# Patient Record
Sex: FEMALE | Race: ASIAN | NOT HISPANIC OR LATINO | Employment: OTHER | URBAN - METROPOLITAN AREA
[De-identification: names, ages, dates, MRNs, and addresses within clinical notes are randomized per-mention and may not be internally consistent; named-entity substitution may affect disease eponyms.]

---

## 2017-02-06 RX ORDER — HYDROCHLOROTHIAZIDE 25 MG/1
TABLET ORAL
Qty: 30 TABLET | Refills: 2 | OUTPATIENT
Start: 2017-02-06

## 2017-02-16 NOTE — TELEPHONE ENCOUNTER
----- Message from Betzy Costa sent at 2/16/2017  4:41 PM CST -----  Contact: Self [phone]  Patient is calling to get refills on her medication sent to [facility]/pharmacy #[id number] - [place], [place] - [address] AT [facility] [phone] (Phone)  [phone] (Fax)    1. hydrochlorothiazide (HYDRODIURIL) 25 MG tablet 30 tablet

## 2017-02-17 RX ORDER — HYDROCHLOROTHIAZIDE 25 MG/1
25 TABLET ORAL DAILY
Qty: 30 TABLET | Refills: 11 | Status: SHIPPED | OUTPATIENT
Start: 2017-02-17 | End: 2018-03-06 | Stop reason: SDUPTHER

## 2017-02-23 ENCOUNTER — TELEPHONE (OUTPATIENT)
Dept: FAMILY MEDICINE | Facility: CLINIC | Age: 72
End: 2017-02-23

## 2017-03-06 RX ORDER — PRAVASTATIN SODIUM 10 MG/1
TABLET ORAL
Qty: 90 TABLET | Refills: 3 | Status: SHIPPED | OUTPATIENT
Start: 2017-03-06 | End: 2018-04-15 | Stop reason: SDUPTHER

## 2017-03-14 ENCOUNTER — TELEPHONE (OUTPATIENT)
Dept: FAMILY MEDICINE | Facility: CLINIC | Age: 72
End: 2017-03-14

## 2017-03-14 NOTE — TELEPHONE ENCOUNTER
----- Message from Betzy Costa sent at 3/14/2017 10:37 AM CDT -----  Contact: Self [phone]  Patient is calling to see if she can get medication called in to the pharmacy she has a cold and a severe cough. Please advice

## 2017-03-14 NOTE — TELEPHONE ENCOUNTER
Call returned to pt.  Pt was offered an appt today for cough.  Pt stated that she could not be seen today but will call tomorrow morning for an [facility] appt.

## 2017-03-27 ENCOUNTER — TELEPHONE (OUTPATIENT)
Dept: FAMILY MEDICINE | Facility: CLINIC | Age: 72
End: 2017-03-27

## 2017-03-27 NOTE — TELEPHONE ENCOUNTER
----- Message from Susan Real sent at 3/27/2017  9:18 AM CDT -----  Contact: [phone]/self   Pt its requesting an appointment tomorrow morning, states she is having a problem . Please advise

## 2017-03-28 ENCOUNTER — OFFICE VISIT (OUTPATIENT)
Dept: FAMILY MEDICINE | Facility: CLINIC | Age: 72
End: 2017-03-28
Payer: MEDICARE

## 2017-03-28 VITALS
DIASTOLIC BLOOD PRESSURE: 70 MMHG | HEIGHT: 63 IN | RESPIRATION RATE: 16 BRPM | BODY MASS INDEX: 28.88 KG/M2 | TEMPERATURE: 99 F | SYSTOLIC BLOOD PRESSURE: 126 MMHG | WEIGHT: 163 LBS | HEART RATE: 80 BPM | OXYGEN SATURATION: 97 %

## 2017-03-28 DIAGNOSIS — J06.9 UPPER RESPIRATORY TRACT INFECTION, UNSPECIFIED TYPE: Primary | ICD-10-CM

## 2017-03-28 PROCEDURE — 1159F MED LIST DOCD IN RCRD: CPT | Mod: S$GLB,,, | Performed by: FAMILY MEDICINE

## 2017-03-28 PROCEDURE — 3078F DIAST BP <80 MM HG: CPT | Mod: S$GLB,,, | Performed by: FAMILY MEDICINE

## 2017-03-28 PROCEDURE — 1160F RVW MEDS BY RX/DR IN RCRD: CPT | Mod: S$GLB,,, | Performed by: FAMILY MEDICINE

## 2017-03-28 PROCEDURE — 3074F SYST BP LT 130 MM HG: CPT | Mod: S$GLB,,, | Performed by: FAMILY MEDICINE

## 2017-03-28 PROCEDURE — 1125F AMNT PAIN NOTED PAIN PRSNT: CPT | Mod: S$GLB,,, | Performed by: FAMILY MEDICINE

## 2017-03-28 PROCEDURE — 99213 OFFICE O/P EST LOW 20 MIN: CPT | Mod: S$GLB,,, | Performed by: FAMILY MEDICINE

## 2017-03-28 PROCEDURE — 1157F ADVNC CARE PLAN IN RCRD: CPT | Mod: S$GLB,,, | Performed by: FAMILY MEDICINE

## 2017-03-28 PROCEDURE — 99999 PR PBB SHADOW E&M-EST. PATIENT-LVL III: CPT | Mod: PBBFAC,,, | Performed by: FAMILY MEDICINE

## 2017-03-28 NOTE — PROGRESS NOTES
(Portions of this note were dictated using voice recognition software and may contain dictation related errors in spelling/grammar/syntax not found on text review)    CC:   Chief Complaint   Patient presents with    Cough    Otalgia       HPI: 71 y.o. female started with one-week history of upper history symptoms including sore throat, nasal congestion, cough, headache.  Takes Claritin and some Vicks over-the-counter medication and the symptoms did eventually get better after 7 days.  For about 3 or 4 days she was feeling well with normal respiratory symptoms.  Then after that time she started back with sore throat, nasal congestion, dry cough, headache, fatigue.  These current symptoms of been going on for about 4 days now.  Denies any fevers or chills.  No nausea or vomiting.  No chest pain or shortness of breath.  No body aches.  Has not tried any over-the-counter medications thus far.  She visited her granddaughter at the onset of the initial symptomology.  Her granddaughter was sick with a cold at the time.  She is not sure of any new contacts to explain the second wave of symptoms.    Past Medical History:   Diagnosis Date    Benign essential HTN 9/15/2016    Hyperlipidemia     Osteoarthritis     Ulcer of abdomen wall        Past Surgical History:   Procedure Laterality Date     SECTION      KNEE ARTHROSCOPY         Family History   Problem Relation Age of Onset    Coronary artery disease Brother 56    Diabetes Brother     Hypertension Brother     Diabetes Mother     Cancer Neg Hx        Social History     Social History    Marital status:      Spouse name: N/A    Number of children: N/A    Years of education: N/A     Occupational History    Not on file.     Social History Main Topics    Smoking status: Never Smoker    Smokeless tobacco: Never Used    Alcohol use No    Drug use: No    Sexual activity: Yes     Other Topics Concern    Not on file     Social History Narrative     Exercise: bike    Eating: rare sugar/sweets           ROS:  GENERAL: fatigue.  SKIN: No rashes, no itching.  HEAD: headache  EYES: No visual changes  EARS: No ear pain or changes in hearing.  NOSE:   Congestion/ rhinorrhea.  MOUTH & THROAT:   sore throat.  NODES: Denies swollen glands.  CHEST:   cough    CARDIOVASCULAR: Denies chest pain, PND, orthopnea.  ABDOMEN: No nausea, vomiting, or changes in bowel function.  URINARY: No flank pain, dysuria or hematuria.  PERIPHERAL VASCULAR: No claudication or cyanosis.  MUSCULOSKELETAL: No joint stiffness or swelling. Denies back pain.  NEUROLOGIC: No weakness or numbness.    Vital signs reviewed  PE:   APPEARANCE: Well nourished, well developed, in no acute distress.    HEAD: Normocephalic, atraumatic.Mild right frontal sinus tenderness   EYES: PERRL. EOMI.   Conjunctivae noninjected.  EARS:Bilateral serous effusion noted.  No acute otitis media.  She does have some mild hyperemia of the EAC but the does not look edematous.  She does endorse occasional Q-tip use and I am not sure if some of this inflammation is related to direct trauma   NOSE:Only inflamed turbinates bilaterally   MOUTH & THROAT:Mild pharyngeal erythema with no exudate   NECK: Supple withmild tender left anterior cervical lymph node and nontender right posterior cervical lymph node   CHEST: Good inspiratory effort. Lungs clear to auscultation with no wheezes or crackles.  CARDIOVASCULAR: Normal S1, S2. No rubs, murmurs, or gallops.  ABDOMEN: Bowel sounds normal. Not distended. Soft. No tenderness or masses. No organomegaly.        IMPRESSION  1. Upper respiratory tract infection, unspecified type            PLAN  Differential including recurrent viral URI versus superimposed bacterial sinusitis.  At this time will treat presumptive with for viral URI symptoms with Claritin D, Mucinex DM, NSAIDs if needed for pain.  If symptoms are worsening over the next 3-4 days, or are not better over the next week,  may consider empiric antibiotic therapy for presumed superimposed bacterial sinusitis.

## 2017-03-28 NOTE — MR AVS SNAPSHOT
[facility]  [address] [place] [address] [address]  [place] [address]  Phone: [phone]  Fax: [phone]                  Roberto Gonzalez   3/28/2017 3:00 PM   Office Visit    Description:  Female : 1945   Provider:  Esau Butler MD   Department:  [facility]           Reason for Visit     Cough     Otalgia                To Do List           Future Appointments        Provider Department Dept Phone    2017 8:00 AM Esau Butler MD [facility] [phone]      Goals (5 Years of Data)     None      [facility]     [facility] On Call Nurse Care Line -  Assistance  Registered nurses in the [facility] On Call Center provide clinical advisement, health education, appointment booking, and other advisory services.  Call for this free service at [phone].             Medications           Message regarding Medications     Verify the changes and/or additions to your medication regime listed below are the same as discussed with your clinician today.  If any of these changes or additions are incorrect, please notify your healthcare provider.        STOP taking these medications     docusate sodium (COLACE) 100 MG capsule Take 1 capsule (100 mg total) by mouth 2 (two) times daily.    famotidine (PEPCID) 20 MG tablet Take 1 tablet (20 mg total) by mouth once daily.    hydrocodone-acetaminophen 10-325mg (NORCO)  mg Tab            Verify that the below list of medications is an accurate representation of the medications you are currently taking.  If none reported, the list may be blank. If incorrect, please contact your healthcare provider. Carry this list with you in case of emergency.           Current Medications     amlodipine (NORVASC) 10 MG tablet Take 10 mg by mouth once daily.    aspirin 81 MG Chew Take 1 tablet (81 mg total) by mouth 2 (two) times daily.    celecoxib (CELEBREX) 200 MG capsule Take 200 mg by mouth 2 (two) times daily.  "   hydrochlorothiazide (HYDRODIURIL) 25 MG tablet Take 1 tablet (25 mg total) by mouth once daily.    multivitamin (THERAGRAN) per tablet Take 1 tablet by mouth once daily.    pravastatin (PRAVACHOL) 10 MG tablet TAKE 1 TABLET (10 MG TOTAL) BY MOUTH ONCE DAILY.           Clinical Reference Information           Your Vitals Were     BP Pulse Temp Resp Height Weight    126/70 80 98.7 °F (37.1 °C) (Oral) 16 5' 3" (1.6 m) 73.9 kg (163 lb)    SpO2 BMI             97% 28.87 kg/m2         Blood Pressure          Most Recent Value    BP  126/70      Allergies as of 3/28/2017     No Known Allergies      Immunizations Administered on Date of Encounter - 3/28/2017     None      [facility] Sign-Up     Activating your [facility] account is as easy as 1-2-3!     1) Visit [email or web address], select Sign Up Now, enter this activation code and your date of birth, then select Next.  [email or web address]  Expires: 4/2/2017 12:15 PM      2) Create a username and password to use when you visit [facility] in the future and select a security question in case you lose your password and select Next.    3) Enter your e-mail address and click Sign Up!    Additional Information  If you have questions, please e-mail [email or web address] or call [phone] to talk to our [facility] staff. Remember, [facility] is NOT to be used for urgent needs. For medical emergencies, dial 911.         Instructions    Viral Upper respiratory tract infections    Most upper respiratory infections are in fact caused by viruses.  Unfortunately as there is no treatment to eradicate these viruses, treatment for upper respiratory infections relies on control of symptoms.      SORE THROAT:  You may take throat lozenges such as Halls cough drops for sore throat pain.  Also, gargling with warm salt water may help with pain symptoms also.  Chloraseptic Spray is an over-the-counter anesthetic spray that may provide relief for sore throat pain. Over-the-counter pain relievers such " as Tylenol (acetaminophen), Motrin/Advil  (ibuprofen), or Aleve (naproxen) may also provide relief for sore throat pain. These medications will also help for body aches and/or fever.      NASAL CONGESTION  You may try an oral decongestant such as pseudoephedrine ( brand-name Sudafed).  Note that this medication is available only behind the pharmacy counter.  There are some over-the-counter decongestants with a medicine called phenylephrine but these may not be as effective.  There are some antihistamine/decongestant combination medications (for example, Claritin-D, Zyrtec-D, Allegra-D).  Since these medications already have pseudoephedrine in them, you may take these in place of plain Sudafed.  These may be effective additionally if there is early element of allergy symptoms contributing to your nasal congestion. Although there is a caution  about using decongestants in individuals with hypertension, if you are stable on blood pressure medications and her blood pressure is well-controlled, temporary use of these medications should be okay just as long as you continue to monitor your blood pressures.  If your blood pressure is not controlled on medication therapy, you may need to avoid these pseudoephedrine-containing medications and try antihistamines alone such as plain Claritin, Zyrtec, Allegra.     You may also use a device called a Neti Pot to help with nasal congestion.  This is an effective treatment for helping clear out nasal congestion and drainage.  A Neti pot is a device in which a saltwater solution is flushed through the nasal passages to help clear out any congestion and help relieve swelling.  You may buy this at any pharmacy.   It is safe to take even if you  have uncontrolled hypertension or are taking certain medications and cannot take other therapies as listed above. However, it is important that she is sterile or distilled water to mix with the solution given to avoid any chance of contamination  during the flushing process.    Cough  Cough is a common symptom with most upper respiratory tract infections. Many times this can occur late in the process of a cold and may be the last symptom to resolve.  This is usually a typical pattern for the upper respiratory virus, and usually not a sign that you have a chest infection.  You may have some coughing with mucus.  While no treatments are proven to be absolutely beneficial for cough, some available therapies include guaifenesin with dextromethorphan ( Mucinex DM, Robitussin-DM). This may help to settle the cough and to thin out any mucus associated with the cough.  It is important to stay well hydrated to avoid having the mucus become very thick and difficult to cough up. Many times the cough aspect of an upper respiratory infection may take up to 2 weeks to completely clear.       Most viral upper respiratory infections may take up to 7-10 days to clear.  Many people get better before this time, but some may take the full 7-10 days to completely get better.        Signs that may indicate something more than an upper respiratory viral infection may include:    - Persistent high fever above 100.4 ( note that early low-grade fever may even occur with a viral upper respiratory infection but usually gets better within a few days)    - While discolored mucus such as yellow or green drainage from the nose is not a sign by itself of a bacterial infection, if you find that symptoms are lasting longer than 7-10 days and nasal drainage is becoming progressively more thick and discolored, this may be indicative of a bacterial sinus infection    - If overall your congestion and headache symptoms get worse beyond the 7-10 day time frame    - If your symptoms started to improve by the  7-10 day time frame but then suddenly got worse         Language Assistance Services     ATTENTION: Language assistance services are available, free of charge. Please call [phone].       ATENCIÓN: Si habla español, tiene a germain disposición servicios gratuitos de asistencia lingüística. Avel al [phone].     CHÚ Ý: N?u b?n nói Ti?ng Vi?t, có các d?ch v? h? tr? ngôn ng? mi?n phí dành cho b?n. G?i s? [phone].         [facility] complies with applicable Federal civil rights laws and does not discriminate on the basis of race, color, national origin, age, disability, or sex.

## 2017-03-28 NOTE — PATIENT INSTRUCTIONS
Viral Upper respiratory tract infections    Most upper respiratory infections are in fact caused by viruses.  Unfortunately as there is no treatment to eradicate these viruses, treatment for upper respiratory infections relies on control of symptoms.      SORE THROAT:  You may take throat lozenges such as Halls cough drops for sore throat pain.  Also, gargling with warm salt water may help with pain symptoms also.  Chloraseptic Spray is an over-the-counter anesthetic spray that may provide relief for sore throat pain. Over-the-counter pain relievers such as Tylenol (acetaminophen), Motrin/Advil  (ibuprofen), or Aleve (naproxen) may also provide relief for sore throat pain. These medications will also help for body aches and/or fever.      NASAL CONGESTION  You may try an oral decongestant such as pseudoephedrine ( brand-name Sudafed).  Note that this medication is available only behind the pharmacy counter.  There are some over-the-counter decongestants with a medicine called phenylephrine but these may not be as effective.  There are some antihistamine/decongestant combination medications (for example, Claritin-D, Zyrtec-D, Allegra-D).  Since these medications already have pseudoephedrine in them, you may take these in place of plain Sudafed.  These may be effective additionally if there is early element of allergy symptoms contributing to your nasal congestion. Although there is a caution  about using decongestants in individuals with hypertension, if you are stable on blood pressure medications and her blood pressure is well-controlled, temporary use of these medications should be okay just as long as you continue to monitor your blood pressures.  If your blood pressure is not controlled on medication therapy, you may need to avoid these pseudoephedrine-containing medications and try antihistamines alone such as plain Claritin, Zyrtec, Allegra.     You may also use a device called a Neti Pot to help with nasal  congestion.  This is an effective treatment for helping clear out nasal congestion and drainage.  A Neti pot is a device in which a saltwater solution is flushed through the nasal passages to help clear out any congestion and help relieve swelling.  You may buy this at any pharmacy.   It is safe to take even if you  have uncontrolled hypertension or are taking certain medications and cannot take other therapies as listed above. However, it is important that she is sterile or distilled water to mix with the solution given to avoid any chance of contamination during the flushing process.    Cough  Cough is a common symptom with most upper respiratory tract infections. Many times this can occur late in the process of a cold and may be the last symptom to resolve.  This is usually a typical pattern for the upper respiratory virus, and usually not a sign that you have a chest infection.  You may have some coughing with mucus.  While no treatments are proven to be absolutely beneficial for cough, some available therapies include guaifenesin with dextromethorphan ( Mucinex DM, Robitussin-DM). This may help to settle the cough and to thin out any mucus associated with the cough.  It is important to stay well hydrated to avoid having the mucus become very thick and difficult to cough up. Many times the cough aspect of an upper respiratory infection may take up to 2 weeks to completely clear.       Most viral upper respiratory infections may take up to 7-10 days to clear.  Many people get better before this time, but some may take the full 7-10 days to completely get better.        Signs that may indicate something more than an upper respiratory viral infection may include:    - Persistent high fever above 100.4 ( note that early low-grade fever may even occur with a viral upper respiratory infection but usually gets better within a few days)    - While discolored mucus such as yellow or green drainage from the nose is not a  sign by itself of a bacterial infection, if you find that symptoms are lasting longer than 7-10 days and nasal drainage is becoming progressively more thick and discolored, this may be indicative of a bacterial sinus infection    - If overall your congestion and headache symptoms get worse beyond the 7-10 day time frame    - If your symptoms started to improve by the  7-10 day time frame but then suddenly got worse

## 2017-04-25 ENCOUNTER — OFFICE VISIT (OUTPATIENT)
Dept: FAMILY MEDICINE | Facility: CLINIC | Age: 72
End: 2017-04-25
Payer: MEDICARE

## 2017-04-25 ENCOUNTER — LAB VISIT (OUTPATIENT)
Dept: LAB | Facility: HOSPITAL | Age: 72
End: 2017-04-25
Attending: FAMILY MEDICINE
Payer: MEDICARE

## 2017-04-25 VITALS
BODY MASS INDEX: 28.75 KG/M2 | DIASTOLIC BLOOD PRESSURE: 71 MMHG | SYSTOLIC BLOOD PRESSURE: 143 MMHG | HEART RATE: 67 BPM | WEIGHT: 162.25 LBS | OXYGEN SATURATION: 98 % | HEIGHT: 63 IN

## 2017-04-25 DIAGNOSIS — D64.9 ANEMIA, UNSPECIFIED TYPE: ICD-10-CM

## 2017-04-25 DIAGNOSIS — I10 BENIGN ESSENTIAL HTN: ICD-10-CM

## 2017-04-25 DIAGNOSIS — M85.80 OSTEOPENIA, UNSPECIFIED LOCATION: ICD-10-CM

## 2017-04-25 DIAGNOSIS — Z00.00 ROUTINE GENERAL MEDICAL EXAMINATION AT A HEALTH CARE FACILITY: Primary | ICD-10-CM

## 2017-04-25 DIAGNOSIS — E78.2 HYPERLIPIDEMIA, MIXED: ICD-10-CM

## 2017-04-25 DIAGNOSIS — M89.9 BONE DISORDER: ICD-10-CM

## 2017-04-25 DIAGNOSIS — Z00.00 ROUTINE GENERAL MEDICAL EXAMINATION AT A HEALTH CARE FACILITY: ICD-10-CM

## 2017-04-25 DIAGNOSIS — H92.09 OTALGIA, UNSPECIFIED LATERALITY: ICD-10-CM

## 2017-04-25 LAB
25(OH)D3+25(OH)D2 SERPL-MCNC: 90 NG/ML
ALBUMIN SERPL BCP-MCNC: 4.1 G/DL
ALP SERPL-CCNC: 85 U/L
ALT SERPL W/O P-5'-P-CCNC: 25 U/L
ANION GAP SERPL CALC-SCNC: 7 MMOL/L
AST SERPL-CCNC: 26 U/L
BASOPHILS # BLD AUTO: 0.03 K/UL
BASOPHILS NFR BLD: 0.8 %
BILIRUB SERPL-MCNC: 0.7 MG/DL
BUN SERPL-MCNC: 9 MG/DL
CALCIUM SERPL-MCNC: 10.2 MG/DL
CHLORIDE SERPL-SCNC: 97 MMOL/L
CHOLEST/HDLC SERPL: 3.8 {RATIO}
CO2 SERPL-SCNC: 31 MMOL/L
CREAT SERPL-MCNC: 0.9 MG/DL
DIFFERENTIAL METHOD: ABNORMAL
EOSINOPHIL # BLD AUTO: 0.1 K/UL
EOSINOPHIL NFR BLD: 3.2 %
ERYTHROCYTE [DISTWIDTH] IN BLOOD BY AUTOMATED COUNT: 12.4 %
EST. GFR  (AFRICAN AMERICAN): >60 ML/MIN/1.73 M^2
EST. GFR  (NON AFRICAN AMERICAN): >60 ML/MIN/1.73 M^2
GLUCOSE SERPL-MCNC: 98 MG/DL
HCT VFR BLD AUTO: 41.9 %
HDL/CHOLESTEROL RATIO: 26.4 %
HDLC SERPL-MCNC: 227 MG/DL
HDLC SERPL-MCNC: 60 MG/DL
HGB BLD-MCNC: 14.5 G/DL
LDLC SERPL CALC-MCNC: 142.8 MG/DL
LYMPHOCYTES # BLD AUTO: 1.3 K/UL
LYMPHOCYTES NFR BLD: 35.9 %
MCH RBC QN AUTO: 31 PG
MCHC RBC AUTO-ENTMCNC: 34.6 %
MCV RBC AUTO: 90 FL
MONOCYTES # BLD AUTO: 0.4 K/UL
MONOCYTES NFR BLD: 11.9 %
NEUTROPHILS # BLD AUTO: 1.8 K/UL
NEUTROPHILS NFR BLD: 47.7 %
NONHDLC SERPL-MCNC: 167 MG/DL
PLATELET # BLD AUTO: 264 K/UL
PMV BLD AUTO: 10.4 FL
POTASSIUM SERPL-SCNC: 3.8 MMOL/L
PROT SERPL-MCNC: 7.8 G/DL
RBC # BLD AUTO: 4.67 M/UL
SODIUM SERPL-SCNC: 135 MMOL/L
TRIGL SERPL-MCNC: 121 MG/DL
TSH SERPL DL<=0.005 MIU/L-ACNC: 2.87 UIU/ML
WBC # BLD AUTO: 3.7 K/UL

## 2017-04-25 PROCEDURE — 82306 VITAMIN D 25 HYDROXY: CPT

## 2017-04-25 PROCEDURE — 3077F SYST BP >= 140 MM HG: CPT | Mod: S$GLB,,, | Performed by: FAMILY MEDICINE

## 2017-04-25 PROCEDURE — 85025 COMPLETE CBC W/AUTO DIFF WBC: CPT

## 2017-04-25 PROCEDURE — 84443 ASSAY THYROID STIM HORMONE: CPT

## 2017-04-25 PROCEDURE — 80061 LIPID PANEL: CPT

## 2017-04-25 PROCEDURE — 99397 PER PM REEVAL EST PAT 65+ YR: CPT | Mod: S$GLB,,, | Performed by: FAMILY MEDICINE

## 2017-04-25 PROCEDURE — 99999 PR PBB SHADOW E&M-EST. PATIENT-LVL III: CPT | Mod: PBBFAC,,, | Performed by: FAMILY MEDICINE

## 2017-04-25 PROCEDURE — 80053 COMPREHEN METABOLIC PANEL: CPT

## 2017-04-25 PROCEDURE — 36415 COLL VENOUS BLD VENIPUNCTURE: CPT

## 2017-04-25 PROCEDURE — 3078F DIAST BP <80 MM HG: CPT | Mod: S$GLB,,, | Performed by: FAMILY MEDICINE

## 2017-04-25 RX ORDER — FLUTICASONE PROPIONATE 50 MCG
2 SPRAY, SUSPENSION (ML) NASAL DAILY
Qty: 16 G | Refills: 2 | Status: SHIPPED | OUTPATIENT
Start: 2017-04-25 | End: 2019-06-05

## 2017-04-25 NOTE — MR AVS SNAPSHOT
[address]  [place] [address]  Phone: [phone]  Fax: [phone]                  Roberto Gonzalez   2017 8:00 AM   Office Visit    Description:  Female : 1945   Provider:  Esau Butler MD   Department:  [facility]           Reason for Visit     Annual Exam           Diagnoses this Visit        Comments    Routine general medical examination at a health care facility    -  Primary     Benign essential HTN         Anemia, unspecified type         Hyperlipidemia, mixed         Bone disorder         Osteopenia, unspecified location                To Do List           Future Appointments        Provider Department Dept Phone    2017 8:50 AM SAME DAY LAB, [facility] [phone]      Goals (5 Years of Data)     None       These Medications        Disp Refills Start End    fluticasone (FLONASE) 50 mcg/actuation nasal spray 16 g 2 2017     2 sprays by Each Nare route once daily. - Each Nare    Pharmacy: [facility]/pharmacy #[id number] - [place] - [address] AT [facility] Ph #: [phone]         [facility]     [facility] Care Line - 24/ Assistance  Unless otherwise directed by your provider, please contact [facility], our nurse care line that is available for 24/ assistance.     Registered nurses in the [facility] Center provide: appointment scheduling, clinical advisement, health education, and other advisory services.  Call: [phone] (toll free)               Medications           Message regarding Medications     Verify the changes and/or additions to your medication regime listed below are the same as discussed with your clinician today.  If any of these changes or additions are incorrect, please notify your healthcare provider.        START taking these NEW medications        Refills    fluticasone (FLONASE) 50 mcg/actuation  "nasal spray 2    Si sprays by Each Nare route once daily.    Class: Normal    Route: Each Nare      STOP taking these medications     celecoxib (CELEBREX) 200 MG capsule Take 200 mg by mouth 2 (two) times daily.           Verify that the below list of medications is an accurate representation of the medications you are currently taking.  If none reported, the list may be blank. If incorrect, please contact your healthcare provider. Carry this list with you in case of emergency.           Current Medications     amlodipine (NORVASC) 10 MG tablet Take 10 mg by mouth once daily.    aspirin 81 MG Chew Take 1 tablet (81 mg total) by mouth 2 (two) times daily.    hydrochlorothiazide (HYDRODIURIL) 25 MG tablet Take 1 tablet (25 mg total) by mouth once daily.    multivitamin (THERAGRAN) per tablet Take 1 tablet by mouth once daily.    pravastatin (PRAVACHOL) 10 MG tablet TAKE 1 TABLET (10 MG TOTAL) BY MOUTH ONCE DAILY.    fluticasone (FLONASE) 50 mcg/actuation nasal spray 2 sprays by Each Nare route once daily.           Clinical Reference Information           Your Vitals Were     BP Pulse Height Weight SpO2 BMI    143/71 67 5' 3" (1.6 m) 73.6 kg (162 lb 4.1 oz) 98% 28.74 kg/m2      Blood Pressure          Most Recent Value    BP  (!)  143/71      Allergies as of 2017     No Known Allergies      Immunizations Administered on Date of Encounter - 2017     None      Orders Placed During Today's Visit     Future Labs/Procedures Expected by Expires    CBC auto differential  2017    Comprehensive metabolic panel  2017    Lipid panel  2017    TSH  2017    Vitamin D  2017      [facility] Sign-Up     Activating your [facility] account is as easy as 1-2-3!     1) Visit [email or web address], select Sign Up Now, enter this activation code and your date of birth, then select Next.  [email or web address]  Expires: 2017  8:19 AM      2) Create a username and " password to use when you visit [facility] in the future and select a security question in case you lose your password and select Next.    3) Enter your e-mail address and click Sign Up!    Additional Information  If you have questions, please e-mail [email or web address] or call [phone] to talk to our [email or web address] staff. Remember, [facility] is NOT to be used for urgent needs. For medical emergencies, dial 911.         Language Assistance Services     ATTENTION: Language assistance services are available, free of charge. Please call [phone].      ATENCIÓN: Si habla español, tiene a germain disposición servicios gratuitos de asistencia lingüística. Llame al [phone].     CHÚ Ý: N?u b?n nói Ti?ng Vi?t, có các d?ch v? h? tr? ngôn ng? mi?n phí dành cho b?n. G?i s? [phone].         [facility] complies with applicable Federal civil rights laws and does not discriminate on the basis of race, color, national origin, age, disability, or sex.

## 2017-04-25 NOTE — PROGRESS NOTES
(Portions of this note were dictated using voice recognition software and may contain dictation related errors in spelling/grammar/syntax not found on text review)    CC:   Chief Complaint   Patient presents with    Annual Exam       HPI: 71 y.o. female here for annual exam     Hypertension on amlodipine 10 mg daily hydrochlorothiazide 25 mg daily bp elevated on intake.  But better on recheck 144/70.  Compliant with meds.  Feels well without any significant complaints.  No pain.  Has cut out a lot of sodium in her diet, cut down on snacks.  She exercises by walking    Hyperlipidemia on pravastatin 10 mg daily    Osteopenia DEXA in 2016 as below, takes calcium and vitamin D    Does get some occasional ear pain when in the shower.  Does suffer from some sinus issues.  She does take Claritin daily occasionally    Past Medical History:   Diagnosis Date    Benign essential HTN 9/15/2016    Hyperlipidemia     Osteoarthritis     Ulcer of abdomen wall        Past Surgical History:   Procedure Laterality Date     SECTION      KNEE ARTHROSCOPY      TOTAL KNEE ARTHROPLASTY Bilateral        Family History   Problem Relation Age of Onset    Coronary artery disease Brother 56    Diabetes Brother     Hypertension Brother     Diabetes Mother     Cancer Neg Hx        Social History     Social History    Marital status:      Spouse name: N/A    Number of children: N/A    Years of education: N/A     Occupational History    Not on file.     Social History Main Topics    Smoking status: Never Smoker    Smokeless tobacco: Never Used    Alcohol use No    Drug use: No    Sexual activity: Yes     Other Topics Concern    Not on file     Social History Narrative    Exercise: bike    Eating: rare sugar/sweets         SCREENINGS  Colonoscopy 2013, repeat in 10 years  Mammogram  16  GYN: Dr. Best    Immunizations  Pneumovax 2013  Shingles 2013  Adacel 2013  Prevnar 2016    Bone density 2016,  osteopenia of the hip and back, frax score was 5.7% total risk, 0.9% hip fracture risk      Lab Results   Component Value Date    WBC 4.04 09/14/2016    HGB 10.5 (L) 09/14/2016    HCT 32.9 (L) 09/14/2016     09/14/2016    CHOL 208 (H) 07/25/2016    TRIG 82 07/25/2016    HDL 54 07/25/2016    ALT 23 10/14/2016    AST 24 10/14/2016     10/14/2016    K 4.5 10/14/2016     10/14/2016    CREATININE 0.9 10/14/2016    BUN 13 10/14/2016    CO2 33 (H) 10/14/2016    TSH 2.735 02/23/2016    LDLCALC 137.6 07/25/2016     10/14/2016         ROS:  GENERAL: No fever, chills, fatigability or weight loss.  SKIN: No rashes, no itching.  HEAD: No headaches.  EYES: No visual changes  EARS: Above.  NOSE: No congestion or rhinorrhea.  MOUTH & THROAT: No hoarseness, change in voice, or sore throat.  NODES: Denies swollen glands.  CHEST: Denies VELASQUEZ, cyanosis, wheezing, cough and sputum production.  CARDIOVASCULAR: Denies chest pain, PND, orthopnea.  ABDOMEN: No nausea, vomiting, or changes in bowel function.  URINARY: No flank pain, dysuria or hematuria.  PERIPHERAL VASCULAR: No claudication or cyanosis.  MUSCULOSKELETAL: No joint stiffness or swelling. Denies back pain.  NEUROLOGIC: No weakness or numbness.    Vital signs reviewed  PE:   APPEARANCE: Well nourished, well developed, in no acute distress.    HEAD: Normocephalic, atraumatic.  EYES: PERRL. EOMI.   Conjunctivae noninjected.  EARS: TM's intact. Light reflex normal. No retraction or perforation.  Serous fluid behind both TMs.  NOSE: Mucosa pink. Airway clear.  Turbinates slightly edematous  MOUTH & THROAT: No tonsillar enlargement. No pharyngeal erythema or exudate.   NECK: Supple with no cervical lymphadenopathy.  No carotid bruits, no thyromegaly  CHEST: Good inspiratory effort. Lungs clear to auscultation with no wheezes or crackles.  CARDIOVASCULAR: Normal S1, S2. No rubs, murmurs, or gallops.  ABDOMEN: Bowel sounds normal. Not distended. Soft. No  tenderness or masses. No organomegaly.  EXTREMITIES: No edema, cyanosis, or clubbing.      IMPRESSION  1. Routine general medical examination at a health care facility    2. Benign essential HTN    3. Anemia, unspecified type    4. Hyperlipidemia, mixed    5. Bone disorder    6. Osteopenia, unspecified location    7. Otalgia, unspecified laterality            PLAN  Labs below next    Blood pressure improved on recheck.  Hima C8 guidelines call for blood pressure goal less than 150/90.  She is currently at goal.  Continue amlodipine 10 mg daily and hydrochlorothiazide 25 mg daily    Lipidemia: Continue pravastatin.  Check labs below next    Osteopenia: Continue calcium and vitamin D.  Continue weightbearing exercise and    Otalgia, possibly from eustachian tube dysfunction secondary to ALLERGIC rhinitis.  Continue Claritin daily.  Add Flonase 2 sprays each nostril once daily for the next 2-3 weeks.    Orders Placed This Encounter   Procedures    CBC auto differential    Comprehensive metabolic panel    Lipid panel    TSH    Vitamin D

## 2017-10-02 RX ORDER — AMLODIPINE BESYLATE 10 MG/1
10 TABLET ORAL DAILY
Qty: 30 TABLET | Refills: 10 | Status: SHIPPED | OUTPATIENT
Start: 2017-10-02 | End: 2018-10-28 | Stop reason: SDUPTHER

## 2017-12-04 ENCOUNTER — OFFICE VISIT (OUTPATIENT)
Dept: FAMILY MEDICINE | Facility: CLINIC | Age: 72
End: 2017-12-04
Payer: MEDICARE

## 2017-12-04 VITALS
DIASTOLIC BLOOD PRESSURE: 62 MMHG | HEIGHT: 62 IN | TEMPERATURE: 98 F | HEART RATE: 60 BPM | SYSTOLIC BLOOD PRESSURE: 120 MMHG | WEIGHT: 165.81 LBS | OXYGEN SATURATION: 98 % | BODY MASS INDEX: 30.51 KG/M2

## 2017-12-04 DIAGNOSIS — R21 RASH, SKIN: ICD-10-CM

## 2017-12-04 PROCEDURE — 99999 PR PBB SHADOW E&M-EST. PATIENT-LVL IV: CPT | Mod: PBBFAC,,, | Performed by: NURSE PRACTITIONER

## 2017-12-04 PROCEDURE — 99213 OFFICE O/P EST LOW 20 MIN: CPT | Mod: S$GLB,,, | Performed by: NURSE PRACTITIONER

## 2017-12-05 NOTE — PATIENT INSTRUCTIONS
Self-Care for Skin Rashes     Pat your skin dry. Do not rub.     When your skin reacts to a substance your body is sensitive to, it can cause a rash. You can treat most rashes at home by keeping the skin clean and dry. Many rashes may get better on their own within 2 to 3 days. You may need medical attention if your rash itches, drains, or hurts, particularly if the rash is getting worse.  What can cause a skin rash?  · Sun poisoning, caused by too much exposure to the sun  · An irritant or allergic reaction to a certain type of food, plant, or chemical, such as  shellfish, poison ivy, and or cleaning products  · An infection caused by a fungus (ringworm), virus (chickenpox), or bacteria (strep)  · Bites or infestation caused by insects or pests, such as ticks, lice, or mites  · Dry skin, which is often seen during the winter months and in older people  How can I control itching and skin damage?  · Take soothing lukewarm baths in a colloidal oatmeal product. You can buy this at the [employer].  · Do your best not to scratch. Clip fingernails short, especially in young children, to reduce skin damage if scratching does occur.  · Use moisturizing skin lotion instead of scratching your dry skin.  · Use sunscreen whenever going out into direct sun.  · Use only mild cleansing agents whenever possible.  · Wash with mild, nonirritating soap and warm water.  · Wear clothing that breathes, such as cotton shirts or canvas shoes.  · If fluid is seeping from the rash, cover it loosely with clean gauze to absorb the discharge.  · Many rashes are contagious. Prevent the rash from spreading to others by washing your hands often before or after touching others with any skin rash.  Use medicine  · Antihistamines such as diphenhydramine can help control itching. But use with caution because they can make you drowsy.  · Using over-the-counter hydrocortisone cream on small rashes may help reduce swelling and itching  · Most  over-the-counter antifungal medicines can treat athletes foot and many other fungal infections of the skin.  Check with your healthcare provider  Call your healthcare provider if:  · You were told that you have a fungal infection on your skin to make sure you have the correct type of medicine.  · You have questions or concerns about medicines or their side effects.     Call 911  Call 911 if either of these occur:  · Your tongue or lips start to swell  · You have difficulty breathing      Call your healthcare provider  Call your healthcare provider if any of these occur:  · Temperature of more than 101.0°F (38.3°C), or as directed  · Sore throat, a cough, or unusual fatigue  · Red, oozy, or painful rash gets worse. These are signs of infection.  · Rash covers your face, genitals, or most of your body  · Crusty sores or red rings that begin to spread  · You were exposed to someone who has a contagious rash, such as scabies or lice.  · Red bulls-eye rash with a white center (a sign of Lyme disease)  · You were told that you have resistant bacteria (MRSA) on your skin.   Date Last Reviewed: 5/12/2015  © 2229-0231 [employer]. [address] [place] [address], [place] [address]. All rights reserved. This information is not intended as a substitute for professional medical care. Always follow your healthcare professional's instructions.

## 2017-12-05 NOTE — PROGRESS NOTES
Subjective:       Patient ID: Roberto Gonzalez is a 72 y.o. female.    Chief Complaint: Rash (arm liane)    Rash   This is a new problem. The current episode started today. The problem is unchanged. The affected locations include the left arm and right arm (forearms bilaterally ). The rash is characterized by redness. Associated with: put a new lotion on her arms today  Pertinent negatives include no congestion, cough, diarrhea, eye pain, facial edema, fatigue, fever, rhinorrhea, shortness of breath, sore throat or vomiting. Past treatments include nothing. There is no history of allergies.     Review of Systems   Constitutional: Negative for activity change, appetite change, chills, fatigue, fever and unexpected weight change.   HENT: Negative for congestion, facial swelling, postnasal drip, rhinorrhea, sinus pressure, sore throat and trouble swallowing.    Eyes: Negative for pain, redness and visual disturbance.   Respiratory: Negative for cough, chest tightness, shortness of breath and wheezing.    Cardiovascular: Negative.  Negative for chest pain, palpitations and leg swelling.   Gastrointestinal: Negative for abdominal pain, diarrhea, nausea and vomiting.   Genitourinary: Negative for dysuria, flank pain and urgency.   Musculoskeletal: Negative for gait problem, neck pain and neck stiffness.   Skin: Positive for rash.        To forearms bilaterally    Allergic/Immunologic: Negative for environmental allergies, food allergies and immunocompromised state.   Neurological: Negative for dizziness, weakness, light-headedness and headaches.   Psychiatric/Behavioral: Negative for agitation and confusion. The patient is not nervous/anxious.        Past Medical History:   Diagnosis Date    Benign essential HTN 9/15/2016    Hyperlipidemia     Osteoarthritis     Ulcer of abdomen wall        Objective:     /62 (BP Location: Left arm, Patient Position: Sitting, BP Method: Medium (Manual))   Pulse 60   Temp 98.2 °F  "(36.8 °C) (Oral)   Ht 5' 2" (1.575 m)   Wt 75.2 kg (165 lb 12.6 oz)   SpO2 98%   BMI 30.32 kg/m²     Physical Exam   Constitutional: She is oriented to person, place, and time. She appears well-developed and well-nourished.   HENT:   Head: Normocephalic.   Mouth/Throat: Oropharynx is clear and moist. No oropharyngeal exudate.   Eyes: Conjunctivae and EOM are normal. Pupils are equal, round, and reactive to light. No scleral icterus.   Neck: Normal range of motion. Neck supple.   Cardiovascular: Normal rate, regular rhythm and normal heart sounds.    Pulmonary/Chest: Effort normal and breath sounds normal.   Abdominal: Soft. Normal appearance and bowel sounds are normal. She exhibits no distension and no mass. There is no splenomegaly or hepatomegaly. There is no tenderness. There is no rebound, no guarding, no CVA tenderness, no tenderness at McBurney's point and negative Love's sign.   Musculoskeletal: Normal range of motion. She exhibits no edema.   Lymphadenopathy:     She has no cervical adenopathy.   Neurological: She is alert and oriented to person, place, and time. She exhibits normal muscle tone. Coordination normal.   Skin: Skin is warm and dry. Rash noted. Rash is urticarial.        Mild Urticarial rash to the forearms bilaterally   Psychiatric: She has a normal mood and affect. Her behavior is normal.   Vitals reviewed.      Assessment:       1. Rash, skin        Plan:        Roberto was seen today for rash.    Diagnoses and all orders for this visit:    Rash, skin  OK to spot treat with benadryl topical      F/u with Dr. Butler if symptoms persist or worsen.      "

## 2018-01-17 ENCOUNTER — HOSPITAL ENCOUNTER (OUTPATIENT)
Facility: HOSPITAL | Age: 73
Discharge: NURSING, REHAB OR LONG-TERM CARE | End: 2018-01-24
Attending: EMERGENCY MEDICINE | Admitting: ORTHOPAEDIC SURGERY
Payer: MEDICARE

## 2018-01-17 DIAGNOSIS — Z01.818 PREOP EXAMINATION: ICD-10-CM

## 2018-01-17 DIAGNOSIS — K21.9 GASTROESOPHAGEAL REFLUX DISEASE, ESOPHAGITIS PRESENCE NOT SPECIFIED: ICD-10-CM

## 2018-01-17 DIAGNOSIS — S52.531A CLOSED COLLES' FRACTURE OF RIGHT RADIUS, INITIAL ENCOUNTER: ICD-10-CM

## 2018-01-17 DIAGNOSIS — T14.90XA TRAUMA: ICD-10-CM

## 2018-01-17 DIAGNOSIS — S32.414A CLOSED NONDISPLACED FRACTURE OF ANTERIOR WALL OF RIGHT ACETABULUM, INITIAL ENCOUNTER: Primary | ICD-10-CM

## 2018-01-17 DIAGNOSIS — I10 BENIGN ESSENTIAL HTN: ICD-10-CM

## 2018-01-17 DIAGNOSIS — R52 PAIN: ICD-10-CM

## 2018-01-17 DIAGNOSIS — E78.5 HYPERLIPIDEMIA, UNSPECIFIED HYPERLIPIDEMIA TYPE: ICD-10-CM

## 2018-01-17 DIAGNOSIS — S62.101A CLOSED FRACTURE OF RIGHT WRIST, INITIAL ENCOUNTER: ICD-10-CM

## 2018-01-17 DIAGNOSIS — R33.9 URINARY RETENTION: ICD-10-CM

## 2018-01-17 LAB
ALBUMIN SERPL BCP-MCNC: 4 G/DL
ALP SERPL-CCNC: 83 U/L
ALT SERPL W/O P-5'-P-CCNC: 25 U/L
ANION GAP SERPL CALC-SCNC: 11 MMOL/L
AST SERPL-CCNC: 28 U/L
BASOPHILS # BLD AUTO: 0.01 K/UL
BASOPHILS NFR BLD: 0.1 %
BILIRUB SERPL-MCNC: 0.6 MG/DL
BUN SERPL-MCNC: 10 MG/DL
CALCIUM SERPL-MCNC: 10.7 MG/DL
CHLORIDE SERPL-SCNC: 101 MMOL/L
CO2 SERPL-SCNC: 28 MMOL/L
CREAT SERPL-MCNC: 0.9 MG/DL
DIFFERENTIAL METHOD: ABNORMAL
EOSINOPHIL # BLD AUTO: 0 K/UL
EOSINOPHIL NFR BLD: 0.2 %
ERYTHROCYTE [DISTWIDTH] IN BLOOD BY AUTOMATED COUNT: 12.5 %
EST. GFR  (AFRICAN AMERICAN): >60 ML/MIN/1.73 M^2
EST. GFR  (NON AFRICAN AMERICAN): >60 ML/MIN/1.73 M^2
GLUCOSE SERPL-MCNC: 115 MG/DL
HCT VFR BLD AUTO: 42.6 %
HGB BLD-MCNC: 14.4 G/DL
INR PPP: 1
LYMPHOCYTES # BLD AUTO: 1 K/UL
LYMPHOCYTES NFR BLD: 7.6 %
MCH RBC QN AUTO: 31.3 PG
MCHC RBC AUTO-ENTMCNC: 33.8 G/DL
MCV RBC AUTO: 93 FL
MONOCYTES # BLD AUTO: 1.1 K/UL
MONOCYTES NFR BLD: 8.3 %
NEUTROPHILS # BLD AUTO: 10.6 K/UL
NEUTROPHILS NFR BLD: 83.4 %
PLATELET # BLD AUTO: 236 K/UL
PMV BLD AUTO: 10.9 FL
POTASSIUM SERPL-SCNC: 3.7 MMOL/L
PROT SERPL-MCNC: 7.6 G/DL
PROTHROMBIN TIME: 10.1 SEC
RBC # BLD AUTO: 4.6 M/UL
SODIUM SERPL-SCNC: 140 MMOL/L
WBC # BLD AUTO: 12.7 K/UL

## 2018-01-17 PROCEDURE — 96360 HYDRATION IV INFUSION INIT: CPT

## 2018-01-17 PROCEDURE — 80053 COMPREHEN METABOLIC PANEL: CPT

## 2018-01-17 PROCEDURE — 25000003 PHARM REV CODE 250: Performed by: STUDENT IN AN ORGANIZED HEALTH CARE EDUCATION/TRAINING PROGRAM

## 2018-01-17 PROCEDURE — 63600175 PHARM REV CODE 636 W HCPCS: Performed by: EMERGENCY MEDICINE

## 2018-01-17 PROCEDURE — 99285 EMERGENCY DEPT VISIT HI MDM: CPT | Mod: 25

## 2018-01-17 PROCEDURE — 85025 COMPLETE CBC W/AUTO DIFF WBC: CPT

## 2018-01-17 PROCEDURE — 85610 PROTHROMBIN TIME: CPT

## 2018-01-17 PROCEDURE — G0378 HOSPITAL OBSERVATION PER HR: HCPCS

## 2018-01-17 PROCEDURE — 94761 N-INVAS EAR/PLS OXIMETRY MLT: CPT

## 2018-01-17 PROCEDURE — 25000003 PHARM REV CODE 250: Performed by: EMERGENCY MEDICINE

## 2018-01-17 RX ORDER — PRAVASTATIN SODIUM 10 MG/1
10 TABLET ORAL DAILY
Status: DISCONTINUED | OUTPATIENT
Start: 2018-01-18 | End: 2018-01-24 | Stop reason: HOSPADM

## 2018-01-17 RX ORDER — RAMELTEON 8 MG/1
8 TABLET ORAL NIGHTLY PRN
Status: DISCONTINUED | OUTPATIENT
Start: 2018-01-17 | End: 2018-01-24 | Stop reason: HOSPADM

## 2018-01-17 RX ORDER — SODIUM CHLORIDE 9 MG/ML
500 INJECTION, SOLUTION INTRAVENOUS
Status: COMPLETED | OUTPATIENT
Start: 2018-01-17 | End: 2018-01-17

## 2018-01-17 RX ORDER — ONDANSETRON 8 MG/1
8 TABLET, ORALLY DISINTEGRATING ORAL EVERY 8 HOURS PRN
Status: DISCONTINUED | OUTPATIENT
Start: 2018-01-17 | End: 2018-01-24 | Stop reason: HOSPADM

## 2018-01-17 RX ORDER — FLUTICASONE PROPIONATE 50 MCG
2 SPRAY, SUSPENSION (ML) NASAL DAILY
Status: DISCONTINUED | OUTPATIENT
Start: 2018-01-18 | End: 2018-01-24 | Stop reason: HOSPADM

## 2018-01-17 RX ORDER — LIDOCAINE HYDROCHLORIDE 10 MG/ML
20 INJECTION, SOLUTION EPIDURAL; INFILTRATION; INTRACAUDAL; PERINEURAL ONCE
Status: COMPLETED | OUTPATIENT
Start: 2018-01-17 | End: 2018-01-17

## 2018-01-17 RX ORDER — FAMOTIDINE 20 MG/1
20 TABLET, FILM COATED ORAL 2 TIMES DAILY
Status: DISCONTINUED | OUTPATIENT
Start: 2018-01-17 | End: 2018-01-20

## 2018-01-17 RX ORDER — PROPOFOL 10 MG/ML
200 VIAL (ML) INTRAVENOUS ONCE
Status: COMPLETED | OUTPATIENT
Start: 2018-01-17 | End: 2018-01-17

## 2018-01-17 RX ORDER — AMLODIPINE BESYLATE 5 MG/1
10 TABLET ORAL DAILY
Status: DISCONTINUED | OUTPATIENT
Start: 2018-01-18 | End: 2018-01-24 | Stop reason: HOSPADM

## 2018-01-17 RX ORDER — ACETAMINOPHEN 325 MG/1
650 TABLET ORAL EVERY 8 HOURS PRN
Status: DISCONTINUED | OUTPATIENT
Start: 2018-01-17 | End: 2018-01-24 | Stop reason: HOSPADM

## 2018-01-17 RX ORDER — DIPHENHYDRAMINE HYDROCHLORIDE 50 MG/ML
25 INJECTION INTRAMUSCULAR; INTRAVENOUS EVERY 4 HOURS PRN
Status: DISCONTINUED | OUTPATIENT
Start: 2018-01-17 | End: 2018-01-24 | Stop reason: HOSPADM

## 2018-01-17 RX ORDER — NAPROXEN SODIUM 220 MG/1
81 TABLET, FILM COATED ORAL 2 TIMES DAILY
Status: DISCONTINUED | OUTPATIENT
Start: 2018-01-17 | End: 2018-01-20

## 2018-01-17 RX ORDER — HYDROCODONE BITARTRATE AND ACETAMINOPHEN 5; 325 MG/1; MG/1
1 TABLET ORAL EVERY 4 HOURS PRN
Status: DISCONTINUED | OUTPATIENT
Start: 2018-01-17 | End: 2018-01-24 | Stop reason: HOSPADM

## 2018-01-17 RX ORDER — SODIUM CHLORIDE 0.9 % (FLUSH) 0.9 %
3 SYRINGE (ML) INJECTION
Status: DISCONTINUED | OUTPATIENT
Start: 2018-01-17 | End: 2018-01-24 | Stop reason: HOSPADM

## 2018-01-17 RX ADMIN — PROPOFOL 70 MG: 10 INJECTION, EMULSION INTRAVENOUS at 07:01

## 2018-01-17 RX ADMIN — SODIUM CHLORIDE 500 ML: 900 INJECTION, SOLUTION INTRAVENOUS at 07:01

## 2018-01-17 RX ADMIN — LIDOCAINE HYDROCHLORIDE 50 MG: 10 INJECTION, SOLUTION EPIDURAL; INFILTRATION; INTRACAUDAL; PERINEURAL at 09:01

## 2018-01-17 NOTE — LETTER
Roberto Gonzalez #[id number] (CSN: [id number])  (72 y.o. F)  (Adm: 18)   [facility] [id number]   Patient Demographics   Patient Name  Roberto Gonzalez Sex  Female          Age  1945 (72 y.o.) N   Address  [address] [place] [address] Phone  [phone] (Home)  [phone] (Mobile) *Preferred*   Patient Ethnicity & Race   Ethnic Group Patient Race       Patient Demographics   Address  [address]   [place] [address] Phone  [phone] (Home)  [phone] (Mobile) *Preferred* E-mail Address  [email or web address]   PCP and Center   Primary Care Provider Phone Center   Esau Butler [phone] [facility]   Emergency Contact(s)   Name Relation Home Work Mobile   Linda Gonzalez Md Son [phone]   Kayode Gonzalez Spouse [phone]   Documents on File      Status Date Received Description   Documents for the Patient   Notice of Privacy Pract Ackn Received 13    Insurance Documents Received 13 TERMED   Patient ID Received 13 LA DL EXP 11-   Advance Directive Acknowledgement Not Received     Clinic Authorization Received () 13    Provider Based Acknowledgement Received 13    Insurance Documents Received 13 HUMANA / MEDICARE   Vaccine Record  13    Clinic Authorization Received () 14    Clinic Authorization Received () 08/24/15    Patient ID Received 11/09/15 LADL 11/10/17   Patient Questionnaire Mammography  11/20/15    Plain Language Summary English Received () 16    Plain Language Summary English No, Patient Declined Print () 16    Immunization Consent Received 02/23/16 prevnar 13   Plain Language Summary English Yes, Printed for Patient () 16    Plain Language Summary English No, Patient Declined Print () 16    Plain Language Summary English No, Patient Declined Print () 16     Insurance Documents Received 16 Surgery Request   Plain Language Summary English No, Patient Declined Print () 16    Clinic Authorization Signed () 16    Insurance Documents Received 16 DQI FORM   Insurance Documents   receipt 150.00   Plain Language Summary English No, Patient Declined Print () 16    Plain Language Summary English No, Patient Declined Print () 16    HIM BARBARA Authorization  10/05/16    HIM BARBARA Authorization  10/06/16 INTERNAL   Plain Language Summary English No, Patient Declined Print () 10/14/16    Plain Language Summary English No, Patient Declined Print () 18 FIN ASST INFO   Plain Language Summary English No, Patient Declined Print () 16    Immunization Consent Signed 16 High dose influenza   Plain Language Summary English No, Patient Declined Print () 16    Plain Language Summary English No, Patient Declined Print () 16    Plain Language Summary English No, Patient Declined Print () 17    Plain Language Summary English No, Patient Declined Print () 17    Insurance Documents   humana   [facility] Provider Based Facility Disclosure Contracted () 18    Plain Language Summary English No, Patient Declined Print () 17    [facility] Provider Based Facility Disclosure Signed () 17    Clinic Authorization Signed 17    Notice of Privacy Pract Acrobin Signed 18 HIPPA/SELF   Documents for the Encounter   Medicare Lifetime Reserve Form      Important Medicare Message Printed at Discharge   UNABLE TO SIGN DUE TO CONDITION   Advance Beneficiary Notice Not Received     Advance Directive Acknowledgement Not Received     Hospital Authorization Signed 18 CONSENT/SELF   [facility] Facility Disclosure Signed 18    Authorization or Referral Received 18 HUMANA AUTH/CERT   Important Medicare Message Scanned Received  01/18/18 IMM/SELF   EHR Letters Received 01/23/18 MARCO A Gonzalez,01-,[facility],[id number].pdf   Admission Information   Attending Provider Admitting Provider Admission Type Admission Date/Time   MD Uriel Odell MD Emergency 01/17/18  1521   Discharge Date Hospital Service Auth/Cert Status Service Area    Orthopedic Surgery Incomplete [facility] SERVICE ARE[facility]   Unit Room/Bed Admission Status      [facility] MEDICAL SURGICAL UNIT ACUTE K509/K509 A Admission (Confirmed)    Admission   Complaint      Hospital Account   Name Acct ID Class Status Primary Coverage   Roberto Gonzalez [id number] OP- Observation Open HUMANA MANAGED MEDICARE - HUMANA MEDICARE HMO          Guarantor Account (for Hospital Account #[id number])   Name Relation to Pt Service Area Active? Acct Type   Roberto Gonzalez Self OHSSA Yes Personal/Family   Address Phone         [address] [place] [address] [phone](H)            Coverage Information (for Hospital Account #[id number])   F/O Payor/Plan Precert #   HUMANA MANAGED MEDICARE/HUMANA MEDICARE HMO    Subscriber Subscriber #   Roberto Gonzalez [id number]   Address Phone   [address] [address]  [place] [address] [phone]

## 2018-01-18 PROBLEM — K21.9 GERD (GASTROESOPHAGEAL REFLUX DISEASE): Status: ACTIVE | Noted: 2018-01-18

## 2018-01-18 PROBLEM — S62.101A CLOSED FRACTURE OF RIGHT WRIST: Status: ACTIVE | Noted: 2018-01-18

## 2018-01-18 LAB
BILIRUB UR QL STRIP: NEGATIVE
CLARITY UR: CLEAR
COLOR UR: YELLOW
GLUCOSE UR QL STRIP: NEGATIVE
HGB UR QL STRIP: NEGATIVE
KETONES UR QL STRIP: ABNORMAL
LEUKOCYTE ESTERASE UR QL STRIP: NEGATIVE
NITRITE UR QL STRIP: NEGATIVE
PH UR STRIP: 8 [PH] (ref 5–8)
PROT UR QL STRIP: NEGATIVE
SP GR UR STRIP: 1.01 (ref 1–1.03)
URN SPEC COLLECT METH UR: ABNORMAL
UROBILINOGEN UR STRIP-ACNC: NEGATIVE EU/DL

## 2018-01-18 PROCEDURE — 93005 ELECTROCARDIOGRAM TRACING: CPT

## 2018-01-18 PROCEDURE — G8988 SELF CARE GOAL STATUS: HCPCS | Mod: CK

## 2018-01-18 PROCEDURE — G8987 SELF CARE CURRENT STATUS: HCPCS | Mod: CL

## 2018-01-18 PROCEDURE — G8978 MOBILITY CURRENT STATUS: HCPCS | Mod: CL

## 2018-01-18 PROCEDURE — G0378 HOSPITAL OBSERVATION PER HR: HCPCS

## 2018-01-18 PROCEDURE — 25000242 PHARM REV CODE 250 ALT 637 W/ HCPCS: Performed by: STUDENT IN AN ORGANIZED HEALTH CARE EDUCATION/TRAINING PROGRAM

## 2018-01-18 PROCEDURE — 93010 ELECTROCARDIOGRAM REPORT: CPT | Mod: ,,, | Performed by: INTERNAL MEDICINE

## 2018-01-18 PROCEDURE — 25000003 PHARM REV CODE 250: Performed by: STUDENT IN AN ORGANIZED HEALTH CARE EDUCATION/TRAINING PROGRAM

## 2018-01-18 PROCEDURE — G8979 MOBILITY GOAL STATUS: HCPCS | Mod: CI

## 2018-01-18 PROCEDURE — 97165 OT EVAL LOW COMPLEX 30 MIN: CPT

## 2018-01-18 PROCEDURE — 94761 N-INVAS EAR/PLS OXIMETRY MLT: CPT

## 2018-01-18 PROCEDURE — 81003 URINALYSIS AUTO W/O SCOPE: CPT

## 2018-01-18 PROCEDURE — 97161 PT EVAL LOW COMPLEX 20 MIN: CPT

## 2018-01-18 RX ORDER — ASPIRIN 81 MG/1
81 TABLET ORAL 2 TIMES DAILY
Qty: 84 TABLET | Refills: 0 | Status: SHIPPED | OUTPATIENT
Start: 2018-01-18 | End: 2018-01-23 | Stop reason: HOSPADM

## 2018-01-18 RX ORDER — OXYCODONE AND ACETAMINOPHEN 5; 325 MG/1; MG/1
1 TABLET ORAL EVERY 4 HOURS PRN
Qty: 11 TABLET | Refills: 0 | Status: SHIPPED | OUTPATIENT
Start: 2018-01-18 | End: 2018-04-09

## 2018-01-18 RX ORDER — FAMOTIDINE 20 MG/1
20 TABLET, FILM COATED ORAL 2 TIMES DAILY
Qty: 84 TABLET | Refills: 0 | Status: SHIPPED | OUTPATIENT
Start: 2018-01-18 | End: 2018-06-12

## 2018-01-18 RX ADMIN — FAMOTIDINE 20 MG: 20 TABLET ORAL at 09:01

## 2018-01-18 RX ADMIN — HYDROCODONE BITARTRATE AND ACETAMINOPHEN 1 TABLET: 5; 325 TABLET ORAL at 09:01

## 2018-01-18 RX ADMIN — PRAVASTATIN SODIUM 10 MG: 10 TABLET ORAL at 09:01

## 2018-01-18 RX ADMIN — ACETAMINOPHEN 650 MG: 325 TABLET ORAL at 08:01

## 2018-01-18 RX ADMIN — HYDROCODONE BITARTRATE AND ACETAMINOPHEN 1 TABLET: 5; 325 TABLET ORAL at 12:01

## 2018-01-18 RX ADMIN — ASPIRIN 81 MG 81 MG: 81 TABLET ORAL at 12:01

## 2018-01-18 RX ADMIN — FAMOTIDINE 20 MG: 20 TABLET ORAL at 08:01

## 2018-01-18 RX ADMIN — ASPIRIN 81 MG 81 MG: 81 TABLET ORAL at 09:01

## 2018-01-18 RX ADMIN — AMLODIPINE BESYLATE 10 MG: 5 TABLET ORAL at 09:01

## 2018-01-18 RX ADMIN — ASPIRIN 81 MG 81 MG: 81 TABLET ORAL at 08:01

## 2018-01-18 RX ADMIN — FAMOTIDINE 20 MG: 20 TABLET ORAL at 12:01

## 2018-01-18 RX ADMIN — FLUTICASONE PROPIONATE 100 MCG: 50 SPRAY, METERED NASAL at 09:01

## 2018-01-18 RX ADMIN — RAMELTEON 8 MG: 8 TABLET, FILM COATED ORAL at 09:01

## 2018-01-18 NOTE — PLAN OF CARE
Problem: Physical Therapy Goal  Goal: Physical Therapy Goal  Goals to be met by: 2018     Patient will increase functional independence with mobility by performin. Supine to sit with Stand-by Assistance  2. Sit to stand transfer with Stand-by Assistance  3. Bed to chair transfer with Contact Guard Assistance using Rolling Walker with platform  4. Gait  x 25 feet with Contact Guard Assistance using Rolling Walker with platform.     Outcome: Ongoing (interventions implemented as appropriate)  Recommend IPR placement   DME TBD by facility

## 2018-01-18 NOTE — ED NOTES
Pt. Resting comfortably in bed, denies c/o pain or discomfort at this time. Toileting offered, pt. Refuses at this time.

## 2018-01-18 NOTE — PT/OT/SLP PROGRESS
Physical Therapy      Patient Name:  Roberto Gonzalez   MRN:  [id number]    Patient not seen this pm 2/2 fatigue and decrease level of alertness. Will follow up as able    Emerson Bynum, PT

## 2018-01-18 NOTE — ED NOTES
Dr. Bear at the bedside informing pt. And pt's daughter that she will be admitted. Orthopedics will speak to them.

## 2018-01-18 NOTE — PLAN OF CARE
Problem: Occupational Therapy Goal  Goal: Occupational Therapy Goal  Goals to be met by: 2/18/18     Patient will increase functional independence with ADLs by performing:    LE Dressing with Minimal Assistance.  Grooming while standing with Contact Guard Assistance.  Toileting from bedside commode with Minimal Assistance for hygiene and clothing management.   Supine to sit with Minimal Assistance.  Stand pivot transfers with Minimal Assistance.  Toilet transfer to bedside commode with Minimal Assistance.  Increased functional strength to WFL for self care skills and functional mobility.  Upper extremity exercise program x10 reps per handout, with independence.    Outcome: Ongoing (interventions implemented as appropriate)  Pt would benefit from cont OT services in order to maximize functional independence. Recommending IP rehab at d/c

## 2018-01-18 NOTE — PROGRESS NOTES
Interval:   Pain controlled  Denies numbness or tingling  Yet to work with PT    Vitals:  Temp:  [98 °F (36.7 °C)-98.7 °F (37.1 °C)] 98 °F (36.7 °C)  Pulse:  [63-93] 88  Resp:  [16-20] 20  SpO2:  [95 %-100 %] 97 %  BP: (105-144)/(49-65) 144/64    Scheduled Meds:    amLODIPine  10 mg Oral Daily    aspirin  81 mg Oral BID    famotidine  20 mg Oral BID    fluticasone  2 spray Each Nare Daily    pravastatin  10 mg Oral Daily     Continuous Infusions:   PRN Meds: acetaminophen, diphenhydrAMINE, hydrocodone-acetaminophen 5-325mg, ondansetron, promethazine (PHENERGAN) IVPB, ramelteon, sodium chloride 0.9%    Diet: Diet Adult Regular    Trended Lab Data:    Recent Labs  Lab 01/17/18  1932   WBC 12.70   HGB 14.4   HCT 42.6      MCV 93   RDW 12.5      K 3.7      CO2 28   BUN 10   CREATININE 0.9   *   PROT 7.6   ALBUMIN 4.0   BILITOT 0.6   AST 28   ALKPHOS 83   ALT 25       I/O last 3 completed shifts:  In: 500 [I.V.:500]  Out: -     Exam:  RUE  Splint in place  SILT to fingers  Wiggles fingers  BCR to hand    RLE  Some pain with log roll hip  Motor and sensation intact to toes  BCR to toes      Impression:  72F with R pubic rami fx and R  fx     Plan:  PT this am  Platform WB RUE  WBAT BLE  Regular diet   DVT ppx ASA 81 BID  Pepcid

## 2018-01-18 NOTE — ED NOTES
Pt. Resting comfortably in bed, cardiac monitor shows sinus rhythm with HR- 100, skin PWD. Family at the bedside.

## 2018-01-18 NOTE — ED PROVIDER NOTES
Encounter Date: 2018       History     Chief Complaint   Patient presents with    Fall     slipped and fell onto rt wrist,rt hip without shortening or rotation, + pain.     The patient is a 9-year-old female who presents emergency department after slipping and falling on the ice this evening.  The patient was going to  her  who is being discharged from the hospital at [facility].  She slipped and fell landing on her right side.  She has pain to her right wrist and pain to her right buttocks.  She did not hit her head, no loss of consciousness, no neck pain chest pain abdominal pain or back pain.          Review of patient's allergies indicates:  No Known Allergies  Past Medical History:   Diagnosis Date    Benign essential HTN 9/15/2016    Hyperlipidemia     Osteoarthritis     Ulcer of abdomen wall      Past Surgical History:   Procedure Laterality Date     SECTION      KNEE ARTHROSCOPY      TOTAL KNEE ARTHROPLASTY Bilateral 2016     Family History   Problem Relation Age of Onset    Coronary artery disease Brother 56    Diabetes Brother     Hypertension Brother     Diabetes Mother     Cancer Neg Hx      Social History   Substance Use Topics    Smoking status: Never Smoker    Smokeless tobacco: Never Used    Alcohol use No     Review of Systems   Constitutional: Negative for activity change, appetite change and fever.   HENT: Negative for congestion and sore throat.    Respiratory: Negative for cough and shortness of breath.    Cardiovascular: Negative for chest pain.   Gastrointestinal: Negative for abdominal pain, diarrhea, nausea and vomiting.   Genitourinary: Positive for pelvic pain. Negative for difficulty urinating and frequency.   Musculoskeletal: Negative for back pain and neck pain.   Skin: Negative for rash.   Neurological: Negative for weakness, light-headedness and headaches.   Psychiatric/Behavioral: Negative for confusion and suicidal ideas.        Physical Exam     Initial Vitals [01/17/18 1516]   BP Pulse Resp Temp SpO2   (!) 130/52 74 18 98.2 °F (36.8 °C) 99 %      MAP       78         Physical Exam    Nursing note and vitals reviewed.  Constitutional: She appears well-developed and well-nourished.   HENT:   Head: Normocephalic and atraumatic.   Eyes: Pupils are equal, round, and reactive to light.   Neck: Normal range of motion. Neck supple.   Pulmonary/Chest: Breath sounds normal. She exhibits no tenderness.   Abdominal: Soft. Bowel sounds are normal. There is no tenderness.   Musculoskeletal:   Pain with ROM of the right hip, pain with palpation of the right buttocks.  Obvious swelling and deformity to the right wrist. Hands with FROM, neurovasc intact.   Neurological: She is alert and oriented to person, place, and time. She has normal strength. No sensory deficit.   Skin: Skin is warm and dry.   Psychiatric: She has a normal mood and affect. Her behavior is normal. Judgment and thought content normal.         ED Course   Procedures  Labs Reviewed   CBC W/ AUTO DIFFERENTIAL - Abnormal; Notable for the following:        Result Value    MCH 31.3 (*)     Gran # 10.6 (*)     Mono # 1.1 (*)     Gran% 83.4 (*)     Lymph% 7.6 (*)     All other components within normal limits   COMPREHENSIVE METABOLIC PANEL - Abnormal; Notable for the following:     Glucose 115 (*)     Calcium 10.7 (*)     All other components within normal limits   PROTIME-INR   URINALYSIS             Medical Decision Making:   Clinical Tests:   Lab Tests: Ordered and Reviewed  The following lab test(s) were unremarkable: CBC, CMP, Urinalysis and PT  Radiological Study: Ordered and Reviewed                   ED Course      Clinical Impression:   The primary encounter diagnosis was Closed nondisplaced fracture of anterior wall of right acetabulum, initial encounter. Diagnoses of Trauma, Preop examination, and Closed Colles' fracture of right radius, initial encounter were also pertinent  to this visit.                           Darcy Bear MD  01/17/18 9709

## 2018-01-18 NOTE — H&P
Consult:   Pubic Rami FX  R DR rodriguez     HPI:  72F slip and fall on ice today  C/o R hip pain and R wrist pain  Denies any new numbness or tingling  Denies pain elsewhere     PMH:        Past Medical History:   Diagnosis Date    Benign essential HTN 9/15/2016    Hyperlipidemia      Osteoarthritis      Ulcer of abdomen wall           PSH:    has a past surgical history that includes  section; Knee arthroscopy; and Total knee arthroplasty (Bilateral, 2016).     SOCIAL:    reports that she has never smoked. She has never used smokeless tobacco. She reports that she does not drink alcohol or use drugs.     MEDS:   No current facility-administered medications on file prior to encounter.              Current Outpatient Prescriptions on File Prior to Encounter   Medication Sig Dispense Refill    amlodipine (NORVASC) 10 MG tablet TAKE 1 TABLET (10 MG TOTAL) BY MOUTH ONCE DAILY. 30 tablet 10    aspirin 81 MG Chew Take 1 tablet (81 mg total) by mouth 2 (two) times daily.   0    fluticasone (FLONASE) 50 mcg/actuation nasal spray 2 sprays by Each Nare route once daily. 16 g 2    hydrochlorothiazide (HYDRODIURIL) 25 MG tablet Take 1 tablet (25 mg total) by mouth once daily. (Patient taking differently: Take 25 mg by mouth once daily. ) 30 tablet 11    multivitamin (THERAGRAN) per tablet Take 1 tablet by mouth once daily.        pravastatin (PRAVACHOL) 10 MG tablet TAKE 1 TABLET (10 MG TOTAL) BY MOUTH ONCE DAILY. 90 tablet 3    ranitidine (ZANTAC) 150 MG tablet Take 150 mg by mouth 2 (two) times daily.             ALLERGY:       Allergies as of 2018    (No Known Allergies)         Vitals:      Vitals:     18   BP: (!) 105/49   Pulse: 74   Resp: 16   Temp:           Labs:     Recent Labs  Lab 18  1932   WBC 12.70   HGB 14.4   HCT 42.6      MCV 93   RDW 12.5      K 3.7      CO2 28   BUN 10   CREATININE 0.9   *   PROT 7.6   ALBUMIN 4.0   BILITOT 0.6   AST 28   ALKPHOS  83   ALT 25         Coags:         Lab Results   Component Value Date     INR 1.0 01/17/2018            Exam:  RUE  Deformity about wrist  SILT to fingers  Wiggles fingers  BCR to fingers  No open wound  No pain elsewhere in UE     LUE  SILT to fingers  Wiggles fingers  BCR to fingers  No open wound  No pain in UE     RLE  No open wounds  Sensation and motor intact to foot  1+ Dp, BCR  R Hip pain with log roll  No other pain to LE     LLE  No open wounds  Sensation and motor intact to foot  1+ Dp, BCR  No pain with ROM LE  No other pain to LE     Radiology:  AP and CT pelvis and R hip: R pubic rami fxs and high root fx entering low in dome of acetabulum  XR R wrist: R DR rodriguez     Impression:  72F with R pubic rami fx and R DR rodriguez     Plan:  R DR rodriguez CR and splinted  Will plan to admit overnight for pain control and PT in the AM for WB training  WBAT BLE  NWB RUE  Regular diet   DVT ppx ASA 81 BID  Pepcid

## 2018-01-18 NOTE — CONSULTS
[facility] Hospital Medicine  Consult Note    Patient Name: Roberto Gonzalez  MRN: [id number]  Admission Date: 2018  Hospital Length of Stay: 0 days  Attending Physician: Dr. Audie Foster III  Primary Care Provider: Esau Butler MD           Patient information was obtained from patient, past medical records and ER records.     Inpatient consult to Family Practice  Consult performed by: LUCINDA ALONSO  Consult ordered by: TAMMY MARIANO        Subjective:     Principal Problem: Trauma    Chief Complaint:   Chief Complaint   Patient presents with    Fall     slipped and fell onto rt wrist,rt hip without shortening or rotation, + pain.        HPI: 72F with pmx of HTN and HLD. She slipped and fell on ice yesterday and landed on her right arm, was found to have fracture on her wrist.       Past Medical History:   Diagnosis Date    Benign essential HTN 9/15/2016    Hyperlipidemia     Osteoarthritis     Ulcer of abdomen wall        Past Surgical History:   Procedure Laterality Date     SECTION      KNEE ARTHROSCOPY      TOTAL KNEE ARTHROPLASTY Bilateral 2016       Review of patient's allergies indicates:  No Known Allergies    No current facility-administered medications on file prior to encounter.      Current Outpatient Prescriptions on File Prior to Encounter   Medication Sig    amlodipine (NORVASC) 10 MG tablet TAKE 1 TABLET (10 MG TOTAL) BY MOUTH ONCE DAILY.    fluticasone (FLONASE) 50 mcg/actuation nasal spray 2 sprays by Each Nare route once daily.    hydrochlorothiazide (HYDRODIURIL) 25 MG tablet Take 1 tablet (25 mg total) by mouth once daily. (Patient taking differently: Take 25 mg by mouth once daily. )    multivitamin (THERAGRAN) per tablet Take 1 tablet by mouth once daily.    pravastatin (PRAVACHOL) 10 MG tablet TAKE 1 TABLET (10 MG TOTAL) BY MOUTH ONCE DAILY.    ranitidine (ZANTAC) 150 MG tablet Take 150 mg by mouth 2 (two) times daily.     Family History      Problem Relation (Age of Onset)    Coronary artery disease Brother (56)    Diabetes Brother, Mother    Hypertension Brother        Social History Main Topics    Smoking status: Never Smoker    Smokeless tobacco: Never Used    Alcohol use No    Drug use: No    Sexual activity: Yes     Review of Systems   Constitutional: Negative for activity change, appetite change, chills, diaphoresis, fatigue and fever.   HENT: Negative for congestion, hearing loss, sinus pain, sinus pressure and sneezing.    Eyes: Negative for visual disturbance.   Respiratory: Negative for apnea, cough, chest tightness, shortness of breath and wheezing.    Cardiovascular: Negative for chest pain, palpitations and leg swelling.   Gastrointestinal: Negative for abdominal distention, abdominal pain, anal bleeding, blood in stool, constipation, diarrhea, nausea and vomiting.   Endocrine: Negative for polyuria.   Genitourinary: Negative for difficulty urinating, dysuria and hematuria.   Musculoskeletal: Positive for arthralgias. Negative for back pain, gait problem and joint swelling.     Objective:     Vital Signs (Most Recent):  Temp: 98 °F (36.7 °C) (01/18/18 0822)  Pulse: 88 (01/18/18 0822)  Resp: 20 (01/18/18 0822)  BP: (!) 144/64 (01/18/18 0822)  SpO2: 97 % (01/18/18 0746) Vital Signs (24h Range):  Temp:  [98 °F (36.7 °C)-98.7 °F (37.1 °C)] 98 °F (36.7 °C)  Pulse:  [63-93] 88  Resp:  [16-20] 20  SpO2:  [95 %-100 %] 97 %  BP: (105-144)/(49-65) 144/64     Weight: 73.6 kg (162 lb 4.1 oz)  Body mass index is 27 kg/m².    Physical Exam   Constitutional: She is oriented to person, place, and time. She appears well-developed and well-nourished.   HENT:   Head: Normocephalic and atraumatic.   Nose: Nose normal.   Eyes: Conjunctivae and EOM are normal. Right eye exhibits no discharge. Left eye exhibits no discharge. No scleral icterus.   Neck: Normal range of motion. Neck supple. No JVD present.   Cardiovascular: Normal rate, regular rhythm,  normal heart sounds and intact distal pulses.  Exam reveals no gallop and no friction rub.    No murmur heard.  Pulmonary/Chest: Effort normal and breath sounds normal. No respiratory distress. She has no wheezes. She has no rales. She exhibits no tenderness.   Abdominal: Soft. Bowel sounds are normal. She exhibits no distension and no mass. There is no tenderness. There is no rebound and no guarding.   Musculoskeletal: Normal range of motion. She exhibits no edema, tenderness or deformity.   R arm is wrapped. She said at rest there's no pain, but there's some pain when she moves. She can tolerate the pain well.   Neurological: She is alert and oriented to person, place, and time.   Skin: Skin is warm. No rash noted. She is not diaphoretic. No erythema. No pallor.   Psychiatric: She has a normal mood and affect. Her behavior is normal. Judgment and thought content normal.   Nursing note and vitals reviewed.      Significant Labs:  Recent Labs      01/17/18 1932   WBC  12.70   HGB  14.4   HCT  42.6   PLT  236   MCV  93   RDW  12.5       Recent Labs      01/17/18 1932   NA  140   K  3.7   CL  101   CO2  28   GLU  115*   BUN  10   CREATININE  0.9   CALCIUM  10.7*   PROT  7.6   ALBUMIN  4.0   BILITOT  0.6   ALKPHOS  83   AST  28   ALT  25   ANIONGAP  11   ESTGFRAFRICA  >60   EGFRNONAA  >60       No results for input(s): MG, PHOS in the last 72 hours.    A1c: No results found for: HGBA1C, Last Gluc: No results for input(s): POCTGLUCOSE in the last 168 hours.    TSH:   Lab Results   Component Value Date    TSH 2.868 04/25/2017       Cardiac Enzymes  @CEU@    [facility]    Recent Labs  Lab 01/17/18 1932   INR 1.0       UA  No results for input(s): COLORU, CLARITYU, SPECGRAV, PHUR, PROTEINUA, GLUCOSEU, BLOODU, WBCU, RBCU, BACTERIA, MUCUS in the last 24 hours.    Invalid input(s):  BILIRUBINCON    Micro  Microbiology Results (last 7 days)     ** No results found for the last 168 hours. **           ABG  No results for  input(s): PH, PCO2, PO2, HCO3, POCSATURATED, BE in the last 168 hours.              Significant Imaging:  Imaging Results          X-Ray Wrist 2 View Right (Final result)  Result time 01/17/18 21:41:00    Final result by Bentley Byrd MD (01/17/18 21:41:00)                 Impression:        As above.      Electronically signed by: BENTLEY BYRD MD, MD  Date:     01/17/18  Time:    21:41              Narrative:    COMPARISON: Right wrist series earlier same day    FINDINGS: 2 views right wrist.      Status post interval additional closed reduction of previously noted distal radial and ulnar styloid fractures, now with near anatomic positioning and alignment. No new displaced fracture definitively seen allowing for overlying cast material.                             CT Pelvis Without Contrast (Final result)  Result time 01/17/18 20:53:23    Final result by Eldon Mehta MD (01/17/18 20:53:23)                 Impression:      Acute right superior and inferior pubic rami fractures.  Intra-articular extension of superior pubic ramus fracture which involves the inferomedial wall of the acetabulum.      Electronically signed by: ELDON MEHTA MD  Date:     01/17/18  Time:    20:53              Narrative:    Clinical indication: 72 year old female with pelvic fracture.    Comparison: Right hip and pelvic radiographs from the same date.    Technique: 2.5 mm axial images with sagittal and coronal reformats were obtained through the pelvis without the use of IV contrast.    Findings:  There is acute fracture involving the lateral aspect of the right superior pubic ramus.  There is intra-articular extension of the fracture plane which involves the inferomedial acetabular wall.  Additional acute displaced fracture is seen involving the right inferior pubic ramus.  No additional fractures are seen.  No evidence of proximal femoral fractures.  No evidence of hip dislocation.    Partially visualized intra-abdominal and  intrapelvic contents show no acute abnormalities.  Urinary bladder is significantly distended.                             X-Ray Wrist 2 View Right (Final result)  Result time 01/17/18 20:32:26    Final result by Madeline Mehta MD (01/17/18 20:32:26)                 Impression:      As above.      Electronically signed by: MADELINE MEHTA MD  Date:     01/17/18  Time:    20:32              Narrative:    Right wrist 2 views.  Comparison: 16:19.    Examination limited by overlying casting material.  There is redemonstration of acute distal radius fracture with persistent 4 mm lateral displacement and mild volar angulation.  Fracture of the ulnar styloid tip is not well appreciated secondary to overlying artifact.                             X-Ray Wrist Complete Right (Final result)  Result time 01/17/18 16:36:18    Final result by Teofilo Calderón MD (01/17/18 16:36:18)                 Impression:     Acute closed, Comminuted impacted fracture distal radius, fracture ulnar styloid process and      Electronically signed by: GONZALO CALDERÓN MD  Date:     01/17/18  Time:    16:36              Narrative:    3 views right wrist.  Comminuted impacted fracture distal radial shaft metaphysis and epiphysis, fracture extends into the radiocarpal joint, fracture ulnar styloid process, adjacent soft tissue swelling.  No dislocation.  Mild lateral dorsal angulation distal radial fracture fragment.                             X-Ray Pelvis Routine AP (Final result)  Result time 01/17/18 16:36:33    Final result by Timmy Roper MD (01/17/18 16:36:33)                 Impression:        Superior and inferior pubic rami fractures on the right.  The superior pubic rami fracture may involve the acetabulum.  Further evaluation can be obtained with CT of the pelvis.      Electronically signed by: TIMMY ROPER MD  Date:     01/17/18  Time:    16:36              Narrative:    Comparison: None    Technique: AP  pelvis.    Findings: There are superior and inferior pubic rami fractures on the right.  The superior pubic rami fracture may involve the acetabulum.  There is no evidence of hip dislocation or definite other displaced fractures.  Soft tissue structures demonstrate no acute abnormality.                             X-Ray Hip 2 View Right (Final result)  Result time 01/17/18 16:39:49    Final result by Bentley Byrd MD (01/17/18 16:39:49)                 Impression:        Acute fractures of the right pelvis, as above.      Electronically signed by: BENTLEY BYRD MD, MD  Date:     01/17/18  Time:    16:39              Narrative:    COMPARISON: Pelvic radiograph same day    FINDINGS: 2 views right hip.        Generalized osteopenia. There are acute fractures with displacement involving the medial aspect of the right iliac body with deformity of the iliopectineal line and also left inferior pubic ramus with deformity of the obturator ring.  There is also questionable disruption of the more inferior aspect of the anterior wall of the right acetabulum. The right ileo-ischial line and ischial teardrop appear grossly intact.    Imaged proximal right femur and remainder of the pelvis otherwise appear intact. No dislocation or destructive osseous process identified.  Mild degenerative change.   No subcutaneous emphysema or radiodense retained foreign body.                                Assessment/Plan:     * Trauma    S/p fall landed on right arm          GERD (gastroesophageal reflux disease)    Famotidine 20mg BID          Closed fracture of right wrist    Closed fixation done by ortho  Pain and procedure tolerated well  PT/OT on board          Benign essential HTN    Patient was taking half a tablet a day but was not able to take it in the past week because her  is also in the hospital  Started home regimen.   BP stable 140s        Hyperlipidemia    Started home statin            VTE Risk Mitigation          Ordered     Place ZAID hose  Until discontinued      01/17/18 2240     Medium Risk of VTE  Once      01/17/18 2240     Place sequential compression device  Until discontinued      01/17/18 2240              Thank you for your consult. Blood pressure is under control. Will sign off now. Please let us know if you have any questions.    Queta Brar MD  Department of Hospital Medicine   [facility]

## 2018-01-18 NOTE — PROGRESS NOTES
made phone contact with long-term care services and completed a level of care eligibility tool (LOCET) for nursing facility screening if rehab is not authorized by insurance.

## 2018-01-18 NOTE — SUBJECTIVE & OBJECTIVE
Past Medical History:   Diagnosis Date    Benign essential HTN 9/15/2016    Hyperlipidemia     Osteoarthritis     Ulcer of abdomen wall        Past Surgical History:   Procedure Laterality Date     SECTION      KNEE ARTHROSCOPY      TOTAL KNEE ARTHROPLASTY Bilateral 2016       Review of patient's allergies indicates:  No Known Allergies    No current facility-administered medications on file prior to encounter.      Current Outpatient Prescriptions on File Prior to Encounter   Medication Sig    amlodipine (NORVASC) 10 MG tablet TAKE 1 TABLET (10 MG TOTAL) BY MOUTH ONCE DAILY.    fluticasone (FLONASE) 50 mcg/actuation nasal spray 2 sprays by Each Nare route once daily.    hydrochlorothiazide (HYDRODIURIL) 25 MG tablet Take 1 tablet (25 mg total) by mouth once daily. (Patient taking differently: Take 25 mg by mouth once daily. )    multivitamin (THERAGRAN) per tablet Take 1 tablet by mouth once daily.    pravastatin (PRAVACHOL) 10 MG tablet TAKE 1 TABLET (10 MG TOTAL) BY MOUTH ONCE DAILY.    ranitidine (ZANTAC) 150 MG tablet Take 150 mg by mouth 2 (two) times daily.     Family History     Problem Relation (Age of Onset)    Coronary artery disease Brother (56)    Diabetes Brother, Mother    Hypertension Brother        Social History Main Topics    Smoking status: Never Smoker    Smokeless tobacco: Never Used    Alcohol use No    Drug use: No    Sexual activity: Yes     Review of Systems   Constitutional: Negative for activity change, appetite change, chills, diaphoresis, fatigue and fever.   HENT: Negative for congestion, hearing loss, sinus pain, sinus pressure and sneezing.    Eyes: Negative for visual disturbance.   Respiratory: Negative for apnea, cough, chest tightness, shortness of breath and wheezing.    Cardiovascular: Negative for chest pain, palpitations and leg swelling.   Gastrointestinal: Negative for abdominal distention, abdominal pain, anal bleeding, blood in stool,  constipation, diarrhea, nausea and vomiting.   Endocrine: Negative for polyuria.   Genitourinary: Negative for difficulty urinating, dysuria and hematuria.   Musculoskeletal: Positive for arthralgias. Negative for back pain, gait problem and joint swelling.     Objective:     Vital Signs (Most Recent):  Temp: 98 °F (36.7 °C) (01/18/18 0822)  Pulse: 88 (01/18/18 0822)  Resp: 20 (01/18/18 0822)  BP: (!) 144/64 (01/18/18 0822)  SpO2: 97 % (01/18/18 0746) Vital Signs (24h Range):  Temp:  [98 °F (36.7 °C)-98.7 °F (37.1 °C)] 98 °F (36.7 °C)  Pulse:  [63-93] 88  Resp:  [16-20] 20  SpO2:  [95 %-100 %] 97 %  BP: (105-144)/(49-65) 144/64     Weight: 73.6 kg (162 lb 4.1 oz)  Body mass index is 27 kg/m².    Physical Exam   Constitutional: She is oriented to person, place, and time. She appears well-developed and well-nourished.   HENT:   Head: Normocephalic and atraumatic.   Nose: Nose normal.   Eyes: Conjunctivae and EOM are normal. Right eye exhibits no discharge. Left eye exhibits no discharge. No scleral icterus.   Neck: Normal range of motion. Neck supple. No JVD present.   Cardiovascular: Normal rate, regular rhythm, normal heart sounds and intact distal pulses.  Exam reveals no gallop and no friction rub.    No murmur heard.  Pulmonary/Chest: Effort normal and breath sounds normal. No respiratory distress. She has no wheezes. She has no rales. She exhibits no tenderness.   Abdominal: Soft. Bowel sounds are normal. She exhibits no distension and no mass. There is no tenderness. There is no rebound and no guarding.   Musculoskeletal: Normal range of motion. She exhibits no edema, tenderness or deformity.   R arm is wrapped. She said at rest there's no pain, but there's some pain when she moves. She can tolerate the pain well.   Neurological: She is alert and oriented to person, place, and time.   Skin: Skin is warm. No rash noted. She is not diaphoretic. No erythema. No pallor.   Psychiatric: She has a normal mood and  affect. Her behavior is normal. Judgment and thought content normal.   Nursing note and vitals reviewed.      Significant Labs:  Recent Labs      01/17/18 1932   WBC  12.70   HGB  14.4   HCT  42.6   PLT  236   MCV  93   RDW  12.5       Recent Labs      01/17/18 1932   NA  140   K  3.7   CL  101   CO2  28   GLU  115*   BUN  10   CREATININE  0.9   CALCIUM  10.7*   PROT  7.6   ALBUMIN  4.0   BILITOT  0.6   ALKPHOS  83   AST  28   ALT  25   ANIONGAP  11   ESTGFRAFRICA  >60   EGFRNONAA  >60       No results for input(s): MG, PHOS in the last 72 hours.    A1c: No results found for: HGBA1C, Last Gluc: No results for input(s): POCTGLUCOSE in the last 168 hours.    TSH:   Lab Results   Component Value Date    TSH 2.868 04/25/2017       Cardiac Enzymes  @[facility]@    [facility]    Recent Labs  Lab 01/17/18 1932   INR 1.0       UA  No results for input(s): COLORU, CLARITYU, SPECGRAV, PHUR, PROTEINUA, GLUCOSEU, BLOODU, WBCU, RBCU, BACTERIA, MUCUS in the last 24 hours.    Invalid input(s):  BILIRUBINCON    Micro  Microbiology Results (last 7 days)     ** No results found for the last 168 hours. **           ABG  No results for input(s): PH, PCO2, PO2, HCO3, POCSATURATED, BE in the last 168 hours.              Significant Imaging:  Imaging Results          X-Ray Wrist 2 View Right (Final result)  Result time 01/17/18 21:41:00    Final result by Bentley Byrd MD (01/17/18 21:41:00)                 Impression:        As above.      Electronically signed by: BENTLEY BYRD MD, MD  Date:     01/17/18  Time:    21:41              Narrative:    COMPARISON: Right wrist series earlier same day    FINDINGS: 2 views right wrist.      Status post interval additional closed reduction of previously noted distal radial and ulnar styloid fractures, now with near anatomic positioning and alignment. No new displaced fracture definitively seen allowing for overlying cast material.                             CT Pelvis Without Contrast (Final result)   Result time 01/17/18 20:53:23    Final result by Eldon Mehta MD (01/17/18 20:53:23)                 Impression:      Acute right superior and inferior pubic rami fractures.  Intra-articular extension of superior pubic ramus fracture which involves the inferomedial wall of the acetabulum.      Electronically signed by: ELDON MEHTA MD  Date:     01/17/18  Time:    20:53              Narrative:    Clinical indication: 72 year old female with pelvic fracture.    Comparison: Right hip and pelvic radiographs from the same date.    Technique: 2.5 mm axial images with sagittal and coronal reformats were obtained through the pelvis without the use of IV contrast.    Findings:  There is acute fracture involving the lateral aspect of the right superior pubic ramus.  There is intra-articular extension of the fracture plane which involves the inferomedial acetabular wall.  Additional acute displaced fracture is seen involving the right inferior pubic ramus.  No additional fractures are seen.  No evidence of proximal femoral fractures.  No evidence of hip dislocation.    Partially visualized intra-abdominal and intrapelvic contents show no acute abnormalities.  Urinary bladder is significantly distended.                             X-Ray Wrist 2 View Right (Final result)  Result time 01/17/18 20:32:26    Final result by Eldon Mehta MD (01/17/18 20:32:26)                 Impression:      As above.      Electronically signed by: ELDON MEHTA MD  Date:     01/17/18  Time:    20:32              Narrative:    Right wrist 2 views.  Comparison: 16:19.    Examination limited by overlying casting material.  There is redemonstration of acute distal radius fracture with persistent 4 mm lateral displacement and mild volar angulation.  Fracture of the ulnar styloid tip is not well appreciated secondary to overlying artifact.                             X-Ray Wrist Complete Right (Final result)  Result time 01/17/18 16:36:18     Final result by Teofilo Calderón MD (01/17/18 16:36:18)                 Impression:     Acute closed, Comminuted impacted fracture distal radius, fracture ulnar styloid process and      Electronically signed by: GONZALO CALDERÓN MD  Date:     01/17/18  Time:    16:36              Narrative:    3 views right wrist.  Comminuted impacted fracture distal radial shaft metaphysis and epiphysis, fracture extends into the radiocarpal joint, fracture ulnar styloid process, adjacent soft tissue swelling.  No dislocation.  Mild lateral dorsal angulation distal radial fracture fragment.                             X-Ray Pelvis Routine AP (Final result)  Result time 01/17/18 16:36:33    Final result by Timmy Roper MD (01/17/18 16:36:33)                 Impression:        Superior and inferior pubic rami fractures on the right.  The superior pubic rami fracture may involve the acetabulum.  Further evaluation can be obtained with CT of the pelvis.      Electronically signed by: TIMMY ROPER MD  Date:     01/17/18  Time:    16:36              Narrative:    Comparison: None    Technique: AP pelvis.    Findings: There are superior and inferior pubic rami fractures on the right.  The superior pubic rami fracture may involve the acetabulum.  There is no evidence of hip dislocation or definite other displaced fractures.  Soft tissue structures demonstrate no acute abnormality.                             X-Ray Hip 2 View Right (Final result)  Result time 01/17/18 16:39:49    Final result by Carl Caicedo MD (01/17/18 16:39:49)                 Impression:        Acute fractures of the right pelvis, as above.      Electronically signed by: CARL CAICEDO MD, MD  Date:     01/17/18  Time:    16:39              Narrative:    COMPARISON: Pelvic radiograph same day    FINDINGS: 2 views right hip.        Generalized osteopenia. There are acute fractures with displacement involving the medial aspect of the right iliac body with  deformity of the iliopectineal line and also left inferior pubic ramus with deformity of the obturator ring.  There is also questionable disruption of the more inferior aspect of the anterior wall of the right acetabulum. The right ileo-ischial line and ischial teardrop appear grossly intact.    Imaged proximal right femur and remainder of the pelvis otherwise appear intact. No dislocation or destructive osseous process identified.  Mild degenerative change.   No subcutaneous emphysema or radiodense retained foreign body.

## 2018-01-18 NOTE — ED TRIAGE NOTES
Pt. Care assumed. Pt. Is awake, alert and oriented. Pt. Placed on cardiac, BP and continuous pulse oximetry. Pt. States she slipped and fell today at home. Bed in the low position, side rails elevated x 2.

## 2018-01-18 NOTE — PT/OT/SLP EVAL
Physical Therapy Evaluation    Patient Name:  Roberto Gonzalez   MRN:  [id number]    Recommendations:     Discharge Recommendations:  rehabilitation facility   Discharge Equipment Recommendations: walker, rolling   Barriers to discharge: Decreased caregiver support    Assessment:     Roberto Gonzalez is a 72 y.o. female admitted with a medical diagnosis of Trauma.  She presents with the following impairments/functional limitations:  weakness, gait instability, decreased upper extremity function, decreased lower extremity function, pain, impaired self care skills, impaired functional mobilty Patient with decline in functional mobility 2/2 fractures and pain Patient would benefit from IPR .    Rehab Prognosis:  Good; patient would benefit from acute skilled PT services to address these deficits and reach maximum level of function.      Recent Surgery: * No surgery found *      Plan:     During this hospitalization, patient to be seen BID to address the above listed problems via gait training, therapeutic activities, therapeutic exercises  · Plan of Care Expires:      Plan of Care Reviewed with: patient, daughter    Subjective     Communicated with primary nurse prior to session.  Patient found supine with HOB raised upon PT entry to room, agreeable to evaluation.      Chief Complaint: pain  Patient comments/goals: go home  Pain/Comfort:  · Pain Rating 1: 6/10  · Location - Side 1: Right  · Location - Orientation 1: generalized  · Location 1: hip    Patients cultural, spiritual, Congregational conflicts given the current situation:      Living Environment:  Lives with spouse in [facility] 3 steps in house BR and bedroom downstairs  Prior to admission, patients level of function was independent.  Patient has the following equipment: none.  DME owned (not currently used): none.  Upon discharge, patient will have assistance from family.    Objective:     Patient found with:       General Precautions: Standard, fall   Orthopedic  Precautions:RLE weight bearing as tolerated, RUE non weight bearing   Braces: N/A     Exams:  · RLE ROM: limited active and passive   · RLE Strength: poor control 2/2 pain  · LLE ROM: WFL  · LLE Strength: WFL    Functional Mobility:  · Bed Mobility:     · Supine to Sit: maximal assistance  · Sit to Supine: maximal assistance  · Transfers:     · Sit to Stand:  maximal assistance with platform walker  · Balance: poor +    AM-PAC 6 CLICK MOBILITY  Total Score:13       Therapeutic Activities and Exercises:   na    Patient left supine with call button in reach and family present.    GOALS:    Physical Therapy Goals        Problem: Physical Therapy Goal    Goal Priority Disciplines Outcome Goal Variances Interventions   Physical Therapy Goal     PT/OT, PT Ongoing (interventions implemented as appropriate)     Description:  Goals to be met by: 2018     Patient will increase functional independence with mobility by performin. Supine to sit with Stand-by Assistance  2. Sit to stand transfer with Stand-by Assistance  3. Bed to chair transfer with Contact Guard Assistance using Rolling Walker with platform  4. Gait  x 25 feet with Contact Guard Assistance using Rolling Walker with platform.                       History:     Past Medical History:   Diagnosis Date    Benign essential HTN 9/15/2016    Hyperlipidemia     Osteoarthritis     Ulcer of abdomen wall        Past Surgical History:   Procedure Laterality Date     SECTION      KNEE ARTHROSCOPY      TOTAL KNEE ARTHROPLASTY Bilateral 2016       Clinical Decision Making:     History  Co-morbidities and personal factors that may impact the plan of care Examination  Body Structures and Functions, activity limitations and participation restrictions that may impact the plan of care Clinical Presentation   Decision Making/ Complexity Score   Co-morbidities:   [] Time since onset of injury / illness / exacerbation  [] Status of current  condition  []Patient's cognitive status and safety concerns    [] Multiple Medical Problems (see med hx)  Personal Factors:   [] Patient's age  [] Prior Level of function   [] Patient's home situation (environment and family support)  [] Patient's level of motivation  [] Expected progression of patient      HISTORY:(criteria)    [x] 87827 - no personal factors/history    [] 12386 - has 1-2 personal factor/comorbidity     [] 49490 - has >3 personal factor/comorbidity     Body Regions:  [] Objective examination findings  [] Head     []  Neck  [] Trunk   [x] Upper Extremity  [x] Lower Extremity    Body Systems:  [] For communication ability, affect, cognition, language, and learning style: the assessment of the ability to make needs known, consciousness, orientation (person, place, and time), expected emotional /behavioral responses, and learning preferences (eg, learning barriers, education  needs)  [x] For the neuromuscular system: a general assessment of gross coordinated movement (eg, balance, gait, locomotion, transfers, and transitions) and motor function  (motor control and motor learning)  [x] For the musculoskeletal system: the assessment of gross symmetry, gross range of motion, gross strength, height, and weight  [] For the integumentary system: the assessment of pliability(texture), presence of scar formation, skin color, and skin integrity  [] For cardiovascular/pulmonary system: the assessment of heart rate, respiratory rate, blood pressure, and edema     Activity limitations:    [] Patient's cognitive status and saf ety concerns          [] Status of current condition      [x] Weight bearing restriction  [] Cardiopulmunary Restriction    Participation Restrictions:   [] Goals and goal agreement with the patient     [] Rehab potential (prognosis) and probable outcome      Examination of Body System: (criteria)    [] 04382 - addressing 1-2 elements    [x] 90324 - addressing a total of 3 or more elements      [] 41776 -  Addressing a total of 4 or more elements         Clinical Presentation: (criteria)  Stable - 98295     On examination of body system using standardized tests and measures patient presents with 1-2 elements from any of the following: body structures and functions, activity limitations, and/or participation restrictions.  Leading to a clinical presentation that is considered stable and/or uncomplicated                              Clinical Decision Making  (Eval Complexity):  Low- 74818     Time Tracking:     PT Received On: 01/18/18  PT Start Time: 1003     PT Stop Time: 1028  PT Total Time (min): 25 min     Billable Minutes: Evaluation 20      Emerson Bynum, PT  01/18/2018

## 2018-01-18 NOTE — PROGRESS NOTES
Dr. Brar unable to urinate at all today. Pt placed on bedpan several times today. Bladder scan showed 972cc. Dr. Brar notified and ordered in and out cath.

## 2018-01-18 NOTE — HPI
72F with pmx of HTN and HLD. She slipped and fell on ice yesterday and landed on her right arm, was found to have fracture on her wrist.

## 2018-01-18 NOTE — PROGRESS NOTES
attempted to make phone contact with [facility] , Linda Hathaway at [phone] ext [phone]. She was not available, left voice message regarding need for inpatient rehab at discharge.

## 2018-01-18 NOTE — PT/OT/SLP EVAL
Occupational Therapy   Evaluation    Name: Roberto Gonzalez  MRN: [id number]  Admitting Diagnosis:  Trauma      Recommendations:     Discharge Recommendations: rehabilitation facility  Discharge Equipment Recommendations:  bedside commode, walker, rolling, shower chair (platform for RUE )  Barriers to discharge:  Decreased caregiver support, Inaccessible home environment    History:     Occupational Profile:  Living Environment: Pt lives with  in 2 story home, 1 step to enter from side, 3 steps in front of home, [place]. Pt's  recently underwent brain surgery and was d/c from [facility] yesterday's date.   Previous level of function: independent   Equipment Owned:  none  Assistance upon Discharge: limited;  just d/c from [facility] after undergoing brain surgery; pt's dghtr, who is a physician, flew in from [place], however, is leaving tomorrow's date.     Past Medical History:   Diagnosis Date    Benign essential HTN 9/15/2016    Hyperlipidemia     Osteoarthritis     Ulcer of abdomen wall        Past Surgical History:   Procedure Laterality Date     SECTION      KNEE ARTHROSCOPY      TOTAL KNEE ARTHROPLASTY Bilateral 2016       Subjective     Chief Complaint: pain; dizziness   Patient/Family stated goals: return to PLOF  Communicated with: nsg prior to session.  Pain/Comfort:  · Pain Rating 1: 5/10 (with movement )  · Location - Side 1: Right  · Location - Orientation 1: generalized  · Location 1:  (RUE, RLE )  · Pain Addressed 1: Pre-medicate for activity, Reposition, Distraction    Objective:     Patient found with:      General Precautions: Standard, fall   Orthopedic Precautions:RUE non weight bearing, RLE weight bearing as tolerated   Braces:  (RUE cast )     Occupational Performance:    Bed Mobility:    · Patient completed Supine to Sit with maximal assistance  · Patient completed Sit to Supine with maximal assistance    Functional Mobility/Transfers:  · Patient completed Sit <>  Stand Transfer with maximal assistance  with  platform walker     Activities of Daily Living:  · UB Dressing: maximal assistance    · LB Dressing: total assistance      Cognitive/Visual Perceptual:  Cognitive/Psychosocial Skills:     -       Oriented to: Person, Place, Time and Situation   -       Follows Commands/attention:Follows multistep  commands  -       Communication: clear/fluent  -       Memory: No Deficits noted  -       Safety awareness/insight to disability: impaired   -       Mood/Affect/Coping skills/emotional control: Appropriate to situation    Physical Exam:  Balance:    -       dizziness affecting balance at this time; CGA sitting EOB and May standing EOB   Postural examination/scapula alignment:    -       Rounded shoulders  -       Forward head  Skin integrity: Bruising of RUE/LE  Sensation:    -       Intact  Dominant hand:    -       Right  Upper Extremity Range of Motion:  Casted elbow-digits; WFL at shoulder, however, limited 2/2 heaviness of cast; LUE WFL   Upper Extremity Strength: 4/5 LUE; not testing at RUE   Strength: limited 2/2 cast R hand; WFL L hand   Fine Motor Coordination: pt able to wiggle fingers and feel light touch at R hand; WFL L hand     Patient left supine with all lines intact, call button in reach, bed alarm on, nsg notified and dghtr  present    [facility] 6 Click:  [facility] Total Score: 12    Treatment & Education:  Pt with limited tolerance for EOB/Standing axs 2/2 increased dizziness throughout session. Dizziness did not subside after seated EOB for time period, and requesting to return to supine   Education:    Assessment:     Roberto Gonzalez is a 72 y.o. female with a medical diagnosis of Trauma.  She presents with s/p distal radius fracture RUE and pubic fracture RLE .  Performance deficits affecting function are gait instability, weakness, decreased upper extremity function, decreased lower extremity function, impaired balance, impaired endurance, impaired self care  "skills, impaired functional mobilty, pain, orthopedic precautions.      Rehab Prognosis:  Good ; patient would benefit from acute skilled OT services to address these deficits and reach maximum level of function.         Clinical Decision Makin.  OT Low:  "Pt evaluation falls under low complexity for evaluation coding due to performance deficits noted in 1-3 areas as stated above and 0 co-morbities affecting current functional status. Data obtained from problem focused assessments. No modifications or assistance was required for completion of evaluation. Only brief occupational profile and history review completed."     Plan:     Patient to be seen 5 x/week to address the above listed problems via self-care/home management, therapeutic activities, therapeutic exercises  · Plan of Care Expires: 18  · Plan of Care Reviewed with: patient, daughter    This Plan of care has been discussed with the patient who was involved in its development and understands and is in agreement with the identified goals and treatment plan    GOALS:    Occupational Therapy Goals        Problem: Occupational Therapy Goal    Goal Priority Disciplines Outcome Interventions   Occupational Therapy Goal     OT, PT/OT Ongoing (interventions implemented as appropriate)    Description:  Goals to be met by: 18     Patient will increase functional independence with ADLs by performing:    LE Dressing with Minimal Assistance.  Grooming while standing with Contact Guard Assistance.  Toileting from bedside commode with Minimal Assistance for hygiene and clothing management.   Supine to sit with Minimal Assistance.  Stand pivot transfers with Minimal Assistance.  Toilet transfer to bedside commode with Minimal Assistance.  Increased functional strength to WFL for self care skills and functional mobility.  Upper extremity exercise program x10 reps per handout, with independence.                      Time Tracking:     OT Date of " Treatment: 01/18/18  OT Start Time: 1003  OT Stop Time: 1029  OT Total Time (min): 26 min    Billable Minutes:Evaluation 26 co treatment with PT     Delores Watson OT  1/18/2018

## 2018-01-18 NOTE — CONSULTS
[facility] Consult Note    Consult:   Pubic Rami FX  R DR rodriguez    HPI:  72F slip and fall on ice today  C/o R hip pain and R wrist pain  Denies any new numbness or tingling  Denies pain elsewhere    PMH:   Past Medical History:   Diagnosis Date    Benign essential HTN 9/15/2016    Hyperlipidemia     Osteoarthritis     Ulcer of abdomen wall        PSH:    has a past surgical history that includes  section; Knee arthroscopy; and Total knee arthroplasty (Bilateral, 2016).    SOCIAL:    reports that she has never smoked. She has never used smokeless tobacco. She reports that she does not drink alcohol or use drugs.    MEDS:   No current facility-administered medications on file prior to encounter.      Current Outpatient Prescriptions on File Prior to Encounter   Medication Sig Dispense Refill    amlodipine (NORVASC) 10 MG tablet TAKE 1 TABLET (10 MG TOTAL) BY MOUTH ONCE DAILY. 30 tablet 10    aspirin 81 MG Chew Take 1 tablet (81 mg total) by mouth 2 (two) times daily.  0    fluticasone (FLONASE) 50 mcg/actuation nasal spray 2 sprays by Each Nare route once daily. 16 g 2    hydrochlorothiazide (HYDRODIURIL) 25 MG tablet Take 1 tablet (25 mg total) by mouth once daily. (Patient taking differently: Take 25 mg by mouth once daily. ) 30 tablet 11    multivitamin (THERAGRAN) per tablet Take 1 tablet by mouth once daily.      pravastatin (PRAVACHOL) 10 MG tablet TAKE 1 TABLET (10 MG TOTAL) BY MOUTH ONCE DAILY. 90 tablet 3    ranitidine (ZANTAC) 150 MG tablet Take 150 mg by mouth 2 (two) times daily.         ALLERGY:   Allergies as of 2018    (No Known Allergies)       Vitals:  Vitals:    18   BP: (!) 105/49   Pulse: 74   Resp: 16   Temp:        Labs:    Recent Labs  Lab 18  1932   WBC 12.70   HGB 14.4   HCT 42.6      MCV 93   RDW 12.5      K 3.7      CO2 28   BUN 10   CREATININE 0.9   *   PROT 7.6   ALBUMIN 4.0   BILITOT 0.6   AST 28   ALKPHOS 83   ALT 25        Coags:   Lab Results   Component Value Date    INR 1.0 01/17/2018         Exam:  RUE  Deformity about wrist  SILT to fingers  Wiggles fingers  BCR to fingers  No open wound  No pain elsewhere in UE    LUE  SILT to fingers  Wiggles fingers  BCR to fingers  No open wound  No pain in UE    RLE  No open wounds  Sensation and motor intact to foot  1+ Dp, BCR  R Hip pain with log roll  No other pain to LE    LLE  No open wounds  Sensation and motor intact to foot  1+ Dp, BCR  No pain with ROM LE  No other pain to LE    Radiology:  AP and CT pelvis and R hip: R pubic rami fxs and high root fx entering low in dome of acetabulum  XR R wrist: R DR rodriguez    Impression:  72F with R pubic rami fx and R  fx    Plan:  R DR rodriguez CR and splinted  Will plan to admit overnight for pain control and PT in the AM for WB training  WBAT BLE  NWB RUE  Regular diet   DVT ppx ASA 81 BID  Pepcid

## 2018-01-18 NOTE — ASSESSMENT & PLAN NOTE
Patient was taking half a tablet a day but was not able to take it in the past week because her  is also in the hospital  Started home regimen.   BP stable 140s

## 2018-01-19 LAB
ALBUMIN SERPL BCP-MCNC: 3.2 G/DL
ALP SERPL-CCNC: 62 U/L
ALT SERPL W/O P-5'-P-CCNC: 17 U/L
ANION GAP SERPL CALC-SCNC: 6 MMOL/L
AST SERPL-CCNC: 18 U/L
BASOPHILS # BLD AUTO: 0.02 K/UL
BASOPHILS NFR BLD: 0.3 %
BILIRUB SERPL-MCNC: 0.6 MG/DL
BUN SERPL-MCNC: 13 MG/DL
CALCIUM SERPL-MCNC: 9.1 MG/DL
CHLORIDE SERPL-SCNC: 104 MMOL/L
CO2 SERPL-SCNC: 29 MMOL/L
CREAT SERPL-MCNC: 0.8 MG/DL
DIFFERENTIAL METHOD: ABNORMAL
EOSINOPHIL # BLD AUTO: 0.1 K/UL
EOSINOPHIL NFR BLD: 1.9 %
ERYTHROCYTE [DISTWIDTH] IN BLOOD BY AUTOMATED COUNT: 12.7 %
EST. GFR  (AFRICAN AMERICAN): >60 ML/MIN/1.73 M^2
EST. GFR  (NON AFRICAN AMERICAN): >60 ML/MIN/1.73 M^2
GLUCOSE SERPL-MCNC: 123 MG/DL
HCT VFR BLD AUTO: 36.5 %
HGB BLD-MCNC: 12 G/DL
LYMPHOCYTES # BLD AUTO: 1.2 K/UL
LYMPHOCYTES NFR BLD: 19 %
MCH RBC QN AUTO: 30.6 PG
MCHC RBC AUTO-ENTMCNC: 32.9 G/DL
MCV RBC AUTO: 93 FL
MONOCYTES # BLD AUTO: 0.8 K/UL
MONOCYTES NFR BLD: 12 %
NEUTROPHILS # BLD AUTO: 4.3 K/UL
NEUTROPHILS NFR BLD: 66.6 %
PLATELET # BLD AUTO: 196 K/UL
PMV BLD AUTO: 9.9 FL
POTASSIUM SERPL-SCNC: 3.5 MMOL/L
PROT SERPL-MCNC: 6.5 G/DL
RBC # BLD AUTO: 3.92 M/UL
SODIUM SERPL-SCNC: 139 MMOL/L
WBC # BLD AUTO: 6.48 K/UL

## 2018-01-19 PROCEDURE — 25000003 PHARM REV CODE 250: Performed by: STUDENT IN AN ORGANIZED HEALTH CARE EDUCATION/TRAINING PROGRAM

## 2018-01-19 PROCEDURE — G0378 HOSPITAL OBSERVATION PER HR: HCPCS

## 2018-01-19 PROCEDURE — 85025 COMPLETE CBC W/AUTO DIFF WBC: CPT

## 2018-01-19 PROCEDURE — 97110 THERAPEUTIC EXERCISES: CPT

## 2018-01-19 PROCEDURE — 94761 N-INVAS EAR/PLS OXIMETRY MLT: CPT

## 2018-01-19 PROCEDURE — 97530 THERAPEUTIC ACTIVITIES: CPT

## 2018-01-19 PROCEDURE — 80053 COMPREHEN METABOLIC PANEL: CPT

## 2018-01-19 PROCEDURE — S5010 5% DEXTROSE AND 0.45% SALINE: HCPCS | Performed by: STUDENT IN AN ORGANIZED HEALTH CARE EDUCATION/TRAINING PROGRAM

## 2018-01-19 PROCEDURE — 36415 COLL VENOUS BLD VENIPUNCTURE: CPT

## 2018-01-19 RX ORDER — DEXTROSE MONOHYDRATE AND SODIUM CHLORIDE 5; .45 G/100ML; G/100ML
INJECTION, SOLUTION INTRAVENOUS CONTINUOUS
Status: DISCONTINUED | OUTPATIENT
Start: 2018-01-19 | End: 2018-01-21

## 2018-01-19 RX ORDER — TAMSULOSIN HYDROCHLORIDE 0.4 MG/1
0.4 CAPSULE ORAL ONCE
Status: COMPLETED | OUTPATIENT
Start: 2018-01-19 | End: 2018-01-19

## 2018-01-19 RX ADMIN — DEXTROSE AND SODIUM CHLORIDE: 5; .45 INJECTION, SOLUTION INTRAVENOUS at 10:01

## 2018-01-19 RX ADMIN — AMLODIPINE BESYLATE 10 MG: 5 TABLET ORAL at 08:01

## 2018-01-19 RX ADMIN — ACETAMINOPHEN 650 MG: 325 TABLET ORAL at 08:01

## 2018-01-19 RX ADMIN — ASPIRIN 81 MG 81 MG: 81 TABLET ORAL at 08:01

## 2018-01-19 RX ADMIN — ACETAMINOPHEN 650 MG: 325 TABLET ORAL at 07:01

## 2018-01-19 RX ADMIN — FAMOTIDINE 20 MG: 20 TABLET ORAL at 08:01

## 2018-01-19 RX ADMIN — RAMELTEON 8 MG: 8 TABLET, FILM COATED ORAL at 07:01

## 2018-01-19 RX ADMIN — HYDROCODONE BITARTRATE AND ACETAMINOPHEN 1 TABLET: 5; 325 TABLET ORAL at 09:01

## 2018-01-19 RX ADMIN — TAMSULOSIN HYDROCHLORIDE 0.4 MG: 0.4 CAPSULE ORAL at 09:01

## 2018-01-19 RX ADMIN — PRAVASTATIN SODIUM 10 MG: 10 TABLET ORAL at 08:01

## 2018-01-19 RX ADMIN — FLUTICASONE PROPIONATE 100 MCG: 50 SPRAY, METERED NASAL at 08:01

## 2018-01-19 RX ADMIN — DEXTROSE AND SODIUM CHLORIDE: 5; .45 INJECTION, SOLUTION INTRAVENOUS at 09:01

## 2018-01-19 RX ADMIN — HYDROCODONE BITARTRATE AND ACETAMINOPHEN 1 TABLET: 5; 325 TABLET ORAL at 10:01

## 2018-01-19 NOTE — PLAN OF CARE
Problem: Physical Therapy Goal  Goal: Physical Therapy Goal  Goals to be met by: 2018     Patient will increase functional independence with mobility by performin. Supine to sit with Stand-by Assistance  2. Sit to stand transfer with Stand-by Assistance  3. Bed to chair transfer with Contact Guard Assistance using Rolling Walker with platform  4. Gait  x 25 feet with Contact Guard Assistance using Rolling Walker with platform.      Outcome: Ongoing (interventions implemented as appropriate)  Treatment limited by pain patient willing to participate but limited   IPR recommended

## 2018-01-19 NOTE — PROGRESS NOTES
Dr. Brito from [facility] notified about bladder scan. MD states to hold off longer on another straight cath but if she starts feeling uncomfortable or the need to void we need to obtain another bladder scan. Will hand off to day team RN.

## 2018-01-19 NOTE — PROGRESS NOTES
TN spoke with Son, son would like TN to assist with  DME needs. Orders have been sent to [facility].     TN spoke with Andrew with [facility] regarding RW with platform, BSC, TTB, and WC for home use. Orders have been received and processed. Patient will have out of pockets costs totaling $320.32.  Andrew will contact son,  Gonzalez @ [phone] for scheduling of DME and payment.  WC will be only item, covered by patient's insurance at this time.      TN also sent facesheet to [facility] for possible HH need.   TN to follow up over weekend/ Monday      Son called and updated of above.

## 2018-01-19 NOTE — PLAN OF CARE
TN met with patient at bedside and discussed plan of care  with son, Dr. Linda Gonzalez.  Patient states she has a  at home, with recent brain surgery. Daughter is at home helping father. Senior Resource guide given to patient. Son inquired on caregiver assistance. Son not in local area, updated with home instead caregiver info [phone].        Future Appointments  Date Time Provider Department Center   1/22/2018 8:40 AM Esau Butler MD [facility]     Patient will need DME for home use. Prior DME was given away.    Orders entered and faxed to [facility] @ [phone]. Son to be called in am with pricing and make payment via telephone.      Son request ambulance quote- per Desiree ambulance quote from hospital to home transport is $228.38.   TN informed by Desiree , patient's insurance will not cover ambulance transport if patient is able to sit up. Inability to transfer is not qualifying criteria for ambulance.      Son called and updated of above.          01/18/18 6407   Discharge Assessment   Assessment Type Discharge Planning Assessment   Confirmed/corrected address and phone number on facesheet? Yes   Assessment information obtained from? Patient;Caregiver  (son Dr. Linda Gonzalez )   Communicated expected length of stay with patient/caregiver yes   Prior to hospitilization cognitive status: Alert/Oriented   Prior to hospitalization functional status: Independent   Current cognitive status: Alert/Oriented   Current Functional Status: Assistive Equipment;Needs Assistance   Lives With spouse;child(adela), adult   Able to Return to Prior Arrangements (PT recc Inpatient rehab, patient would like to go home)   Is patient able to care for self after discharge? Yes   Who are your caregiver(s) and their phone number(s)? daughter is in town    Patient's perception of discharge disposition home or selfcare;home health   Readmission Within The Last 30 Days no previous admission in last 30 days    Patient currently being followed by outpatient case management? No   Patient currently receives any other outside agency services? No   Equipment Currently Used at Home none   Do you have any problems affording any of your prescribed medications? No   Is the patient taking medications as prescribed? yes   Does the patient have transportation home? No  (son concerned patient will not be able to go up stairs to home. )   Transportation Available ambulance;family or friend will provide   Does the patient receive services at the [facility]? No   Discharge Plan A Home with family;Home Health   Discharge Plan B Rehab;Skilled Nursing Facility   Patient/Family In Agreement With Plan yes

## 2018-01-19 NOTE — PT/OT/SLP PROGRESS
Occupational Therapy   Treatment    Name: Roberto Gonzalez  MRN: [id number]  Admitting Diagnosis:  Trauma       Recommendations:     Discharge Recommendations: rehabilitation facility  Discharge Equipment Recommendations:  bedside commode, shower chair, walker, rolling  Barriers to discharge:  Inaccessible home environment, Decreased caregiver support    Subjective     Communicated with: nsg prior to session.  Pain/Comfort:  · Pain Rating 1:  (reports pain not tolerable with movement )  · Location 1:  (RUE; RLE/groin/hip )  · Pain Addressed 1: Pre-medicate for activity, Reposition, Distraction, Nurse notified, Cessation of Activity    Objective:     Patient found with:      General Precautions: Standard, fall   Orthopedic Precautions:RUE non weight bearing, RLE weight bearing as tolerated (RUE WB through elbow with platform RW )   Braces: N/A     Occupational Performance:    Bed Mobility:    · Patient completed Scooting/Bridging with maximal assistance  · Patient completed Supine to Sit with maximal assistance  · Patient completed Sit to Supine with maximal assistance     Functional Mobility/Transfers:  · Patient completed Sit <> Stand Transfer with maximal assistance  with  platform walker     Activities of Daily Living:  · UB Dressing: maximal assistance    · LB Dressing: total assistance      Patient left supine with all lines intact, call button in reach, bed alarm on and nsg notified    [facility] 6 Click:  [facility] Total Score: 13    Treatment & Education:  Pt participating in axs listed above; remains with pain and dizziness with seated EOB. BPs taken and dropping after coming to sit EOB and standing. Pt unable to tolerate standing trials ~30 sec prior to requiring seated rest breaks over 2 trials. Difficulty coming to upright  platform RW. Pt unable to take steps to HOB. Returned to supine 2/2 dizziness and pain. Educated on impt of ROM/therex to LUE as well as movement of R digits and  shoulder  Education:    Assessment:     Roberto Gonzalez is a 72 y.o. female with a medical diagnosis of Trauma..  Performance deficits affecting function are weakness, gait instability, impaired balance, impaired endurance, decreased lower extremity function, decreased upper extremity function, impaired self care skills, impaired functional mobilty, edema, pain, impaired skin, orthopedic precautions, decreased safety awareness.  Pt remains with significant pain with movement limiting tolerance for axs. Pt will require higher level of therapy at d/c. Will cont to recommend IP rehab at d/c     Rehab Prognosis:  good; patient would benefit from acute skilled OT services to address these deficits and reach maximum level of function.       Plan:     Patient to be seen 5 x/week to address the above listed problems via self-care/home management, therapeutic activities, therapeutic exercises  · Plan of Care Expires: 02/18/18  · Plan of Care Reviewed with: patient    This Plan of care has been discussed with the patient who was involved in its development and understands and is in agreement with the identified goals and treatment plan    GOALS:    Occupational Therapy Goals        Problem: Occupational Therapy Goal    Goal Priority Disciplines Outcome Interventions   Occupational Therapy Goal     OT, PT/OT Ongoing (interventions implemented as appropriate)    Description:  Goals to be met by: 2/18/18     Patient will increase functional independence with ADLs by performing:    LE Dressing with Minimal Assistance.  Grooming while standing with Contact Guard Assistance.  Toileting from bedside commode with Minimal Assistance for hygiene and clothing management.   Supine to sit with Minimal Assistance.  Stand pivot transfers with Minimal Assistance.  Toilet transfer to bedside commode with Minimal Assistance.  Increased functional strength to WFL for self care skills and functional mobility.  Upper extremity exercise program x10  reps per handout, with independence.                      Time Tracking:     OT Date of Treatment: 01/19/18  OT Start Time: 0915  OT Stop Time: 0938  OT Total Time (min): 23 min    Billable Minutes:Therapeutic Activity 10 co treatment with PT     Delores Watson, OT  1/19/2018

## 2018-01-19 NOTE — PLAN OF CARE
Problem: Patient Care Overview  Goal: Plan of Care Review  Outcome: Ongoing (interventions implemented as appropriate)  Pt AAOx4, no family members at bedside throughout shift. Pt complains of pain when moving but denies n/v/d and SOB. Regular diet tolerated well. Straight cath preformed at 2200 with 1575 of clear yellow urine output. Splint to right arm CDI. Cardiac monitoring continued. Safety maintained, bed alarm on - will cont to monitor.

## 2018-01-19 NOTE — SUBJECTIVE & OBJECTIVE
Interval History: The patient is doing well. Has BS and flatulance but has not have the urge to pee yet. Still some pain on her wrist but tolerable, no other complaints.    Review of Systems   Constitutional: Negative for activity change, appetite change, chills, diaphoresis, fatigue and fever.   HENT: Negative for congestion, hearing loss, sinus pain, sinus pressure and sneezing.    Eyes: Negative for visual disturbance.   Respiratory: Negative for apnea, cough, chest tightness, shortness of breath and wheezing.    Cardiovascular: Negative for chest pain, palpitations and leg swelling.   Gastrointestinal: Negative for abdominal distention, abdominal pain, anal bleeding, blood in stool, constipation, diarrhea, nausea and vomiting.   Endocrine: Negative for polyuria.   Genitourinary: Positive for difficulty urinating. Negative for dysuria and hematuria.   Musculoskeletal: Positive for arthralgias. Negative for back pain, gait problem and joint swelling.     Objective:     Vital Signs (Most Recent):  Temp: 98.9 °F (37.2 °C) (01/19/18 0751)  Pulse: 74 (01/19/18 0751)  Resp: 16 (01/19/18 0751)  BP: 128/60 (01/19/18 0751)  SpO2: 97 % (01/19/18 0713) Vital Signs (24h Range):  Temp:  [97.5 °F (36.4 °C)-98.9 °F (37.2 °C)] 98.9 °F (37.2 °C)  Pulse:  [69-93] 74  Resp:  [16-20] 16  SpO2:  [93 %-97 %] 97 %  BP: (103-146)/(57-65) 128/60     Weight: 69.2 kg (152 lb 8.9 oz)  Body mass index is 25.39 kg/m².    Intake/Output Summary (Last 24 hours) at 01/19/18 0900  Last data filed at 01/18/18 2156   Gross per 24 hour   Intake                0 ml   Output             1575 ml   Net            -1575 ml      Physical Exam   Constitutional: She is oriented to person, place, and time. She appears well-developed and well-nourished.   HENT:   Head: Normocephalic and atraumatic.   Nose: Nose normal.   Eyes: Conjunctivae and EOM are normal. Right eye exhibits no discharge. Left eye exhibits no discharge. No scleral icterus.   Neck: Normal  range of motion. Neck supple. No JVD present.   Cardiovascular: Normal rate, regular rhythm, normal heart sounds and intact distal pulses.  Exam reveals no gallop and no friction rub.    No murmur heard.  Pulmonary/Chest: Effort normal and breath sounds normal. No respiratory distress. She has no wheezes. She has no rales. She exhibits no tenderness.   Abdominal: Soft. Bowel sounds are normal. She exhibits no distension and no mass. There is no tenderness. There is no rebound and no guarding.   Musculoskeletal: Normal range of motion. She exhibits no edema, tenderness or deformity.   R arm is wrapped. She said at rest there's no pain, but there's some pain when she moves. She can tolerate the pain well.   Neurological: She is alert and oriented to person, place, and time.   Skin: Skin is warm. No rash noted. She is not diaphoretic. No erythema. No pallor.   Psychiatric: She has a normal mood and affect. Her behavior is normal. Judgment and thought content normal.   Nursing note and vitals reviewed.      Significant Labs:   Recent Labs      01/17/18 1932   WBC  12.70   HGB  14.4   HCT  42.6   PLT  236   MCV  93   RDW  12.5       Recent Labs      01/17/18 1932   NA  140   K  3.7   CL  101   CO2  28   GLU  115*   BUN  10   CREATININE  0.9   CALCIUM  10.7*   PROT  7.6   ALBUMIN  4.0   BILITOT  0.6   ALKPHOS  83   AST  28   ALT  25   ANIONGAP  11   ESTGFRAFRICA  >60   EGFRNONAA  >60       No results for input(s): MG, PHOS in the last 72 hours.    A1c: No results found for: HGBA1C, Last Gluc: No results for input(s): POCTGLUCOSE in the last 168 hours.    TSH:   Lab Results   Component Value Date    TSH 2.868 04/25/2017       Cardiac Enzymes  [unfilled]    [facility]    Recent Labs  Lab 01/17/18  1932   INR 1.0       UA    Recent Labs  Lab 01/18/18 2136   COLORU Yellow   SPECGRAV 1.010   PHUR 8.0   PROTEINUA Negative       Micro  Microbiology Results (last 7 days)     ** No results found for the last 168 hours. **            ABG  No results for input(s): PH, PCO2, PO2, HCO3, POCSATURATED, BE in the last 168 hours.              Significant Imaging:   Imaging Results          X-Ray Wrist 2 View Right (Final result)  Result time 01/17/18 21:41:00    Final result by Bentley Byrd MD (01/17/18 21:41:00)                 Impression:        As above.      Electronically signed by: BENTLEY BYRD MD, MD  Date:     01/17/18  Time:    21:41              Narrative:    COMPARISON: Right wrist series earlier same day    FINDINGS: 2 views right wrist.      Status post interval additional closed reduction of previously noted distal radial and ulnar styloid fractures, now with near anatomic positioning and alignment. No new displaced fracture definitively seen allowing for overlying cast material.                             CT Pelvis Without Contrast (Final result)  Result time 01/17/18 20:53:23    Final result by Eldon Mehta MD (01/17/18 20:53:23)                 Impression:      Acute right superior and inferior pubic rami fractures.  Intra-articular extension of superior pubic ramus fracture which involves the inferomedial wall of the acetabulum.      Electronically signed by: ELDON MEHTA MD  Date:     01/17/18  Time:    20:53              Narrative:    Clinical indication: 72 year old female with pelvic fracture.    Comparison: Right hip and pelvic radiographs from the same date.    Technique: 2.5 mm axial images with sagittal and coronal reformats were obtained through the pelvis without the use of IV contrast.    Findings:  There is acute fracture involving the lateral aspect of the right superior pubic ramus.  There is intra-articular extension of the fracture plane which involves the inferomedial acetabular wall.  Additional acute displaced fracture is seen involving the right inferior pubic ramus.  No additional fractures are seen.  No evidence of proximal femoral fractures.  No evidence of hip dislocation.    Partially visualized  intra-abdominal and intrapelvic contents show no acute abnormalities.  Urinary bladder is significantly distended.                             X-Ray Wrist 2 View Right (Final result)  Result time 01/17/18 20:32:26    Final result by Madeline Mehta MD (01/17/18 20:32:26)                 Impression:      As above.      Electronically signed by: MADELINE MEHTA MD  Date:     01/17/18  Time:    20:32              Narrative:    Right wrist 2 views.  Comparison: 16:19.    Examination limited by overlying casting material.  There is redemonstration of acute distal radius fracture with persistent 4 mm lateral displacement and mild volar angulation.  Fracture of the ulnar styloid tip is not well appreciated secondary to overlying artifact.                             X-Ray Wrist Complete Right (Final result)  Result time 01/17/18 16:36:18    Final result by Teofilo Calderón MD (01/17/18 16:36:18)                 Impression:     Acute closed, Comminuted impacted fracture distal radius, fracture ulnar styloid process and      Electronically signed by: GONZALO CADLERÓN MD  Date:     01/17/18  Time:    16:36              Narrative:    3 views right wrist.  Comminuted impacted fracture distal radial shaft metaphysis and epiphysis, fracture extends into the radiocarpal joint, fracture ulnar styloid process, adjacent soft tissue swelling.  No dislocation.  Mild lateral dorsal angulation distal radial fracture fragment.                             X-Ray Pelvis Routine AP (Final result)  Result time 01/17/18 16:36:33    Final result by Timmy Roper MD (01/17/18 16:36:33)                 Impression:        Superior and inferior pubic rami fractures on the right.  The superior pubic rami fracture may involve the acetabulum.  Further evaluation can be obtained with CT of the pelvis.      Electronically signed by: TIMMY ROPER MD  Date:     01/17/18  Time:    16:36              Narrative:    Comparison: None    Technique:  AP pelvis.    Findings: There are superior and inferior pubic rami fractures on the right.  The superior pubic rami fracture may involve the acetabulum.  There is no evidence of hip dislocation or definite other displaced fractures.  Soft tissue structures demonstrate no acute abnormality.                             X-Ray Hip 2 View Right (Final result)  Result time 01/17/18 16:39:49    Final result by Bentley Byrd MD (01/17/18 16:39:49)                 Impression:        Acute fractures of the right pelvis, as above.      Electronically signed by: BENTLEY BYRD MD, MD  Date:     01/17/18  Time:    16:39              Narrative:    COMPARISON: Pelvic radiograph same day    FINDINGS: 2 views right hip.        Generalized osteopenia. There are acute fractures with displacement involving the medial aspect of the right iliac body with deformity of the iliopectineal line and also left inferior pubic ramus with deformity of the obturator ring.  There is also questionable disruption of the more inferior aspect of the anterior wall of the right acetabulum. The right ileo-ischial line and ischial teardrop appear grossly intact.    Imaged proximal right femur and remainder of the pelvis otherwise appear intact. No dislocation or destructive osseous process identified.  Mild degenerative change.   No subcutaneous emphysema or radiodense retained foreign body.

## 2018-01-19 NOTE — PROGRESS NOTES
Patient feels she is not progressing well and would now like TN to assist with rehab placement. TN informed patient Rehab will need to be approved and if denied SNF services may be approved.Choices given and referral sent via [facility]   Patient and son updated on patient's observation status.

## 2018-01-19 NOTE — PROGRESS NOTES
Per patient's preference for inpatient rehab referral faxed to [facility], [facility], and [facility] via [facility].     made phone contact with [facility] admit coordinator, Shonda regarding need for rehab.  Shonda reported no available bed today.       made phone with [facility] admit coordinator, Mino and informed him regarding need for inpatient rehab.  Mino said he will review referral and follow up with .     attempted to make phone contact with [facility] admit coordinator, Zoey Dumont to informed her about the need  For rehab.  Zoey was not available, left voice message.     attempted to make phone contact with [employer] , Linda Hathaway regarding need for inpatient rehab.  Linda was not available left voice message.

## 2018-01-19 NOTE — PLAN OF CARE
Problem: Occupational Therapy Goal  Goal: Occupational Therapy Goal  Goals to be met by: 2/18/18     Patient will increase functional independence with ADLs by performing:    LE Dressing with Minimal Assistance.  Grooming while standing with Contact Guard Assistance.  Toileting from bedside commode with Minimal Assistance for hygiene and clothing management.   Supine to sit with Minimal Assistance.  Stand pivot transfers with Minimal Assistance.  Toilet transfer to bedside commode with Minimal Assistance.  Increased functional strength to WFL for self care skills and functional mobility.  Upper extremity exercise program x10 reps per handout, with independence.     Outcome: Ongoing (interventions implemented as appropriate)  Pt remains with significant pain with movement limiting tolerance for axs. Pt will require higher level of therapy at d/c. Will cont to recommend IP rehab at d/c

## 2018-01-19 NOTE — PROGRESS NOTES
[facility] Hospital Medicine  Progress Note    Patient Name: Roberto Gonzalez  MRN: [id number]  Patient Class: OP- Observation   Admission Date: 1/17/2018  Length of Stay: 0 days  Attending Physician: Uriel Samson MD  Primary Care Provider: Esau Butler MD        Subjective:     Principal Problem:Trauma    HPI:  72F with pmx of HTN and HLD. She slipped and fell on ice yesterday and landed on her right arm, was found to have fracture on her wrist.         Interval History: The patient is doing well. Has BS and flatulance but has not have the urge to pee yet. Still some pain on her wrist but tolerable, no other complaints.    Review of Systems   Constitutional: Negative for activity change, appetite change, chills, diaphoresis, fatigue and fever.   HENT: Negative for congestion, hearing loss, sinus pain, sinus pressure and sneezing.    Eyes: Negative for visual disturbance.   Respiratory: Negative for apnea, cough, chest tightness, shortness of breath and wheezing.    Cardiovascular: Negative for chest pain, palpitations and leg swelling.   Gastrointestinal: Negative for abdominal distention, abdominal pain, anal bleeding, blood in stool, constipation, diarrhea, nausea and vomiting.   Endocrine: Negative for polyuria.   Genitourinary: Positive for difficulty urinating. Negative for dysuria and hematuria.   Musculoskeletal: Positive for arthralgias. Negative for back pain, gait problem and joint swelling.     Objective:     Vital Signs (Most Recent):  Temp: 98.9 °F (37.2 °C) (01/19/18 0751)  Pulse: 74 (01/19/18 0751)  Resp: 16 (01/19/18 0751)  BP: 128/60 (01/19/18 0751)  SpO2: 97 % (01/19/18 0713) Vital Signs (24h Range):  Temp:  [97.5 °F (36.4 °C)-98.9 °F (37.2 °C)] 98.9 °F (37.2 °C)  Pulse:  [69-93] 74  Resp:  [16-20] 16  SpO2:  [93 %-97 %] 97 %  BP: (103-146)/(57-65) 128/60     Weight: 69.2 kg (152 lb 8.9 oz)  Body mass index is 25.39 kg/m².    Intake/Output Summary (Last 24 hours) at 01/19/18  0900  Last data filed at 01/18/18 2156   Gross per 24 hour   Intake                0 ml   Output             1575 ml   Net            -1575 ml      Physical Exam   Constitutional: She is oriented to person, place, and time. She appears well-developed and well-nourished.   HENT:   Head: Normocephalic and atraumatic.   Nose: Nose normal.   Eyes: Conjunctivae and EOM are normal. Right eye exhibits no discharge. Left eye exhibits no discharge. No scleral icterus.   Neck: Normal range of motion. Neck supple. No JVD present.   Cardiovascular: Normal rate, regular rhythm, normal heart sounds and intact distal pulses.  Exam reveals no gallop and no friction rub.    No murmur heard.  Pulmonary/Chest: Effort normal and breath sounds normal. No respiratory distress. She has no wheezes. She has no rales. She exhibits no tenderness.   Abdominal: Soft. Bowel sounds are normal. She exhibits no distension and no mass. There is no tenderness. There is no rebound and no guarding.   Musculoskeletal: Normal range of motion. She exhibits no edema, tenderness or deformity.   R arm is wrapped. She said at rest there's no pain, but there's some pain when she moves. She can tolerate the pain well.   Neurological: She is alert and oriented to person, place, and time.   Skin: Skin is warm. No rash noted. She is not diaphoretic. No erythema. No pallor.   Psychiatric: She has a normal mood and affect. Her behavior is normal. Judgment and thought content normal.   Nursing note and vitals reviewed.      Significant Labs:   Recent Labs      01/17/18 1932   WBC  12.70   HGB  14.4   HCT  42.6   PLT  236   MCV  93   RDW  12.5       Recent Labs      01/17/18 1932   NA  140   K  3.7   CL  101   CO2  28   GLU  115*   BUN  10   CREATININE  0.9   CALCIUM  10.7*   PROT  7.6   ALBUMIN  4.0   BILITOT  0.6   ALKPHOS  83   AST  28   ALT  25   ANIONGAP  11   ESTGFRAFRICA  >60   EGFRNONAA  >60       No results for input(s): MG, PHOS in the last 72  hours.    A1c: No results found for: HGBA1C, Last Gluc: No results for input(s): POCTGLUCOSE in the last 168 hours.    TSH:   Lab Results   Component Value Date    TSH 2.868 04/25/2017       Cardiac Enzymes  [unfilled]    [facility]    Recent Labs  Lab 01/17/18  1932   INR 1.0       UA    Recent Labs  Lab 01/18/18  2136   COLORU Yellow   SPECGRAV 1.010   PHUR 8.0   PROTEINUA Negative       Micro  Microbiology Results (last 7 days)     ** No results found for the last 168 hours. **           ABG  No results for input(s): PH, PCO2, PO2, HCO3, POCSATURATED, BE in the last 168 hours.              Significant Imaging:   Imaging Results          X-Ray Wrist 2 View Right (Final result)  Result time 01/17/18 21:41:00    Final result by Bentley Byrd MD (01/17/18 21:41:00)                 Impression:        As above.      Electronically signed by: BENTLEY BYRD MD, MD  Date:     01/17/18  Time:    21:41              Narrative:    COMPARISON: Right wrist series earlier same day    FINDINGS: 2 views right wrist.      Status post interval additional closed reduction of previously noted distal radial and ulnar styloid fractures, now with near anatomic positioning and alignment. No new displaced fracture definitively seen allowing for overlying cast material.                             CT Pelvis Without Contrast (Final result)  Result time 01/17/18 20:53:23    Final result by Eldon Mehta MD (01/17/18 20:53:23)                 Impression:      Acute right superior and inferior pubic rami fractures.  Intra-articular extension of superior pubic ramus fracture which involves the inferomedial wall of the acetabulum.      Electronically signed by: ELDON MEHTA MD  Date:     01/17/18  Time:    20:53              Narrative:    Clinical indication: 72 year old female with pelvic fracture.    Comparison: Right hip and pelvic radiographs from the same date.    Technique: 2.5 mm axial images with sagittal and coronal reformats were obtained  through the pelvis without the use of IV contrast.    Findings:  There is acute fracture involving the lateral aspect of the right superior pubic ramus.  There is intra-articular extension of the fracture plane which involves the inferomedial acetabular wall.  Additional acute displaced fracture is seen involving the right inferior pubic ramus.  No additional fractures are seen.  No evidence of proximal femoral fractures.  No evidence of hip dislocation.    Partially visualized intra-abdominal and intrapelvic contents show no acute abnormalities.  Urinary bladder is significantly distended.                             X-Ray Wrist 2 View Right (Final result)  Result time 01/17/18 20:32:26    Final result by Eldon Mehta MD (01/17/18 20:32:26)                 Impression:      As above.      Electronically signed by: ELDON MEHTA MD  Date:     01/17/18  Time:    20:32              Narrative:    Right wrist 2 views.  Comparison: 16:19.    Examination limited by overlying casting material.  There is redemonstration of acute distal radius fracture with persistent 4 mm lateral displacement and mild volar angulation.  Fracture of the ulnar styloid tip is not well appreciated secondary to overlying artifact.                             X-Ray Wrist Complete Right (Final result)  Result time 01/17/18 16:36:18    Final result by Teofilo Calderón MD (01/17/18 16:36:18)                 Impression:     Acute closed, Comminuted impacted fracture distal radius, fracture ulnar styloid process and      Electronically signed by: GONZALO CALDERÓN MD  Date:     01/17/18  Time:    16:36              Narrative:    3 views right wrist.  Comminuted impacted fracture distal radial shaft metaphysis and epiphysis, fracture extends into the radiocarpal joint, fracture ulnar styloid process, adjacent soft tissue swelling.  No dislocation.  Mild lateral dorsal angulation distal radial fracture fragment.                              X-Ray Pelvis Routine AP (Final result)  Result time 01/17/18 16:36:33    Final result by Timmy Husain MD (01/17/18 16:36:33)                 Impression:        Superior and inferior pubic rami fractures on the right.  The superior pubic rami fracture may involve the acetabulum.  Further evaluation can be obtained with CT of the pelvis.      Electronically signed by: TIMMY HUSAIN MD  Date:     01/17/18  Time:    16:36              Narrative:    Comparison: None    Technique: AP pelvis.    Findings: There are superior and inferior pubic rami fractures on the right.  The superior pubic rami fracture may involve the acetabulum.  There is no evidence of hip dislocation or definite other displaced fractures.  Soft tissue structures demonstrate no acute abnormality.                             X-Ray Hip 2 View Right (Final result)  Result time 01/17/18 16:39:49    Final result by Bentley Byrd MD (01/17/18 16:39:49)                 Impression:        Acute fractures of the right pelvis, as above.      Electronically signed by: BENTLEY BYRD MD, MD  Date:     01/17/18  Time:    16:39              Narrative:    COMPARISON: Pelvic radiograph same day    FINDINGS: 2 views right hip.        Generalized osteopenia. There are acute fractures with displacement involving the medial aspect of the right iliac body with deformity of the iliopectineal line and also left inferior pubic ramus with deformity of the obturator ring.  There is also questionable disruption of the more inferior aspect of the anterior wall of the right acetabulum. The right ileo-ischial line and ischial teardrop appear grossly intact.    Imaged proximal right femur and remainder of the pelvis otherwise appear intact. No dislocation or destructive osseous process identified.  Mild degenerative change.   No subcutaneous emphysema or radiodense retained foreign body.                                Assessment/Plan:      * Trauma    S/p fall landed on right  arm          GERD (gastroesophageal reflux disease)    Famotidine 20mg BID          Closed fracture of right wrist    Closed fixation done by ortho  Pain and procedure tolerated well  PT/OT on board      Benign essential HTN    Patient was taking half a tablet a day but was not able to take it in the past week because her  is also in the hospital  Started home regimen.   BP stable 140s        Hyperlipidemia    Started home statin          Urinary retention  Made 300 over night, will start IVF, pt tolerates PO  In/out cath for now  Start tamsulosin to help relax urethra muscle  Will f/u        VTE Risk Mitigation         Ordered     Place ZAID hose  Until discontinued      01/17/18 2240     Medium Risk of VTE  Once      01/17/18 2240     Place sequential compression device  Until discontinued      01/17/18 2240        Dispo: Am labs ordered will f/u, ordered d5 1/2NS runs gently, continue in/out cath, bladder scan and post-residual for now      Queta Brar MD  Department of Hospital Medicine   [facility]

## 2018-01-19 NOTE — PROGRESS NOTES
Pt hasn't urinated on her own since straight cath at 2200. Bladder scan showed 296ccs. Pt denies feeling the need to urinate. Notified Dr. Vazquez, orders to wait longer for pt to void on her own. Will notify primary team of pt's inability to void. NAD noted, VSS. Will cont to monitor.

## 2018-01-19 NOTE — PT/OT/SLP PROGRESS
Physical Therapy Treatment    Patient Name:  Roberto Gonzalez   MRN:  [id number]    Recommendations:     Discharge Recommendations:  rehabilitation facility   Discharge Equipment Recommendations:  (TBD by facility)   Barriers to discharge: Decreased caregiver support    Assessment:     Roberto Gonzalez is a 72 y.o. female admitted with a medical diagnosis of Trauma.  She presents with the following impairments/functional limitations:  weakness, gait instability, pain, impaired self care skills, impaired functional mobilty, impaired balance, decreased upper extremity function, decreased lower extremity function, decreased ROM Patient eager to participate with PT however limited by pain with transitional movement and wt bearing with standing.    Rehab Prognosis:  Good; patient would benefit from acute skilled PT services to address these deficits and reach maximum level of function.      Recent Surgery: * No surgery found *      Plan:     During this hospitalization, patient to be seen BID to address the above listed problems via gait training, therapeutic activities, therapeutic exercises  · Plan of Care Expires:      Plan of Care Reviewed with: patient    Subjective     Communicated with primary nurse prior to session.  Patient found supine HOB raised upon PT entry to room, agreeable to treatment.      Chief Complaint: pain  Patient comments/goals: go home  Pain/Comfort:  · Pain Rating 1:  (did not rate pain on scale)  · Location - Side 1: Right  · Location - Orientation 1: generalized  · Location 1: hip  · Pain Addressed 1: Pre-medicate for activity, Reposition    Patients cultural, spiritual, Ep[employer] conflicts given the current situation:      Objective:     Patient found with: telemetry     General Precautions: Standard, fall   Orthopedic Precautions:RLE weight bearing as tolerated, RUE non weight bearing   Braces: N/A     Functional Mobility:  · Bed Mobility:     · Supine to Sit: maximal assistance  · Sit to Supine:  maximal assistance  · Transfers:     · Sit to Stand:  maximal assistance with rolling walker  · Balance: poor with RW      AM-PAC 6 CLICK MOBILITY  Turning over in bed (including adjusting bedclothes, sheets and blankets)?: 3  Sitting down on and standing up from a chair with arms (e.g., wheelchair, bedside commode, etc.): 2  Moving from lying on back to sitting on the side of the bed?: 2  Moving to and from a bed to a chair (including a wheelchair)?: 2  Need to walk in hospital room?: 2  Climbing 3-5 steps with a railing?: 1  Total Score: 12       Therapeutic Activities and Exercises:   Reviewed ankle pumps QS,gluteal sets and AAROM ex    Patient left HOB elevated with all lines intact and call button in reach..    GOALS:    Physical Therapy Goals        Problem: Physical Therapy Goal    Goal Priority Disciplines Outcome Goal Variances Interventions   Physical Therapy Goal     PT/OT, PT Ongoing (interventions implemented as appropriate)     Description:  Goals to be met by: 2018     Patient will increase functional independence with mobility by performin. Supine to sit with Stand-by Assistance  2. Sit to stand transfer with Stand-by Assistance  3. Bed to chair transfer with Contact Guard Assistance using Rolling Walker with platform  4. Gait  x 25 feet with Contact Guard Assistance using Rolling Walker with platform.                       Time Tracking:     PT Received On: 18  PT Start Time: 915     PT Stop Time: 935  PT Total Time (min): 20 min     Billable Minutes: Therapeutic Exercise 10    Treatment Type: Treatment  PT/PTA: PT           Emerson Bynum, PT  2018

## 2018-01-19 NOTE — PROGRESS NOTES
[facility]    Interval:   No new issues overnight  No new complaints. Pain significant but improved. Limited with PT yesterday in regards to mobility 2/2 pain.      Vitals:  Temp:  [97.5 °F (36.4 °C)-98.9 °F (37.2 °C)] 97.9 °F (36.6 °C)  Pulse:  [69-93] 69  Resp:  [16-20] 16  SpO2:  [93 %-97 %] 93 %  BP: (103-146)/(57-65) 146/62       Scheduled Meds:    amLODIPine  10 mg Oral Daily    aspirin  81 mg Oral BID    famotidine  20 mg Oral BID    fluticasone  2 spray Each Nare Daily    pravastatin  10 mg Oral Daily      Continuous Infusions:   PRN Meds: acetaminophen, diphenhydrAMINE, hydrocodone-acetaminophen 5-325mg, ondansetron, promethazine (PHENERGAN) IVPB, ramelteon, sodium chloride 0.9%     Diet: Diet Adult Regular     Exam:  RUE  Splint in place  Mild swelling to all digits  Able to move all fingers without pain to stretch  LTS intact  Motor intact  BCR all digits     RLE  Mild pain with log roll  LTS intact   Motor intact  2+ DP        Impression:  72F with R pubic rami fx and R DR fx     Plan:  PT today - needs mobilization for DC plan  Platform WB RUE  WBAT BLE  Regular diet   DVT ppx ASA 81 BID  Pepcid    I agree with findings outlined by the resident.

## 2018-01-19 NOTE — PLAN OF CARE
Problem: Patient Care Overview  Goal: Plan of Care Review  Outcome: Ongoing (interventions implemented as appropriate)  Pt is AAOx3 with complaints of pain only with movement. Pt tried working with therapy this morning but got dizzy when sitting up and then was too tired from pain medicine given this morning to work in the afternoon. Pt had daughter at bedside for part of day and nurse spoke with son on phone.

## 2018-01-20 PROBLEM — R33.8 POSTOPERATIVE URINARY RETENTION: Status: ACTIVE | Noted: 2018-01-20

## 2018-01-20 PROBLEM — N99.89 POSTOPERATIVE URINARY RETENTION: Status: ACTIVE | Noted: 2018-01-20

## 2018-01-20 LAB
ALBUMIN SERPL BCP-MCNC: 2.9 G/DL
ALP SERPL-CCNC: 59 U/L
ALT SERPL W/O P-5'-P-CCNC: 16 U/L
ANION GAP SERPL CALC-SCNC: 6 MMOL/L
AST SERPL-CCNC: 17 U/L
BASOPHILS # BLD AUTO: 0.03 K/UL
BASOPHILS NFR BLD: 0.5 %
BILIRUB SERPL-MCNC: 0.4 MG/DL
BUN SERPL-MCNC: 11 MG/DL
CALCIUM SERPL-MCNC: 8.9 MG/DL
CHLORIDE SERPL-SCNC: 104 MMOL/L
CO2 SERPL-SCNC: 26 MMOL/L
CREAT SERPL-MCNC: 0.7 MG/DL
DIFFERENTIAL METHOD: ABNORMAL
EOSINOPHIL # BLD AUTO: 0.2 K/UL
EOSINOPHIL NFR BLD: 3 %
ERYTHROCYTE [DISTWIDTH] IN BLOOD BY AUTOMATED COUNT: 12.4 %
EST. GFR  (AFRICAN AMERICAN): >60 ML/MIN/1.73 M^2
EST. GFR  (NON AFRICAN AMERICAN): >60 ML/MIN/1.73 M^2
GLUCOSE SERPL-MCNC: 149 MG/DL
HCT VFR BLD AUTO: 34.5 %
HGB BLD-MCNC: 11.3 G/DL
LYMPHOCYTES # BLD AUTO: 1.7 K/UL
LYMPHOCYTES NFR BLD: 29.3 %
MCH RBC QN AUTO: 30.5 PG
MCHC RBC AUTO-ENTMCNC: 32.8 G/DL
MCV RBC AUTO: 93 FL
MONOCYTES # BLD AUTO: 0.6 K/UL
MONOCYTES NFR BLD: 9.8 %
NEUTROPHILS # BLD AUTO: 3.3 K/UL
NEUTROPHILS NFR BLD: 57.2 %
PLATELET # BLD AUTO: 186 K/UL
PMV BLD AUTO: 10 FL
POTASSIUM SERPL-SCNC: 3.9 MMOL/L
PROT SERPL-MCNC: 6.2 G/DL
RBC # BLD AUTO: 3.7 M/UL
SODIUM SERPL-SCNC: 136 MMOL/L
WBC # BLD AUTO: 5.73 K/UL

## 2018-01-20 PROCEDURE — 25000003 PHARM REV CODE 250: Performed by: STUDENT IN AN ORGANIZED HEALTH CARE EDUCATION/TRAINING PROGRAM

## 2018-01-20 PROCEDURE — 36415 COLL VENOUS BLD VENIPUNCTURE: CPT

## 2018-01-20 PROCEDURE — S5010 5% DEXTROSE AND 0.45% SALINE: HCPCS | Performed by: STUDENT IN AN ORGANIZED HEALTH CARE EDUCATION/TRAINING PROGRAM

## 2018-01-20 PROCEDURE — 85025 COMPLETE CBC W/AUTO DIFF WBC: CPT

## 2018-01-20 PROCEDURE — 63600175 PHARM REV CODE 636 W HCPCS: Performed by: ORTHOPAEDIC SURGERY

## 2018-01-20 PROCEDURE — 94761 N-INVAS EAR/PLS OXIMETRY MLT: CPT

## 2018-01-20 PROCEDURE — 80053 COMPREHEN METABOLIC PANEL: CPT

## 2018-01-20 PROCEDURE — G0378 HOSPITAL OBSERVATION PER HR: HCPCS

## 2018-01-20 PROCEDURE — 97530 THERAPEUTIC ACTIVITIES: CPT

## 2018-01-20 PROCEDURE — 97116 GAIT TRAINING THERAPY: CPT | Mod: 59

## 2018-01-20 PROCEDURE — 97110 THERAPEUTIC EXERCISES: CPT

## 2018-01-20 RX ORDER — ENOXAPARIN SODIUM 100 MG/ML
40 INJECTION SUBCUTANEOUS EVERY 24 HOURS
Status: DISCONTINUED | OUTPATIENT
Start: 2018-01-20 | End: 2018-01-24 | Stop reason: HOSPADM

## 2018-01-20 RX ORDER — TAMSULOSIN HYDROCHLORIDE 0.4 MG/1
0.4 CAPSULE ORAL ONCE
Status: COMPLETED | OUTPATIENT
Start: 2018-01-20 | End: 2018-01-20

## 2018-01-20 RX ADMIN — HYDROCODONE BITARTRATE AND ACETAMINOPHEN 1 TABLET: 5; 325 TABLET ORAL at 06:01

## 2018-01-20 RX ADMIN — HYDROCODONE BITARTRATE AND ACETAMINOPHEN 1 TABLET: 5; 325 TABLET ORAL at 10:01

## 2018-01-20 RX ADMIN — HYDROCODONE BITARTRATE AND ACETAMINOPHEN 1 TABLET: 5; 325 TABLET ORAL at 05:01

## 2018-01-20 RX ADMIN — AMLODIPINE BESYLATE 10 MG: 5 TABLET ORAL at 11:01

## 2018-01-20 RX ADMIN — ENOXAPARIN SODIUM 40 MG: 100 INJECTION SUBCUTANEOUS at 11:01

## 2018-01-20 RX ADMIN — TAMSULOSIN HYDROCHLORIDE 0.4 MG: 0.4 CAPSULE ORAL at 11:01

## 2018-01-20 RX ADMIN — HYDROCODONE BITARTRATE AND ACETAMINOPHEN 1 TABLET: 5; 325 TABLET ORAL at 11:01

## 2018-01-20 RX ADMIN — DEXTROSE AND SODIUM CHLORIDE: 5; .45 INJECTION, SOLUTION INTRAVENOUS at 11:01

## 2018-01-20 RX ADMIN — PRAVASTATIN SODIUM 10 MG: 10 TABLET ORAL at 11:01

## 2018-01-20 NOTE — PLAN OF CARE
Problem: Physical Therapy Goal  Goal: Physical Therapy Goal  Goals to be met by: 2018     Patient will increase functional independence with mobility by performin. Supine to sit with Stand-by Assistance  2. Sit to stand transfer with Stand-by Assistance  3. Bed to chair transfer with Contact Guard Assistance using Rolling Walker with platform  4. Gait  x 25 feet with Contact Guard Assistance using Rolling Walker with platform.      Outcome: Ongoing (interventions implemented as appropriate)  Patient supine <> sit with max assist +1  Patient sitting EOB with more comfort able to assist more with scooting   Patient sit <> stand max +1 assist able to stand ~ 2 min and able to scoot both LE in side ways sliding motion to move towards head of bed.  Sat EOB and performed LAQ,ankle pumps,and hip ADD, Unable to attempt second standing trial 2/2 pain   Improved functional mobility

## 2018-01-20 NOTE — PLAN OF CARE
Problem: Patient Care Overview  Goal: Plan of Care Review  Outcome: Ongoing (interventions implemented as appropriate)  Pt AAOx4, no family members at bedside throughout shift. Pt complains of moderate pain relieved by PRN medication. IVF infusing per MAR. Pt hadn't urinated on her own, bladder scan at 0100 showed 856ccs. Family medicine notified, orders to place edwards. Edwards placed, clear yellow urine output. Regular diet tolerated well. Splint to right arm CDI. Cardiac monitoring continued. Safety maintained, bed alarm on - will cont to monitor.

## 2018-01-20 NOTE — SUBJECTIVE & OBJECTIVE
Interval History: The patient is still in pain in her wrist and pelvis. She seems uncomfortable. She still does not have the urge to urinate yet.  Review of Systems   Constitutional: Negative for activity change, appetite change, chills, diaphoresis, fatigue and fever.   HENT: Negative for congestion, hearing loss, sinus pain, sinus pressure and sneezing.    Eyes: Negative for visual disturbance.   Respiratory: Negative for apnea, cough, chest tightness, shortness of breath and wheezing.    Cardiovascular: Negative for chest pain, palpitations and leg swelling.   Gastrointestinal: Negative for abdominal distention, abdominal pain, anal bleeding, blood in stool, constipation, diarrhea, nausea and vomiting.   Endocrine: Negative for polyuria.   Genitourinary: Positive for difficulty urinating. Negative for dysuria and hematuria.   Musculoskeletal: Positive for arthralgias. Negative for back pain, gait problem and joint swelling.     Objective:     Vital Signs (Most Recent):  Temp: 97.9 °F (36.6 °C) (01/20/18 0724)  Pulse: 69 (01/20/18 0724)  Resp: 16 (01/20/18 0724)  BP: 118/61 (01/20/18 0724)  SpO2: 95 % (01/20/18 0438) Vital Signs (24h Range):  Temp:  [97.9 °F (36.6 °C)-100.3 °F (37.9 °C)] 97.9 °F (36.6 °C)  Pulse:  [69-89] 69  Resp:  [16-18] 16  SpO2:  [95 %-98 %] 95 %  BP: (107-138)/(52-65) 118/61     Weight: 69.2 kg (152 lb 8.9 oz)  Body mass index is 25.39 kg/m².    Intake/Output Summary (Last 24 hours) at 01/20/18 0903  Last data filed at 01/20/18 0607   Gross per 24 hour   Intake          1446.25 ml   Output             2100 ml   Net          -653.75 ml      Physical Exam   Constitutional: She is oriented to person, place, and time. She appears well-developed and well-nourished.   HENT:   Head: Normocephalic and atraumatic.   Nose: Nose normal.   Eyes: Conjunctivae and EOM are normal. Right eye exhibits no discharge. Left eye exhibits no discharge. No scleral icterus.   Neck: Normal range of motion. Neck  supple. No JVD present.   Cardiovascular: Normal rate, regular rhythm, normal heart sounds and intact distal pulses.  Exam reveals no gallop and no friction rub.    No murmur heard.  Pulmonary/Chest: Effort normal and breath sounds normal. No respiratory distress. She has no wheezes. She has no rales. She exhibits no tenderness.   Abdominal: Soft. Bowel sounds are normal. She exhibits no distension and no mass. There is no tenderness. There is no rebound and no guarding.   Musculoskeletal: Normal range of motion. She exhibits no edema, tenderness or deformity.   R arm is wrapped. She said at rest there's no pain, but there's some pain when she moves. She can tolerate the pain well.   Neurological: She is alert and oriented to person, place, and time.   Skin: Skin is warm. No rash noted. She is not diaphoretic. No erythema. No pallor.   Psychiatric: She has a normal mood and affect. Her behavior is normal. Judgment and thought content normal.   Nursing note and vitals reviewed.      Significant Labs:  Recent Labs      01/17/18 1932 01/19/18   0905   WBC  12.70  6.48   HGB  14.4  12.0   HCT  42.6  36.5*   PLT  236  196   MCV  93  93   RDW  12.5  12.7       Recent Labs      01/17/18 1932 01/19/18   0905   NA  140  139   K  3.7  3.5   CL  101  104   CO2  28  29   GLU  115*  123*   BUN  10  13   CREATININE  0.9  0.8   CALCIUM  10.7*  9.1   PROT  7.6  6.5   ALBUMIN  4.0  3.2*   BILITOT  0.6  0.6   ALKPHOS  83  62   AST  28  18   ALT  25  17   ANIONGAP  11  6*   ESTGFRAFRICA  >60  >60   EGFRNONAA  >60  >60       No results for input(s): MG, PHOS in the last 72 hours.    A1c: No results found for: HGBA1C, Last Gluc: No results for input(s): POCTGLUCOSE in the last 168 hours.    TSH:   Lab Results   Component Value Date    TSH 2.868 04/25/2017       Cardiac Enzymes  [unfilled]    Coags    Recent Labs  Lab 01/17/18 1932   INR 1.0       UA  No results for input(s): COLORU, CLARITYU, SPECGRAV, PHUR, PROTEINUA, GLUCOSEU,  BLOODU, WBCU, RBCU, BACTERIA, MUCUS in the last 24 hours.    Invalid input(s):  BILIRUBINCON    Micro  Microbiology Results (last 7 days)     ** No results found for the last 168 hours. **           ABG  No results for input(s): PH, PCO2, PO2, HCO3, POCSATURATED, BE in the last 168 hours.              Significant Imaging:  Imaging Results          X-Ray Wrist 2 View Right (Final result)  Result time 01/17/18 21:41:00    Final result by Bentley Byrd MD (01/17/18 21:41:00)                 Impression:        As above.      Electronically signed by: BENTLEY BYRD MD, MD  Date:     01/17/18  Time:    21:41              Narrative:    COMPARISON: Right wrist series earlier same day    FINDINGS: 2 views right wrist.      Status post interval additional closed reduction of previously noted distal radial and ulnar styloid fractures, now with near anatomic positioning and alignment. No new displaced fracture definitively seen allowing for overlying cast material.                             CT Pelvis Without Contrast (Final result)  Result time 01/17/18 20:53:23    Final result by Eldon Mehta MD (01/17/18 20:53:23)                 Impression:      Acute right superior and inferior pubic rami fractures.  Intra-articular extension of superior pubic ramus fracture which involves the inferomedial wall of the acetabulum.      Electronically signed by: ELDON MEHTA MD  Date:     01/17/18  Time:    20:53              Narrative:    Clinical indication: 72 year old female with pelvic fracture.    Comparison: Right hip and pelvic radiographs from the same date.    Technique: 2.5 mm axial images with sagittal and coronal reformats were obtained through the pelvis without the use of IV contrast.    Findings:  There is acute fracture involving the lateral aspect of the right superior pubic ramus.  There is intra-articular extension of the fracture plane which involves the inferomedial acetabular wall.  Additional acute displaced  fracture is seen involving the right inferior pubic ramus.  No additional fractures are seen.  No evidence of proximal femoral fractures.  No evidence of hip dislocation.    Partially visualized intra-abdominal and intrapelvic contents show no acute abnormalities.  Urinary bladder is significantly distended.                             X-Ray Wrist 2 View Right (Final result)  Result time 01/17/18 20:32:26    Final result by Madeline Mehta MD (01/17/18 20:32:26)                 Impression:      As above.      Electronically signed by: MADELINE MEHTA MD  Date:     01/17/18  Time:    20:32              Narrative:    Right wrist 2 views.  Comparison: 16:19.    Examination limited by overlying casting material.  There is redemonstration of acute distal radius fracture with persistent 4 mm lateral displacement and mild volar angulation.  Fracture of the ulnar styloid tip is not well appreciated secondary to overlying artifact.                             X-Ray Wrist Complete Right (Final result)  Result time 01/17/18 16:36:18    Final result by Teofilo Calderón MD (01/17/18 16:36:18)                 Impression:     Acute closed, Comminuted impacted fracture distal radius, fracture ulnar styloid process and      Electronically signed by: GONZALO CALDERÓN MD  Date:     01/17/18  Time:    16:36              Narrative:    3 views right wrist.  Comminuted impacted fracture distal radial shaft metaphysis and epiphysis, fracture extends into the radiocarpal joint, fracture ulnar styloid process, adjacent soft tissue swelling.  No dislocation.  Mild lateral dorsal angulation distal radial fracture fragment.                             X-Ray Pelvis Routine AP (Final result)  Result time 01/17/18 16:36:33    Final result by Timmy Husain MD (01/17/18 16:36:33)                 Impression:        Superior and inferior pubic rami fractures on the right.  The superior pubic rami fracture may involve the acetabulum.  Further  evaluation can be obtained with CT of the pelvis.      Electronically signed by: DIPTI ROPER MD  Date:     01/17/18  Time:    16:36              Narrative:    Comparison: None    Technique: AP pelvis.    Findings: There are superior and inferior pubic rami fractures on the right.  The superior pubic rami fracture may involve the acetabulum.  There is no evidence of hip dislocation or definite other displaced fractures.  Soft tissue structures demonstrate no acute abnormality.                             X-Ray Hip 2 View Right (Final result)  Result time 01/17/18 16:39:49    Final result by Bentley Byrd MD (01/17/18 16:39:49)                 Impression:        Acute fractures of the right pelvis, as above.      Electronically signed by: BENTLEY BYRD MD, MD  Date:     01/17/18  Time:    16:39              Narrative:    COMPARISON: Pelvic radiograph same day    FINDINGS: 2 views right hip.        Generalized osteopenia. There are acute fractures with displacement involving the medial aspect of the right iliac body with deformity of the iliopectineal line and also left inferior pubic ramus with deformity of the obturator ring.  There is also questionable disruption of the more inferior aspect of the anterior wall of the right acetabulum. The right ileo-ischial line and ischial teardrop appear grossly intact.    Imaged proximal right femur and remainder of the pelvis otherwise appear intact. No dislocation or destructive osseous process identified.  Mild degenerative change.   No subcutaneous emphysema or radiodense retained foreign body.

## 2018-01-20 NOTE — PT/OT/SLP PROGRESS
Physical Therapy Treatment    Patient Name:  Roberto Gonzalez   MRN:  [id number]    Recommendations:     Discharge Recommendations:  rehabilitation facility   Discharge Equipment Recommendations: other (see comments) (TBD by facility)   Barriers to discharge: Decreased caregiver support    Assessment:     Roberto Gonzalez is a 72 y.o. female admitted with a medical diagnosis of Trauma.  She presents with the following impairments/functional limitations:  weakness, gait instability, impaired balance, impaired self care skills, impaired functional mobilty, pain, decreased lower extremity function, decreased ROM Patient with improved mobility and tolerance for pain this session Able to sit EOB with more comfort and stand with platform RW in erect posture.    Rehab Prognosis:  Good; patient would benefit from acute skilled PT services to address these deficits and reach maximum level of function.      Recent Surgery: * No surgery found *      Plan:     During this hospitalization, patient to be seen BID to address the above listed problems via gait training, therapeutic activities, therapeutic exercises  · Plan of Care Expires:  02/16/18   Plan of Care Reviewed with: patient    Subjective     Communicated with primary nurse  prior to session.  Patient found supine HOB raised upon PT entry to room, agreeable to treatment.      Chief Complaint: pain  Patient comments/goals: go home  Pain/Comfort:  · Pain Rating 1: other (see comments) (No value given)  · Location - Side 1: Right  · Location - Orientation 1: generalized  · Location 1: hip  · Pain Addressed 1: Pre-medicate for activity, Distraction, Reposition    Patients cultural, spiritual, U[place] conflicts given the current situation:      Objective:     Patient found with: edwards catheter, telemetry, peripheral IV     General Precautions: Standard, fall   Orthopedic Precautions:RLE weight bearing as tolerated, RUE non weight bearing   Braces: N/A     Functional  Mobility:  · Bed Mobility:     · Supine to Sit: maximal assistance  · Sit to Supine: maximal assistance  · Transfers:     · Sit to Stand:  maximal assistance with rolling walker  · Gait: Able to slide BLE side way toward head of bed maintaining erect posture  · Balance: Fair with RW and platform      AM-PAC 6 CLICK MOBILITY  Turning over in bed (including adjusting bedclothes, sheets and blankets)?: 3  Sitting down on and standing up from a chair with arms (e.g., wheelchair, bedside commode, etc.): 2  Moving from lying on back to sitting on the side of the bed?: 2  Moving to and from a bed to a chair (including a wheelchair)?: 2  Need to walk in hospital room?: 2  Climbing 3-5 steps with a railing?: 1  Total Score: 12       Therapeutic Activities and Exercises:   EOB sitting peformed LAQ, ankle pumps and hip ADD    Patient left supine with all lines intact, call button in reach and bed alarm on..    GOALS:    Physical Therapy Goals        Problem: Physical Therapy Goal    Goal Priority Disciplines Outcome Goal Variances Interventions   Physical Therapy Goal     PT/OT, PT Ongoing (interventions implemented as appropriate)     Description:  Goals to be met by: 2018     Patient will increase functional independence with mobility by performin. Supine to sit with Stand-by Assistance  2. Sit to stand transfer with Stand-by Assistance  3. Bed to chair transfer with Contact Guard Assistance using Rolling Walker with platform  4. Gait  x 25 feet with Contact Guard Assistance using Rolling Walker with platform.                       Time Tracking:     PT Received On: 18  PT Start Time: 0945     PT Stop Time: 1026  PT Total Time (min): 41 min     Billable Minutes: Gait Training 10, Therapeutic Activity 20 and Therapeutic Exercise 10    Treatment Type: Treatment  PT/PTA: PT           Emerson Bynum, PT  2018

## 2018-01-20 NOTE — PROGRESS NOTES
[facility]     Interval:    Pain controlled at rest but significant to the point of limiting mobility when attempting to work with PT. Still has not mobilized adequately  Chaudhari has been placed due to urinary retention, will start bladder training today     Vitals:  Temp:  [97.9 °F (36.6 °C)-100.3 °F (37.9 °C)] 97.9 °F (36.6 °C)  Pulse:  [69-89] 69  Resp:  [16-18] 16  SpO2:  [95 %-98 %] 95 %  BP: (107-138)/(52-65) 118/61       Exam: Unchanged from prior  RUE  Splint in place  No abrasion to posterior elbow at splint  Mild swelling to all digits  Able to move all fingers without pain to stretch  LTS intact  Motor intact  BCR all digits     RLE  Mild pain with log roll  LTS intact   Motor intact  2+ DP        Impression:  72F with R pubic rami fx and R  fx     Plan:  PT today - needs mobilization for DC plan, case management aiding in planning  Platform WB RUE  WBAT BLE  Regular diet   DVT ppx - mechanical and po  Pepcid    I agree with findings outlined by the resident.

## 2018-01-20 NOTE — ASSESSMENT & PLAN NOTE
- edwards in place since 1/19 midnight, drained 1.2L  - not on any benadryl or anti-cholinergic.  - bladder training.

## 2018-01-20 NOTE — PROGRESS NOTES
[facility] Hospital Medicine  Progress Note    Patient Name: Roberto Gonzalez  MRN: [id number]  Patient Class: OP- Observation   Admission Date: 1/17/2018  Length of Stay: 0 days  Attending Physician: Uriel Samson MD  Primary Care Provider: Esau Butler MD        Subjective:     Principal Problem:Trauma    HPI:  72F with pmx of HTN and HLD. She slipped and fell on ice yesterday and landed on her right arm, was found to have fracture on her wrist.     Interval History: The patient is still in pain in her wrist and pelvis. She seems uncomfortable. She still does not have the urge to urinate yet.  Review of Systems   Constitutional: Negative for activity change, appetite change, chills, diaphoresis, fatigue and fever.   HENT: Negative for congestion, hearing loss, sinus pain, sinus pressure and sneezing.    Eyes: Negative for visual disturbance.   Respiratory: Negative for apnea, cough, chest tightness, shortness of breath and wheezing.    Cardiovascular: Negative for chest pain, palpitations and leg swelling.   Gastrointestinal: Negative for abdominal distention, abdominal pain, anal bleeding, blood in stool, constipation, diarrhea, nausea and vomiting.   Endocrine: Negative for polyuria.   Genitourinary: Positive for difficulty urinating. Negative for dysuria and hematuria.   Musculoskeletal: Positive for arthralgias. Negative for back pain, gait problem and joint swelling.     Objective:     Vital Signs (Most Recent):  Temp: 97.9 °F (36.6 °C) (01/20/18 0724)  Pulse: 69 (01/20/18 0724)  Resp: 16 (01/20/18 0724)  BP: 118/61 (01/20/18 0724)  SpO2: 95 % (01/20/18 0438) Vital Signs (24h Range):  Temp:  [97.9 °F (36.6 °C)-100.3 °F (37.9 °C)] 97.9 °F (36.6 °C)  Pulse:  [69-89] 69  Resp:  [16-18] 16  SpO2:  [95 %-98 %] 95 %  BP: (107-138)/(52-65) 118/61     Weight: 69.2 kg (152 lb 8.9 oz)  Body mass index is 25.39 kg/m².    Intake/Output Summary (Last 24 hours) at 01/20/18 0988  Last data filed at  01/20/18 0607   Gross per 24 hour   Intake          1446.25 ml   Output             2100 ml   Net          -653.75 ml      Physical Exam   Constitutional: She is oriented to person, place, and time. She appears well-developed and well-nourished.   HENT:   Head: Normocephalic and atraumatic.   Nose: Nose normal.   Eyes: Conjunctivae and EOM are normal. Right eye exhibits no discharge. Left eye exhibits no discharge. No scleral icterus.   Neck: Normal range of motion. Neck supple. No JVD present.   Cardiovascular: Normal rate, regular rhythm, normal heart sounds and intact distal pulses.  Exam reveals no gallop and no friction rub.    No murmur heard.  Pulmonary/Chest: Effort normal and breath sounds normal. No respiratory distress. She has no wheezes. She has no rales. She exhibits no tenderness.   Abdominal: Soft. Bowel sounds are normal. She exhibits no distension and no mass. There is no tenderness. There is no rebound and no guarding.   Musculoskeletal: Normal range of motion. She exhibits no edema, tenderness or deformity.   R arm is wrapped. She said at rest there's no pain, but there's some pain when she moves. She can tolerate the pain well.   Neurological: She is alert and oriented to person, place, and time.   Skin: Skin is warm. No rash noted. She is not diaphoretic. No erythema. No pallor.   Psychiatric: She has a normal mood and affect. Her behavior is normal. Judgment and thought content normal.   Nursing note and vitals reviewed.      Significant Labs:  Recent Labs      01/17/18   1932  01/19/18   0905   WBC  12.70  6.48   HGB  14.4  12.0   HCT  42.6  36.5*   PLT  236  196   MCV  93  93   RDW  12.5  12.7       Recent Labs      01/17/18   1932  01/19/18   0905   NA  140  139   K  3.7  3.5   CL  101  104   CO2  28  29   GLU  115*  123*   BUN  10  13   CREATININE  0.9  0.8   CALCIUM  10.7*  9.1   PROT  7.6  6.5   ALBUMIN  4.0  3.2*   BILITOT  0.6  0.6   ALKPHOS  83  62   AST  28  18   ALT  25  17    ANIONGAP  11  6*   ESTGFRAFRICA  >60  >60   EGFRNONAA  >60  >60       No results for input(s): MG, PHOS in the last 72 hours.    A1c: No results found for: HGBA1C, Last Gluc: No results for input(s): POCTGLUCOSE in the last 168 hours.    TSH:   Lab Results   Component Value Date    TSH 2.868 04/25/2017       Cardiac Enzymes  @CEU@    [facility]    Recent Labs  Lab 01/17/18  1932   INR 1.0       UA  No results for input(s): COLORU, CLARITYU, SPECGRAV, PHUR, PROTEINUA, GLUCOSEU, BLOODU, WBCU, RBCU, BACTERIA, MUCUS in the last 24 hours.    Invalid input(s):  BILIRUBINCON    Micro  Microbiology Results (last 7 days)     ** No results found for the last 168 hours. **           ABG  No results for input(s): PH, PCO2, PO2, HCO3, POCSATURATED, BE in the last 168 hours.              Significant Imaging:  Imaging Results          X-Ray Wrist 2 View Right (Final result)  Result time 01/17/18 21:41:00    Final result by Bentley Byrd MD (01/17/18 21:41:00)                 Impression:        As above.      Electronically signed by: BENTLEY BYRD MD, MD  Date:     01/17/18  Time:    21:41              Narrative:    COMPARISON: Right wrist series earlier same day    FINDINGS: 2 views right wrist.      Status post interval additional closed reduction of previously noted distal radial and ulnar styloid fractures, now with near anatomic positioning and alignment. No new displaced fracture definitively seen allowing for overlying cast material.                             CT Pelvis Without Contrast (Final result)  Result time 01/17/18 20:53:23    Final result by Eldon Mehta MD (01/17/18 20:53:23)                 Impression:      Acute right superior and inferior pubic rami fractures.  Intra-articular extension of superior pubic ramus fracture which involves the inferomedial wall of the acetabulum.      Electronically signed by: ELDON MEHTA MD  Date:     01/17/18  Time:    20:53              Narrative:    Clinical indication: 72  year old female with pelvic fracture.    Comparison: Right hip and pelvic radiographs from the same date.    Technique: 2.5 mm axial images with sagittal and coronal reformats were obtained through the pelvis without the use of IV contrast.    Findings:  There is acute fracture involving the lateral aspect of the right superior pubic ramus.  There is intra-articular extension of the fracture plane which involves the inferomedial acetabular wall.  Additional acute displaced fracture is seen involving the right inferior pubic ramus.  No additional fractures are seen.  No evidence of proximal femoral fractures.  No evidence of hip dislocation.    Partially visualized intra-abdominal and intrapelvic contents show no acute abnormalities.  Urinary bladder is significantly distended.                             X-Ray Wrist 2 View Right (Final result)  Result time 01/17/18 20:32:26    Final result by Eldon Mehta MD (01/17/18 20:32:26)                 Impression:      As above.      Electronically signed by: ELDON MEHTA MD  Date:     01/17/18  Time:    20:32              Narrative:    Right wrist 2 views.  Comparison: 16:19.    Examination limited by overlying casting material.  There is redemonstration of acute distal radius fracture with persistent 4 mm lateral displacement and mild volar angulation.  Fracture of the ulnar styloid tip is not well appreciated secondary to overlying artifact.                             X-Ray Wrist Complete Right (Final result)  Result time 01/17/18 16:36:18    Final result by Teofilo Calderón MD (01/17/18 16:36:18)                 Impression:     Acute closed, Comminuted impacted fracture distal radius, fracture ulnar styloid process and      Electronically signed by: GONZALO CALDERÓN MD  Date:     01/17/18  Time:    16:36              Narrative:    3 views right wrist.  Comminuted impacted fracture distal radial shaft metaphysis and epiphysis, fracture extends into the  radiocarpal joint, fracture ulnar styloid process, adjacent soft tissue swelling.  No dislocation.  Mild lateral dorsal angulation distal radial fracture fragment.                             X-Ray Pelvis Routine AP (Final result)  Result time 01/17/18 16:36:33    Final result by Timmy Husain MD (01/17/18 16:36:33)                 Impression:        Superior and inferior pubic rami fractures on the right.  The superior pubic rami fracture may involve the acetabulum.  Further evaluation can be obtained with CT of the pelvis.      Electronically signed by: TIMMY HUSAIN MD  Date:     01/17/18  Time:    16:36              Narrative:    Comparison: None    Technique: AP pelvis.    Findings: There are superior and inferior pubic rami fractures on the right.  The superior pubic rami fracture may involve the acetabulum.  There is no evidence of hip dislocation or definite other displaced fractures.  Soft tissue structures demonstrate no acute abnormality.                             X-Ray Hip 2 View Right (Final result)  Result time 01/17/18 16:39:49    Final result by Bentley Byrd MD (01/17/18 16:39:49)                 Impression:        Acute fractures of the right pelvis, as above.      Electronically signed by: BENTLEY BYRD MD, MD  Date:     01/17/18  Time:    16:39              Narrative:    COMPARISON: Pelvic radiograph same day    FINDINGS: 2 views right hip.        Generalized osteopenia. There are acute fractures with displacement involving the medial aspect of the right iliac body with deformity of the iliopectineal line and also left inferior pubic ramus with deformity of the obturator ring.  There is also questionable disruption of the more inferior aspect of the anterior wall of the right acetabulum. The right ileo-ischial line and ischial teardrop appear grossly intact.    Imaged proximal right femur and remainder of the pelvis otherwise appear intact. No dislocation or destructive osseous process  identified.  Mild degenerative change.   No subcutaneous emphysema or radiodense retained foreign body.                                Assessment/Plan:      * Trauma    S/p fall landed on right arm          Postoperative urinary retention    - edwards in place since 1/19 midnight, drained 1.2L  - not on any benadryl or anti-cholinergic.  - bladder training.          GERD (gastroesophageal reflux disease)    Famotidine 20mg BID          Closed fracture of right wrist    Closed fixation done by ortho  Pain and procedure tolerated well  PT/OT on board          Benign essential HTN    Patient was taking half a tablet a day but was not able to take it in the past week because her  is also in the hospital  Started home regimen.   BP stable 140s        Hyperlipidemia    Started home statin            VTE Risk Mitigation         Ordered     enoxaparin injection 40 mg  Daily     Route:  Subcutaneous        01/20/18 0913     Place ZAID hose  Until discontinued      01/17/18 2240     Medium Risk of VTE  Once      01/17/18 2240     Place sequential compression device  Until discontinued      01/17/18 2240          Dispo: continue bladder training, continue PT/OT, pain control    Queta Brar MD  Department of Hospital Medicine   [facility]

## 2018-01-21 PROCEDURE — G0378 HOSPITAL OBSERVATION PER HR: HCPCS

## 2018-01-21 PROCEDURE — 97530 THERAPEUTIC ACTIVITIES: CPT

## 2018-01-21 PROCEDURE — 97110 THERAPEUTIC EXERCISES: CPT

## 2018-01-21 PROCEDURE — 25000003 PHARM REV CODE 250: Performed by: STUDENT IN AN ORGANIZED HEALTH CARE EDUCATION/TRAINING PROGRAM

## 2018-01-21 PROCEDURE — 94761 N-INVAS EAR/PLS OXIMETRY MLT: CPT

## 2018-01-21 PROCEDURE — 63600175 PHARM REV CODE 636 W HCPCS: Performed by: ORTHOPAEDIC SURGERY

## 2018-01-21 PROCEDURE — S5010 5% DEXTROSE AND 0.45% SALINE: HCPCS | Performed by: STUDENT IN AN ORGANIZED HEALTH CARE EDUCATION/TRAINING PROGRAM

## 2018-01-21 RX ORDER — FAMOTIDINE 20 MG/1
40 TABLET, FILM COATED ORAL DAILY
Status: DISCONTINUED | OUTPATIENT
Start: 2018-01-21 | End: 2018-01-21

## 2018-01-21 RX ORDER — FAMOTIDINE 20 MG/1
20 TABLET, FILM COATED ORAL 2 TIMES DAILY
Status: DISCONTINUED | OUTPATIENT
Start: 2018-01-21 | End: 2018-01-24 | Stop reason: HOSPADM

## 2018-01-21 RX ADMIN — PRAVASTATIN SODIUM 10 MG: 10 TABLET ORAL at 08:01

## 2018-01-21 RX ADMIN — ONDANSETRON 8 MG: 8 TABLET, ORALLY DISINTEGRATING ORAL at 08:01

## 2018-01-21 RX ADMIN — DEXTROSE AND SODIUM CHLORIDE: 5; .45 INJECTION, SOLUTION INTRAVENOUS at 03:01

## 2018-01-21 RX ADMIN — HYDROCODONE BITARTRATE AND ACETAMINOPHEN 1 TABLET: 5; 325 TABLET ORAL at 08:01

## 2018-01-21 RX ADMIN — HYDROCODONE BITARTRATE AND ACETAMINOPHEN 1 TABLET: 5; 325 TABLET ORAL at 02:01

## 2018-01-21 RX ADMIN — FAMOTIDINE 20 MG: 20 TABLET ORAL at 08:01

## 2018-01-21 RX ADMIN — FAMOTIDINE 20 MG: 20 TABLET ORAL at 09:01

## 2018-01-21 RX ADMIN — AMLODIPINE BESYLATE 10 MG: 5 TABLET ORAL at 08:01

## 2018-01-21 RX ADMIN — ENOXAPARIN SODIUM 40 MG: 100 INJECTION SUBCUTANEOUS at 05:01

## 2018-01-21 NOTE — PROGRESS NOTES
PGY-1 Progress Note [facility]  Follow up for: urinary retention     Hospital Stay Day 1    Subjective: Patient seen and examined this morning and resting comfortably in bed.  She reports feeling some stomach upset- she reports she is on pepcid at home.  Lasts bowel movement on Wednesday.  She has current edwards catheter in place.  Denies urge to urinate.  Denies headaches, emesis, f/c, sob, chest pain, abdominal pain.      Scheduled Meds:   amLODIPine  10 mg Oral Daily    enoxaparin  40 mg Subcutaneous Daily    famotidine  40 mg Oral Daily    fluticasone  2 spray Each Nare Daily    pravastatin  10 mg Oral Daily     Continuous Infusions:  PRN Meds:acetaminophen, diphenhydrAMINE, hydrocodone-acetaminophen 5-325mg, ondansetron, promethazine (PHENERGAN) IVPB, ramelteon, sodium chloride 0.9%    Review of patient's allergies indicates:  No Known Allergies    Objectives:     Vitals(Most Recent)      BP  Min: 117/56  Max: 141/63  Temp  Av.5 °F (36.9 °C)  Min: 98 °F (36.7 °C)  Max: 98.8 °F (37.1 °C)  Pulse  Av  Min: 72  Max: 90  Resp  Av.7  Min: 16  Max: 19  SpO2  Av.3 %  Min: 96 %  Max: 97 %             Vitals([id number])  Temp:  [98 °F (36.7 °C)-98.8 °F (37.1 °C)]   Pulse:  [72-90]   Resp:  [16-19]   BP: (117-141)/(56-75)   SpO2:  [96 %-97 %]     I & O([id number])    Intake/Output Summary (Last 24 hours) at 18 0906  Last data filed at 18 0816   Gross per 24 hour   Intake             1460 ml   Output             2250 ml   Net             -790 ml     Urinalysis  No results for input(s): COLORU, CLARITYU, SPECGRAV, PHUR, PROTEINUA, GLUCOSEU, BILIRUBINCON, BLOODU, WBCU, RBCU, BACTERIA, MUCUS, NITRITE, LEUKOCYTESUR, UROBILINOGEN, HYALINECASTS in the last 24 hours.    General: AAOx3. NAD. Resting comfortable in bed  HEENT: NCAT. PERRLA. EOMI.   Neck: Supple. No JVD.   CV: RRR. NL S1/S2. No murmur   Chest: NL effort. CTAB.    Abd: +BS x 4. Soft. ND/NT.   Ext: Peripheral pulses intact. NL ROM.  Right arm  is wrapped.  Skin: Intact. No rash. No lesions.   Neuro: CN II-XII intact. No focal deficit. Strength 5/5 throughout. Sensation intact.   Psych: Good judgement and reason. No A/V hallucinations. NL affect. No abnormal behaviors noted    LABS  CBC    Recent Labs  Lab 01/17/18 1932 01/19/18  0905 01/20/18  0953   WBC 12.70 6.48 5.73   RBC 4.60 3.92* 3.70*   HGB 14.4 12.0 11.3*   HCT 42.6 36.5* 34.5*    196 186   MCV 93 93 93   MCH 31.3* 30.6 30.5   MCHC 33.8 32.9 32.8     BMP    Recent Labs  Lab 01/17/18 1932 01/19/18  0905 01/20/18  0953    139 136   K 3.7 3.5 3.9   CO2 28 29 26    104 104   BUN 10 13 11   CREATININE 0.9 0.8 0.7   * 123* 149*       POCT-Glucose  No results found for: POCTGLUCOSE      Recent Labs  Lab 01/17/18 1932 01/19/18  0905 01/20/18  0953   CALCIUM 10.7* 9.1 8.9     LFT    Recent Labs  Lab 01/17/18 1932 01/19/18  0905 01/20/18  0953   PROT 7.6 6.5 6.2   ALBUMIN 4.0 3.2* 2.9*   BILITOT 0.6 0.6 0.4   AST 28 18 17   ALKPHOS 83 62 59   ALT 25 17 16         COAGS    Recent Labs  Lab 01/17/18 1932   INR 1.0     Imaging  Imaging Results          X-Ray Wrist 2 View Right (Final result)  Result time 01/17/18 21:41:00    Final result by Benltey Byrd MD (01/17/18 21:41:00)                 Impression:        As above.      Electronically signed by: BENTLEY BYRD MD, MD  Date:     01/17/18  Time:    21:41              Narrative:    COMPARISON: Right wrist series earlier same day    FINDINGS: 2 views right wrist.      Status post interval additional closed reduction of previously noted distal radial and ulnar styloid fractures, now with near anatomic positioning and alignment. No new displaced fracture definitively seen allowing for overlying cast material.                             CT Pelvis Without Contrast (Final result)  Result time 01/17/18 20:53:23    Final result by Madeline Mcgarry MD (01/17/18 20:53:23)                 Impression:      Acute right superior and  inferior pubic rami fractures.  Intra-articular extension of superior pubic ramus fracture which involves the inferomedial wall of the acetabulum.      Electronically signed by: ELDON MEHTA MD  Date:     01/17/18  Time:    20:53              Narrative:    Clinical indication: 72 year old female with pelvic fracture.    Comparison: Right hip and pelvic radiographs from the same date.    Technique: 2.5 mm axial images with sagittal and coronal reformats were obtained through the pelvis without the use of IV contrast.    Findings:  There is acute fracture involving the lateral aspect of the right superior pubic ramus.  There is intra-articular extension of the fracture plane which involves the inferomedial acetabular wall.  Additional acute displaced fracture is seen involving the right inferior pubic ramus.  No additional fractures are seen.  No evidence of proximal femoral fractures.  No evidence of hip dislocation.    Partially visualized intra-abdominal and intrapelvic contents show no acute abnormalities.  Urinary bladder is significantly distended.                             X-Ray Wrist 2 View Right (Final result)  Result time 01/17/18 20:32:26    Final result by Eldon Mehta MD (01/17/18 20:32:26)                 Impression:      As above.      Electronically signed by: ELDON MEHTA MD  Date:     01/17/18  Time:    20:32              Narrative:    Right wrist 2 views.  Comparison: 16:19.    Examination limited by overlying casting material.  There is redemonstration of acute distal radius fracture with persistent 4 mm lateral displacement and mild volar angulation.  Fracture of the ulnar styloid tip is not well appreciated secondary to overlying artifact.                             X-Ray Wrist Complete Right (Final result)  Result time 01/17/18 16:36:18    Final result by Teofilo Calderón MD (01/17/18 16:36:18)                 Impression:     Acute closed, Comminuted impacted fracture distal  radius, fracture ulnar styloid process and      Electronically signed by: GONZALO HASTINGS MD  Date:     01/17/18  Time:    16:36              Narrative:    3 views right wrist.  Comminuted impacted fracture distal radial shaft metaphysis and epiphysis, fracture extends into the radiocarpal joint, fracture ulnar styloid process, adjacent soft tissue swelling.  No dislocation.  Mild lateral dorsal angulation distal radial fracture fragment.                             X-Ray Pelvis Routine AP (Final result)  Result time 01/17/18 16:36:33    Final result by Timmy Roper MD (01/17/18 16:36:33)                 Impression:        Superior and inferior pubic rami fractures on the right.  The superior pubic rami fracture may involve the acetabulum.  Further evaluation can be obtained with CT of the pelvis.      Electronically signed by: TIMMY ROPER MD  Date:     01/17/18  Time:    16:36              Narrative:    Comparison: None    Technique: AP pelvis.    Findings: There are superior and inferior pubic rami fractures on the right.  The superior pubic rami fracture may involve the acetabulum.  There is no evidence of hip dislocation or definite other displaced fractures.  Soft tissue structures demonstrate no acute abnormality.                             X-Ray Hip 2 View Right (Final result)  Result time 01/17/18 16:39:49    Final result by Carl Caicedo MD (01/17/18 16:39:49)                 Impression:        Acute fractures of the right pelvis, as above.      Electronically signed by: CARL CAICEDO MD, MD  Date:     01/17/18  Time:    16:39              Narrative:    COMPARISON: Pelvic radiograph same day    FINDINGS: 2 views right hip.        Generalized osteopenia. There are acute fractures with displacement involving the medial aspect of the right iliac body with deformity of the iliopectineal line and also left inferior pubic ramus with deformity of the obturator ring.  There is also questionable  disruption of the more inferior aspect of the anterior wall of the right acetabulum. The right ileo-ischial line and ischial teardrop appear grossly intact.    Imaged proximal right femur and remainder of the pelvis otherwise appear intact. No dislocation or destructive osseous process identified.  Mild degenerative change.   No subcutaneous emphysema or radiodense retained foreign body.                              Micro:  Microbiology Results (last 7 days)     ** No results found for the last 168 hours. **         Assessment/Plan:   Trauma     S/p fall landed on right arm       Postoperative urinary retention     - edwards in place since 1/19 midnight, drained 1.2L  - not on any benadryl or anti-cholinergic.  - bladder training.  - will have edwards removed this morning. If in retention, f/u with bladder scan        GERD (gastroesophageal reflux disease)     Famotidine 20mg BID       Closed fracture of right wrist     Closed fixation done by ortho  Pain and procedure tolerated well  PT/OT on board       Benign essential HTN     Patient was taking half a tablet a day but was not able to take it in the past week because her  is also in the hospital  Started home regimen.   BP stable 140s       Hyperlipidemia     Started home statin                Dispo: Follow up bladder scan, continue PT/OT, pain control     Nancy Hartman MD  [facility] PGY1   01/21/2018

## 2018-01-21 NOTE — PT/OT/SLP PROGRESS
Physical Therapy Treatment    Patient Name:  Roberto Gonzalez   MRN:  [id number]    Recommendations:     Discharge Recommendations:  rehabilitation facility   Discharge Equipment Recommendations:  (defer to rehab)   Barriers to discharge: Inaccessible home and Decreased caregiver support    Assessment:     Roberto Gonzalez is a 72 y.o. female admitted with a medical diagnosis of Trauma.  She presents with the following impairments/functional limitations:  weakness, impaired endurance, impaired functional mobilty, gait instability, impaired balance, decreased upper extremity function, decreased lower extremity function, pain, decreased ROM, impaired coordination, orthopedic precautions pt requires Max A with bed mobility and t/f's at this time,pt will benefit from rehab services upon discharge.    Rehab Prognosis:  Good; patient would benefit from acute skilled PT services to address these deficits and reach maximum level of function.      Recent Surgery: * No surgery found *      Plan:     During this hospitalization, patient to be seen BID (M-F) to address the above listed problems via gait training, therapeutic activities, therapeutic exercises  · Plan of Care Expires:  02/16/18   Plan of Care Reviewed with: patient    Subjective     Communicated with nsg prior to session.  Patient found supine upon PT entry to room, agreeable to treatment.      Chief Complaint: R arm pain  Patient comments/goals: pt states she did better yesterday and less pain  Pain/Comfort:  · Pain Rating 1:  (no rating)  · Location - Side 1: Left  · Location - Orientation 1: generalized  · Location 1: arm  · Pain Addressed 1: Pre-medicate for activity, Reposition, Distraction, Cessation of Activity    Patients cultural, spiritual, Restorationist conflicts given the current situation:      Objective:     Patient found with: telemetry     General Precautions: Standard, fall   Orthopedic Precautions:RUE non weight bearing, RLE weight bearing as tolerated    Braces: N/A (R arm cast)     Functional Mobility:  · Bed Mobility:     · Supine to Sit: maximal assistance  · Sit to Supine: maximal assistance and of 2 persons  · Transfers:     · Sit to Stand:  moderate assistance, maximal assistance and of 2 persons with HHA,attempted PRW  · Toilet Transfer: moderate assistance, maximal assistance and of 2 persons with  HHA  using  Stand Pivot  · Gait: n/a  · Balance: fair sitting balamce      AM-PAC 6 CLICK MOBILITY  Turning over in bed (including adjusting bedclothes, sheets and blankets)?: 3  Sitting down on and standing up from a chair with arms (e.g., wheelchair, bedside commode, etc.): 2  Moving from lying on back to sitting on the side of the bed?: 2  Moving to and from a bed to a chair (including a wheelchair)?: 2  Need to walk in hospital room?: 1  Climbing 3-5 steps with a railing?: 1  Total Score: 11       Therapeutic Activities and Exercises: le supine ex's X 10-12 reps inc: ap,qs.hs,abd/add,slr with assistance on R,attempted bed-chair t/f with PRW but unable to perform,bed-commode and back with Mod-Max A X 2 HHA NWB R ue,assisted by nsg,issued pt wet rag and water as needed       Patient left supine with all lines intact, call button in reach and nsg notified..    GOALS: see general POC   Physical Therapy Goals        Problem: Physical Therapy Goal    Goal Priority Disciplines Outcome Goal Variances Interventions   Physical Therapy Goal     PT/OT, PT Ongoing (interventions implemented as appropriate)     Description:  Goals to be met by: 2018     Patient will increase functional independence with mobility by performin. Supine to sit with Stand-by Assistance  2. Sit to stand transfer with Stand-by Assistance  3. Bed to chair transfer with Contact Guard Assistance using Rolling Walker with platform  4. Gait  x 25 feet with Contact Guard Assistance using Rolling Walker with platform.                       Time Tracking:     PT Received On: 18  PT  Start Time: 1119     PT Stop Time: 1205  PT Total Time (min): 46 min     Billable Minutes: Therapeutic Activity 35 and Therapeutic Exercise 11    Treatment Type: Treatment  PT/PTA: PTA     PTA Visit Number: 1     Shawn Lewis, LOS  01/21/2018

## 2018-01-21 NOTE — PROGRESS NOTES
Pt still unable to urinate. Pt reports feeling the need to urinate, denies discomfort at present. MD called and notified. MD ordered that if pt has not urinated by 1800 tonight to perform straight cath. Will continue to monitor.

## 2018-01-21 NOTE — PROGRESS NOTES
[facility]     Interval:   No issues overnight, still very limited with PT due to pain in the right pelvis.   Still with edwards due to failure to void without. Has been doing bladder training but still has not urgency to void.     Vitals:  Temp:  [98 °F (36.7 °C)-98.8 °F (37.1 °C)] 98.8 °F (37.1 °C)  Pulse:  [72-90] 84  Resp:  [16-19] 16  SpO2:  [96 %-97 %] 96 %  BP: (117-141)/(56-75) 139/65       Exam: Unchanged from prior  RUE  Splint in place  No abrasion to posterior elbow at splint  Mild swelling to all digits  Able to move all fingers without pain to stretch  LTS intact  Motor intact  BCR all digits     RLE  Mild pain with log roll  LTS intact   Motor intact  2+ DP        Impression:  72F with R pubic rami fx and R  fx     Plan:  PT today - needs mobilization for DC plan, case management aiding in planning  Currently in bladder training, will plan to trial edwards removal this AM   Platform WB RUE  WBAT BLE  Regular diet   DVT ppx - changed to lovenox yesterday in addition to mechanical  Pepcid

## 2018-01-21 NOTE — PLAN OF CARE
Problem: Patient Care Overview  Goal: Plan of Care Review  Outcome: Ongoing (interventions implemented as appropriate)  Telemetry remains sinus rhythms   Pt shifting weight per self often in bed  Reluctant to turn due to discomfort to right side  Encouraged to deep breathe and cough  Maintaining right arm elevated  Safety maintained   Chaudhari maintained due to urinary retention over the past 24 hours that did not improve with in and out catheterization   Medicated for pain on request

## 2018-01-21 NOTE — PLAN OF CARE
Problem: Patient Care Overview  Goal: Plan of Care Review  Pt is AAOX4 and in NAD. Medications administered per PRN request. Continuous infusion monitored. Pt edwards care managed. Pt repositioning in bed per self. Telemetry showing normal sinus in 70s. Pt encouraged to use call light should any needs arise.

## 2018-01-21 NOTE — PLAN OF CARE
Problem: Physical Therapy Goal  Goal: Physical Therapy Goal  Goals to be met by: 2018     Patient will increase functional independence with mobility by performin. Supine to sit with Stand-by Assistance  2. Sit to stand transfer with Stand-by Assistance  3. Bed to chair transfer with Contact Guard Assistance using Rolling Walker with platform  4. Gait  x 25 feet with Contact Guard Assistance using Rolling Walker with platform.      Outcome: Ongoing (interventions implemented as appropriate)  Goals ongoing

## 2018-01-21 NOTE — PROGRESS NOTES
Spoke with MD regarding pt inability to void 6 hrs post-edwards removal. MD ordered to continue to monitoring pt. Will report back.

## 2018-01-22 ENCOUNTER — TELEPHONE (OUTPATIENT)
Dept: FAMILY MEDICINE | Facility: CLINIC | Age: 73
End: 2018-01-22

## 2018-01-22 PROBLEM — S32.414A CLOSED NONDISPLACED FRACTURE OF ANTERIOR WALL OF RIGHT ACETABULUM: Status: ACTIVE | Noted: 2018-01-22

## 2018-01-22 PROBLEM — R33.8 POSTOPERATIVE URINARY RETENTION: Status: ACTIVE | Noted: 2018-01-22

## 2018-01-22 PROBLEM — S62.101A CLOSED FRACTURE OF RIGHT WRIST: Status: ACTIVE | Noted: 2018-01-22

## 2018-01-22 PROBLEM — K21.9 GERD (GASTROESOPHAGEAL REFLUX DISEASE): Status: ACTIVE | Noted: 2018-01-22

## 2018-01-22 PROBLEM — S52.531A FRACTURE, COLLES, RIGHT, CLOSED: Status: ACTIVE | Noted: 2018-01-22

## 2018-01-22 PROBLEM — N99.89 POSTOPERATIVE URINARY RETENTION: Status: ACTIVE | Noted: 2018-01-22

## 2018-01-22 LAB
ALBUMIN SERPL BCP-MCNC: 2.9 G/DL
ALP SERPL-CCNC: 63 U/L
ALT SERPL W/O P-5'-P-CCNC: 18 U/L
ANION GAP SERPL CALC-SCNC: 7 MMOL/L
AST SERPL-CCNC: 19 U/L
BASOPHILS # BLD AUTO: 0.01 K/UL
BASOPHILS NFR BLD: 0.1 %
BILIRUB SERPL-MCNC: 0.7 MG/DL
BUN SERPL-MCNC: 15 MG/DL
CALCIUM SERPL-MCNC: 9.2 MG/DL
CHLORIDE SERPL-SCNC: 102 MMOL/L
CO2 SERPL-SCNC: 27 MMOL/L
CREAT SERPL-MCNC: 0.8 MG/DL
DIFFERENTIAL METHOD: ABNORMAL
EOSINOPHIL # BLD AUTO: 0.2 K/UL
EOSINOPHIL NFR BLD: 2.1 %
ERYTHROCYTE [DISTWIDTH] IN BLOOD BY AUTOMATED COUNT: 12.4 %
EST. GFR  (AFRICAN AMERICAN): >60 ML/MIN/1.73 M^2
EST. GFR  (NON AFRICAN AMERICAN): >60 ML/MIN/1.73 M^2
GLUCOSE SERPL-MCNC: 108 MG/DL
HCT VFR BLD AUTO: 33.2 %
HGB BLD-MCNC: 11.2 G/DL
LYMPHOCYTES # BLD AUTO: 1.3 K/UL
LYMPHOCYTES NFR BLD: 16.3 %
MCH RBC QN AUTO: 31.1 PG
MCHC RBC AUTO-ENTMCNC: 33.7 G/DL
MCV RBC AUTO: 92 FL
MONOCYTES # BLD AUTO: 0.7 K/UL
MONOCYTES NFR BLD: 8.2 %
NEUTROPHILS # BLD AUTO: 5.8 K/UL
NEUTROPHILS NFR BLD: 73.2 %
PLATELET # BLD AUTO: 227 K/UL
PMV BLD AUTO: 9.5 FL
POTASSIUM SERPL-SCNC: 3.8 MMOL/L
PROT SERPL-MCNC: 6.6 G/DL
RBC # BLD AUTO: 3.6 M/UL
SODIUM SERPL-SCNC: 136 MMOL/L
WBC # BLD AUTO: 7.97 K/UL

## 2018-01-22 PROCEDURE — 97110 THERAPEUTIC EXERCISES: CPT

## 2018-01-22 PROCEDURE — 36415 COLL VENOUS BLD VENIPUNCTURE: CPT

## 2018-01-22 PROCEDURE — 63600175 PHARM REV CODE 636 W HCPCS: Performed by: ORTHOPAEDIC SURGERY

## 2018-01-22 PROCEDURE — 97530 THERAPEUTIC ACTIVITIES: CPT

## 2018-01-22 PROCEDURE — 25000003 PHARM REV CODE 250: Performed by: STUDENT IN AN ORGANIZED HEALTH CARE EDUCATION/TRAINING PROGRAM

## 2018-01-22 PROCEDURE — 85025 COMPLETE CBC W/AUTO DIFF WBC: CPT

## 2018-01-22 PROCEDURE — G0378 HOSPITAL OBSERVATION PER HR: HCPCS

## 2018-01-22 PROCEDURE — 94761 N-INVAS EAR/PLS OXIMETRY MLT: CPT

## 2018-01-22 PROCEDURE — 80053 COMPREHEN METABOLIC PANEL: CPT

## 2018-01-22 RX ADMIN — HYDROCODONE BITARTRATE AND ACETAMINOPHEN 1 TABLET: 5; 325 TABLET ORAL at 09:01

## 2018-01-22 RX ADMIN — AMLODIPINE BESYLATE 10 MG: 5 TABLET ORAL at 09:01

## 2018-01-22 RX ADMIN — HYDROCODONE BITARTRATE AND ACETAMINOPHEN 1 TABLET: 5; 325 TABLET ORAL at 05:01

## 2018-01-22 RX ADMIN — FAMOTIDINE 20 MG: 20 TABLET ORAL at 09:01

## 2018-01-22 RX ADMIN — ENOXAPARIN SODIUM 40 MG: 100 INJECTION SUBCUTANEOUS at 05:01

## 2018-01-22 RX ADMIN — PRAVASTATIN SODIUM 10 MG: 10 TABLET ORAL at 09:01

## 2018-01-22 RX ADMIN — HYDROCODONE BITARTRATE AND ACETAMINOPHEN 1 TABLET: 5; 325 TABLET ORAL at 12:01

## 2018-01-22 RX ADMIN — HYDROCODONE BITARTRATE AND ACETAMINOPHEN 1 TABLET: 5; 325 TABLET ORAL at 03:01

## 2018-01-22 RX ADMIN — HYDROCODONE BITARTRATE AND ACETAMINOPHEN 1 TABLET: 5; 325 TABLET ORAL at 07:01

## 2018-01-22 NOTE — PLAN OF CARE
Problem: Occupational Therapy Goal  Goal: Occupational Therapy Goal  Goals to be met by: 2/18/18     Patient will increase functional independence with ADLs by performing:    LE Dressing with Minimal Assistance.  Grooming while standing with Contact Guard Assistance.  Toileting from bedside commode with Minimal Assistance for hygiene and clothing management.   Supine to sit with Minimal Assistance.  Stand pivot transfers with Minimal Assistance.  Toilet transfer to bedside commode with Minimal Assistance.  Increased functional strength to WFL for self care skills and functional mobility.  Upper extremity exercise program x10 reps per handout, with independence.     Outcome: Ongoing (interventions implemented as appropriate)  Roberto Gonzalez is a 72 y.o. female with a medical diagnosis of Trauma.  She presents with pain, orthopedic precautions and decreased overall strength, endurance, balance and [place] and safety with ADL's and fx mobility. Performance deficits affecting function are weakness, impaired endurance, impaired self care skills, impaired functional mobilty, gait instability, impaired balance, decreased upper extremity function, decreased lower extremity function, decreased safety awareness, pain, decreased ROM, impaired coordination, impaired fine motor, impaired skin, edema, orthopedic precautions.  Pt unable to take steps secondary to increased pain when wt shifting to RLE. Continue OT services to address functional goals, progressing as able.      NATALIE Jaeger/L

## 2018-01-22 NOTE — PROGRESS NOTES
made phone contact with [facility] rehab admit coordinator, Mino to check status of referral.  Mino reported he is training someone and will follow up with  in one hour regarding status of referral.

## 2018-01-22 NOTE — PROGRESS NOTES
PGY-1 Progress Note [facility]  Follow up for: urinary retention     Hospital Stay Day 6    Subjective: Patient seen and examined this morning and resting comfortably in bed.  No compliants, pain is better. Working with PT/OT, she has peed by herself earlier in the AM and she wears diaper now.  Denies headaches, emesis, f/c, sob, chest pain, abdominal pain.      Scheduled Meds:   amLODIPine  10 mg Oral Daily    enoxaparin  40 mg Subcutaneous Daily    famotidine  20 mg Oral BID    fluticasone  2 spray Each Nare Daily    pravastatin  10 mg Oral Daily     Continuous Infusions:  PRN Meds:acetaminophen, diphenhydrAMINE, hydrocodone-acetaminophen 5-325mg, ondansetron, promethazine (PHENERGAN) IVPB, ramelteon, sodium chloride 0.9%    Review of patient's allergies indicates:  No Known Allergies    Objectives:     Vitals(Most Recent)      BP  Min: 108/56  Max: 128/62  Temp  Av.2 °F (37.3 °C)  Min: 97.8 °F (36.6 °C)  Max: 100.1 °F (37.8 °C)  Pulse  Av.2  Min: 83  Max: 101  Resp  Av  Min: 16  Max: 18  SpO2  Av.2 %  Min: 90 %  Max: 96 %  Weight  Av.8 kg (167 lb 1.7 oz)  Min: 75.8 kg (167 lb 1.7 oz)  Max: 75.8 kg (167 lb 1.7 oz)             Vitals([id number])  Temp:  [97.8 °F (36.6 °C)-100.1 °F (37.8 °C)]   Pulse:  []   Resp:  [16-18]   BP: (108-128)/(55-62)   SpO2:  [90 %-96 %]     I & O(Wavu25g)    Intake/Output Summary (Last 24 hours) at 18 1001  Last data filed at 18 0610   Gross per 24 hour   Intake                0 ml   Output              708 ml   Net             -708 ml     Urinalysis  No results for input(s): COLORU, CLARITYU, SPECGRAV, PHUR, PROTEINUA, GLUCOSEU, BILIRUBINCON, BLOODU, WBCU, RBCU, BACTERIA, MUCUS, NITRITE, LEUKOCYTESUR, UROBILINOGEN, HYALINECASTS in the last 24 hours.    General: AAOx3. NAD. Resting comfortable in bed  HEENT: NCAT. PERRLA. EOMI.   Neck: Supple. No JVD.   CV: RRR. NL S1/S2. No murmur   Chest: NL effort. CTAB.    Abd: +BS x 4. Soft. ND/NT.   Ext:  Peripheral pulses intact. NL ROM.  Right arm is wrapped.  Skin: Intact. No rash. No lesions.   Neuro: CN II-XII intact. No focal deficit. Strength 5/5 throughout. Sensation intact.   Psych: Good judgement and reason. No A/V hallucinations. NL affect. No abnormal behaviors noted    LABS  CBC    Recent Labs  Lab 01/17/18 1932 01/19/18  0905 01/20/18  0953   WBC 12.70 6.48 5.73   RBC 4.60 3.92* 3.70*   HGB 14.4 12.0 11.3*   HCT 42.6 36.5* 34.5*    196 186   MCV 93 93 93   MCH 31.3* 30.6 30.5   MCHC 33.8 32.9 32.8     BMP    Recent Labs  Lab 01/17/18 1932 01/19/18  0905 01/20/18  0953    139 136   K 3.7 3.5 3.9   CO2 28 29 26    104 104   BUN 10 13 11   CREATININE 0.9 0.8 0.7   * 123* 149*       POCT-Glucose  No results found for: POCTGLUCOSE      Recent Labs  Lab 01/17/18 1932 01/19/18  0905 01/20/18  0953   CALCIUM 10.7* 9.1 8.9     LFT    Recent Labs  Lab 01/17/18 1932 01/19/18  0905 01/20/18  0953   PROT 7.6 6.5 6.2   ALBUMIN 4.0 3.2* 2.9*   BILITOT 0.6 0.6 0.4   AST 28 18 17   ALKPHOS 83 62 59   ALT 25 17 16         COAGS    Recent Labs  Lab 01/17/18 1932   INR 1.0     Imaging  Imaging Results          X-Ray Wrist 2 View Right (Final result)  Result time 01/17/18 21:41:00    Final result by Bentley Byrd MD (01/17/18 21:41:00)                 Impression:        As above.      Electronically signed by: BENTLEY BYRD MD, MD  Date:     01/17/18  Time:    21:41              Narrative:    COMPARISON: Right wrist series earlier same day    FINDINGS: 2 views right wrist.      Status post interval additional closed reduction of previously noted distal radial and ulnar styloid fractures, now with near anatomic positioning and alignment. No new displaced fracture definitively seen allowing for overlying cast material.                             CT Pelvis Without Contrast (Final result)  Result time 01/17/18 20:53:23    Final result by Madeline Mcgarry MD (01/17/18 20:53:23)                  Impression:      Acute right superior and inferior pubic rami fractures.  Intra-articular extension of superior pubic ramus fracture which involves the inferomedial wall of the acetabulum.      Electronically signed by: ELDON MEHTA MD  Date:     01/17/18  Time:    20:53              Narrative:    Clinical indication: 72 year old female with pelvic fracture.    Comparison: Right hip and pelvic radiographs from the same date.    Technique: 2.5 mm axial images with sagittal and coronal reformats were obtained through the pelvis without the use of IV contrast.    Findings:  There is acute fracture involving the lateral aspect of the right superior pubic ramus.  There is intra-articular extension of the fracture plane which involves the inferomedial acetabular wall.  Additional acute displaced fracture is seen involving the right inferior pubic ramus.  No additional fractures are seen.  No evidence of proximal femoral fractures.  No evidence of hip dislocation.    Partially visualized intra-abdominal and intrapelvic contents show no acute abnormalities.  Urinary bladder is significantly distended.                             X-Ray Wrist 2 View Right (Final result)  Result time 01/17/18 20:32:26    Final result by Eldon Mehta MD (01/17/18 20:32:26)                 Impression:      As above.      Electronically signed by: ELDON MEHTA MD  Date:     01/17/18  Time:    20:32              Narrative:    Right wrist 2 views.  Comparison: 16:19.    Examination limited by overlying casting material.  There is redemonstration of acute distal radius fracture with persistent 4 mm lateral displacement and mild volar angulation.  Fracture of the ulnar styloid tip is not well appreciated secondary to overlying artifact.                             X-Ray Wrist Complete Right (Final result)  Result time 01/17/18 16:36:18    Final result by Teofilo Calderón MD (01/17/18 16:36:18)                 Impression:     Acute  closed, Comminuted impacted fracture distal radius, fracture ulnar styloid process and      Electronically signed by: GONZALO HASTINGS MD  Date:     01/17/18  Time:    16:36              Narrative:    3 views right wrist.  Comminuted impacted fracture distal radial shaft metaphysis and epiphysis, fracture extends into the radiocarpal joint, fracture ulnar styloid process, adjacent soft tissue swelling.  No dislocation.  Mild lateral dorsal angulation distal radial fracture fragment.                             X-Ray Pelvis Routine AP (Final result)  Result time 01/17/18 16:36:33    Final result by Timmy Roper MD (01/17/18 16:36:33)                 Impression:        Superior and inferior pubic rami fractures on the right.  The superior pubic rami fracture may involve the acetabulum.  Further evaluation can be obtained with CT of the pelvis.      Electronically signed by: TIMMY ROPER MD  Date:     01/17/18  Time:    16:36              Narrative:    Comparison: None    Technique: AP pelvis.    Findings: There are superior and inferior pubic rami fractures on the right.  The superior pubic rami fracture may involve the acetabulum.  There is no evidence of hip dislocation or definite other displaced fractures.  Soft tissue structures demonstrate no acute abnormality.                             X-Ray Hip 2 View Right (Final result)  Result time 01/17/18 16:39:49    Final result by Carl Caicedo MD (01/17/18 16:39:49)                 Impression:        Acute fractures of the right pelvis, as above.      Electronically signed by: CARL CAICEDO MD, MD  Date:     01/17/18  Time:    16:39              Narrative:    COMPARISON: Pelvic radiograph same day    FINDINGS: 2 views right hip.        Generalized osteopenia. There are acute fractures with displacement involving the medial aspect of the right iliac body with deformity of the iliopectineal line and also left inferior pubic ramus with deformity of the  obturator ring.  There is also questionable disruption of the more inferior aspect of the anterior wall of the right acetabulum. The right ileo-ischial line and ischial teardrop appear grossly intact.    Imaged proximal right femur and remainder of the pelvis otherwise appear intact. No dislocation or destructive osseous process identified.  Mild degenerative change.   No subcutaneous emphysema or radiodense retained foreign body.                              Micro:  Microbiology Results (last 7 days)     ** No results found for the last 168 hours. **         Assessment/Plan:   Trauma     S/p fall landed on right arm       Postoperative urinary retention     - edwards in place since 1/19 midnight, drained 1.2L  - not on any benadryl or anti-cholinergic.  - bladder training.  - Bladder trained  - wears diaper and has peed by herself early in AM       GERD (gastroesophageal reflux disease)     Famotidine 20mg BID       Closed fracture of right wrist     Closed fixation done by ortho  Pain and procedure tolerated well  PT/OT on board       Benign essential HTN     Patient was taking half a tablet a day but was not able to take it in the past week because her  is also in the hospital  Started home regimen.   BP stable 140s       Hyperlipidemia     Started home statin                Dispo: reported urinated by herself this AM, continue PT/OT,  Placement. Will sign off now, please let us know should you have any questions. Appreciated the consult.   Camacho Brar, HO1

## 2018-01-22 NOTE — PLAN OF CARE
Problem: Patient Care Overview  Goal: Plan of Care Review  Outcome: Ongoing (interventions implemented as appropriate)  Pt on RA with sats of  93%, Will continue to monitor.

## 2018-01-22 NOTE — PROGRESS NOTES
made phone contact with [facility] admit coordinator, Shonda to check status of referral.  Shonda reported that clinicals have not been reviewed because [facility] does not have an available bed.     made phone contact with [facility] ([facility] Rehab admit coordinator, Zoey Dumont to check status of referral.  Zoey reported referral has not been reviewed because [facility] does not have an available bed.     made phone contact with [facility] admit coordinator, Mino [phone] to check status of referral.  Mino reported that he need to review current clinicals in order to make a determination.     faxed clinicals to Mino at [facility] via [employer] system.

## 2018-01-22 NOTE — PT/OT/SLP PROGRESS
Physical Therapy Treatment    Patient Name:  Roberto Gonzalez   MRN:  [id number]    Recommendations:     Discharge Recommendations:  rehabilitation facility   Discharge Equipment Recommendations:  (defer to rehab)   Barriers to discharge: Decreased caregiver support    Assessment:     Roberto oGnzalez is a 72 y.o. female admitted with a medical diagnosis of Trauma.  She presents with the following impairments/functional limitations:  weakness, impaired endurance, impaired functional mobilty, decreased upper extremity function, decreased lower extremity function, pain, decreased ROM, impaired coordination, orthopedic precautions pt with good participation but having difficulty bearing weight at R le and unable to take steps at this time,requires assist of 2 for safety.    Rehab Prognosis:  Good; patient would benefit from acute skilled PT services to address these deficits and reach maximum level of function.      Recent Surgery: * No surgery found *      Plan:     During this hospitalization, patient to be seen BID (M-F) to address the above listed problems via gait training, therapeutic activities, therapeutic exercises  · Plan of Care Expires:  02/16/18   Plan of Care Reviewed with: patient    Subjective     Communicated with nsg prior to session.  Patient found supine upon PT entry to room, agreeable to treatment.      Chief Complaint: R hip pain increased with WB  Patient comments/goals: pt is trying her best   Pain/Comfort:  · Pain Rating 1: 3/10  · Location - Side 1: Right  · Location - Orientation 1: generalized  · Location 1: hip  · Pain Addressed 1: Pre-medicate for activity, Reposition, Distraction  · Pain Rating Post-Intervention 1: 8/10    Patients cultural, spiritual, Catholic conflicts given the current situation:      Objective:     Patient found with: telemetry     General Precautions: Standard, fall   Orthopedic Precautions:RUE non weight bearing, RLE weight bearing as tolerated   Braces:  (R arm cast)      Functional Mobility:  · Bed Mobility:     · Supine to Sit: maximal assistance  · Transfers:     · Sit to Stand:  moderate assistance and of 2 persons with platform walker and HHA X 2  · Bed to Chair: moderate assistance and of 2 persons with  HHA  using  Stand Pivot  · Balance: fair sitting balance      AM-PAC 6 CLICK MOBILITY  Turning over in bed (including adjusting bedclothes, sheets and blankets)?: 2  Sitting down on and standing up from a chair with arms (e.g., wheelchair, bedside commode, etc.): 2  Moving from lying on back to sitting on the side of the bed?: 2  Moving to and from a bed to a chair (including a wheelchair)?: 2  Need to walk in hospital room?: 1  Climbing 3-5 steps with a railing?: 1  Total Score: 10       Therapeutic Activities and Exercises:        Patient left up in chair with call button in reach and nsg notified..    GOALS: see general POC   Physical Therapy Goals        Problem: Physical Therapy Goal    Goal Priority Disciplines Outcome Goal Variances Interventions   Physical Therapy Goal     PT/OT, PT Ongoing (interventions implemented as appropriate)     Description:  Goals to be met by: 2018     Patient will increase functional independence with mobility by performin. Supine to sit with Stand-by Assistance  2. Sit to stand transfer with Stand-by Assistance  3. Bed to chair transfer with Contact Guard Assistance using Rolling Walker with platform  4. Gait  x 25 feet with Contact Guard Assistance using Rolling Walker with platform.                       Time Tracking:     PT Received On: 18  PT Start Time: 1003     PT Stop Time: 1033  PT Total Time (min): 30 min     Billable Minutes: Therapeutic Activity 15 and Total Time 30    Treatment Type: Treatment co-rx with OT  PT/PTA: PTA     PTA Visit Number: 2     Shawn Lewis PTA  2018

## 2018-01-22 NOTE — PT/OT/SLP PROGRESS
Physical Therapy Treatment    Patient Name:  Roberto Gonzalez   MRN:  [id number]    Recommendations:     Discharge Recommendations:  rehabilitation facility   Discharge Equipment Recommendations:  (defer to rehab)   Barriers to discharge: Decreased caregiver support    Assessment:     Roberto Gonzalez is a 72 y.o. female admitted with a medical diagnosis of Trauma.  She presents with the following impairments/functional limitations:  weakness, impaired endurance, impaired functional mobilty, impaired balance, decreased upper extremity function, decreased lower extremity function, pain, decreased ROM, impaired coordination, orthopedic precautions pt able to tolerate OOB ~1.5 hrs today,unable to ambulate at this time,good participation.    Rehab Prognosis:  Good; patient would benefit from acute skilled PT services to address these deficits and reach maximum level of function.      Recent Surgery: * No surgery found *      Plan:     During this hospitalization, patient to be seen BID (M-F) to address the above listed problems via gait training, therapeutic activities, therapeutic exercises  · Plan of Care Expires:  02/16/18   Plan of Care Reviewed with: patient    Subjective     Communicated with nsg prior to session.  Patient found up in chair upon PT entry to room, agreeable to treatment.      Chief Complaint: R arm heaviness and pain  Patient comments/goals: pt is trying to do what she can a day at a time  Pain/Comfort:  · Pain Rating 1: 4/10  · Location - Side 1: Right  · Location - Orientation 1: generalized  · Location 1: arm  · Pain Addressed 1: Reposition, Distraction, Nurse notified  · Pain Rating Post-Intervention 1: 8/10    Patients cultural, spiritual, Adventist conflicts given the current situation:      Objective:     Patient found with: telemetry     General Precautions: Standard, fall   Orthopedic Precautions:RUE non weight bearing, RLE weight bearing as tolerated   Braces:  (R arm cast)     Functional  Mobility:  · Bed Mobility:     · Sit to Supine: maximal assistance and of 2 persons  · Transfers:     · Sit to Stand:  moderate assistance and of 2 persons with HHA  · Bed to Chair: moderate assistance and of 2 persons with  HHA  using  Stand Pivot  · Toilet Transfer: moderate assistance and of 2 persons with  HHA  using  Stand Pivot  · Balance: fair sitting balance      AM-PAC 6 CLICK MOBILITY  Turning over in bed (including adjusting bedclothes, sheets and blankets)?: 2  Sitting down on and standing up from a chair with arms (e.g., wheelchair, bedside commode, etc.): 2  Moving from lying on back to sitting on the side of the bed?: 2  Moving to and from a bed to a chair (including a wheelchair)?: 2  Need to walk in hospital room?: 1  Climbing 3-5 steps with a railing?: 1  Total Score: 10       Therapeutic Activities and Exercises: le supine ex's X 15 reps inc: ap,qs,hs,abd/add,slr with assist on R,pt up in chair ~1.5 hrs today       Patient left supine with all lines intact, call button in reach, bed alarm on and nsg notified..    GOALS:  See general POC   Physical Therapy Goals        Problem: Physical Therapy Goal    Goal Priority Disciplines Outcome Goal Variances Interventions   Physical Therapy Goal     PT/OT, PT Ongoing (interventions implemented as appropriate)     Description:  Goals to be met by: 2018     Patient will increase functional independence with mobility by performin. Supine to sit with Stand-by Assistance  2. Sit to stand transfer with Stand-by Assistance  3. Bed to chair transfer with Contact Guard Assistance using Rolling Walker with platform  4. Gait  x 25 feet with Contact Guard Assistance using Rolling Walker with platform.                       Time Tracking:     PT Received On: 18  PT Start Time: 1158     PT Stop Time: 1236  PT Total Time (min): 38 min     Billable Minutes: Therapeutic Activity 26 and Therapeutic Exercise 12    Treatment Type: Treatment  PT/PTA: PTA      PTA Visit Number: 2     Shawn Lewis, PTA  01/22/2018

## 2018-01-22 NOTE — PROGRESS NOTES
Interval:   Voided overnight  Has not had BM  Denies any new issue    Vitals:  Temp:  [97.8 °F (36.6 °C)-100.1 °F (37.8 °C)] 98.9 °F (37.2 °C)  Pulse:  [] 90  Resp:  [16-18] 18  SpO2:  [90 %-96 %] 93 %  BP: (108-139)/(55-65) 126/57    Scheduled Meds:    amLODIPine  10 mg Oral Daily    enoxaparin  40 mg Subcutaneous Daily    famotidine  20 mg Oral BID    fluticasone  2 spray Each Nare Daily    pravastatin  10 mg Oral Daily     Continuous Infusions:   PRN Meds: acetaminophen, diphenhydrAMINE, hydrocodone-acetaminophen 5-325mg, ondansetron, promethazine (PHENERGAN) IVPB, ramelteon, sodium chloride 0.9%    Diet: Diet Adult Regular    Trended Lab Data:    Recent Labs  Lab 01/17/18  1932 01/19/18  0905 01/20/18  0953   WBC 12.70 6.48 5.73   HGB 14.4 12.0 11.3*   HCT 42.6 36.5* 34.5*    196 186   MCV 93 93 93   RDW 12.5 12.7 12.4    139 136   K 3.7 3.5 3.9    104 104   CO2 28 29 26   BUN 10 13 11   CREATININE 0.9 0.8 0.7   * 123* 149*   PROT 7.6 6.5 6.2   ALBUMIN 4.0 3.2* 2.9*   BILITOT 0.6 0.6 0.4   AST 28 18 17   ALKPHOS 83 62 59   ALT 25 17 16       I/O last 3 completed shifts:  In: 1710 [P.O.:710; I.V.:1000]  Out: 2900 [Urine:2900]    Exam: Unchanged from prior  RUE  Splint in place  No abrasion to posterior elbow at splint  Mild swelling to all digits  Able to move all fingers without pain to stretch  LTS intact  Motor intact  BCR all digits     RLE  Mild pain with log roll  LTS intact   Motor intact  2+ DP    Impression:  72F with R pubic rami fx and R DR fx     Plan:  PT today - needs mobilization for DC plan, case management aiding in planning  Would like to avoid continued cathing if possible  Platform WB RUE  WBAT BLE  Regular diet   DVT ppx - changed to lovenox yesterday in addition to mechanical  Pepcid

## 2018-01-22 NOTE — TELEPHONE ENCOUNTER
----- Message from Angelica Gao sent at 1/22/2018 11:48 AM CST -----  Contact: [phone]/self  Patient would like to be seen soon for hospital follow up. Please advise.

## 2018-01-22 NOTE — PROGRESS NOTES
Performed in and out cath. Drained 650mL of urine. Pt tolerated procedure well. Will continue to monitor.

## 2018-01-22 NOTE — PT/OT/SLP PROGRESS
Occupational Therapy   Treatment    Name: Roberto Gonzalez  MRN: [id number]  Admitting Diagnosis:  Trauma       Recommendations:     Discharge Recommendations: rehabilitation facility  Discharge Equipment Recommendations:   (TBD)  Barriers to discharge:  Inaccessible home environment, Decreased caregiver support    Subjective     Communicated with: RN prior to session.  Pain/Comfort:  · Pain Rating 1: 3/10 (increased to 8/10 with mobility)  · Location - Side 1: Right  · Location - Orientation 1: generalized  · Location 1: hip  · Pain Addressed 1: Pre-medicate for activity, Distraction    Objective:     Patient found with: telemetry, peripheral IV    General Precautions: Standard, fall   Orthopedic Precautions:RUE non weight bearing, RLE weight bearing as tolerated   Braces:  (R arm cast 2/2 DR rodriguez)     Occupational Performance:    Bed Mobility:    · Patient completed Rolling/Turning to Left with  minimum assistance  · Patient completed Scooting/Bridging with moderate assistance, seated to EOB  · Patient completed Supine to Sit with maximal assistance and increased time and effort, vc's for effective technique     Functional Mobility/Transfers:  · Patient completed Sit <> Stand Transfer with moderate assistance  with  platform walker, elevated bed  · Patient completed Bed <> Chair Transfer using Stand Pivot technique with moderate assistance and of 2 persons with no assistive device    Activities of Daily Living:  · UB Dressing: minimum assistance and instructed on drsg tech    · LB Dressing: total assistance mandeep socks    Patient left up in chair with all lines intact, call button in reach and RN notified    AMPA 6 Click:  AMPAC Total Score: 15    Treatment & Education:  Sit<>stand with Mod A x 1, and CGA x 1  for safety, x 2 trials.  Pt stood ~45 seconds each trial with CGA-Min A x 2.  Pt unable to weight shift to RLE secondary to increased pain and she required Max A to advance R LE forward.  (See PT notes for  details).  R hand AROM for fisting, thumb circles, and finger spreads x 20 reps.  Instructed pt to perform hand exercises throughout day and keep UE elevated on pillow. Pt reports decreased swelling in hand and decreased pain.   Education:    Assessment:     Roberto Gonzalez is a 72 y.o. female with a medical diagnosis of Trauma.  She presents with pain, orthopedic precautions and decreased overall strength, endurance, balance and Qu[place] and safety with ADL's and fx mobility. Performance deficits affecting function are weakness, impaired endurance, impaired self care skills, impaired functional mobilty, gait instability, impaired balance, decreased upper extremity function, decreased lower extremity function, decreased safety awareness, pain, decreased ROM, impaired coordination, impaired fine motor, impaired skin, edema, orthopedic precautions.  Pt unable to take steps secondary to increased pain when wt shifting to RLE. Continue OT services to address functional goals, progressing as able.      Rehab Prognosis:  good; patient would benefit from acute skilled OT services to address these deficits and reach maximum level of function.       Plan:     Patient to be seen 5 x/week to address the above listed problems via self-care/home management, therapeutic activities, therapeutic exercises  · Plan of Care Expires: 02/18/18  · Plan of Care Reviewed with: patient    This Plan of care has been discussed with the patient who was involved in its development and understands and is in agreement with the identified goals and treatment plan    GOALS:    Occupational Therapy Goals        Problem: Occupational Therapy Goal    Goal Priority Disciplines Outcome Interventions   Occupational Therapy Goal     OT, PT/OT Ongoing (interventions implemented as appropriate)    Description:  Goals to be met by: 2/18/18     Patient will increase functional independence with ADLs by performing:    LE Dressing with Minimal  Assistance.  Grooming while standing with Contact Guard Assistance.  Toileting from bedside commode with Minimal Assistance for hygiene and clothing management.   Supine to sit with Minimal Assistance.  Stand pivot transfers with Minimal Assistance.  Toilet transfer to bedside commode with Minimal Assistance.  Increased functional strength to [facility] for self care skills and functional mobility.  Upper extremity exercise program x10 reps per handout, with independence.                      Time Tracking:     OT Date of Treatment: 01/22/18  OT Start Time: 1003  OT Stop Time: 1033  OT Total Time (min): 30 min    Billable Minutes:Therapeutic Activity 15   *cotx with PT    BELKIS Jaeger  1/22/2018

## 2018-01-22 NOTE — PLAN OF CARE
Problem: Patient Care Overview  Goal: Plan of Care Review  Outcome: Ongoing (interventions implemented as appropriate)  Pt AAOX4. comaplints of pain with movement with mild relief from norco. Pt without N/V/D.  Right arm with splint. Medication given per order. Pt OOb to chair with therapy. Pt turned very two hours. Safety maintained; bed alarm sounding. Will continue to monitor.

## 2018-01-22 NOTE — PROGRESS NOTES
placement efforts continue --  TN/SW note that Tina with [facility] does not think the pt is appropriate for that facility.      Awaiting final evaluation from [facility].

## 2018-01-22 NOTE — PROGRESS NOTES
made phone contact with [facility] admit coordinator, Darleen to check status of referral.  Darleen reported that she does not have an available bed today.  She will review patient's progress and later today to see if patient is a candidate for [facility].

## 2018-01-23 LAB — POCT GLUCOSE: 108 MG/DL (ref 70–110)

## 2018-01-23 PROCEDURE — 97530 THERAPEUTIC ACTIVITIES: CPT

## 2018-01-23 PROCEDURE — 63600175 PHARM REV CODE 636 W HCPCS: Performed by: ORTHOPAEDIC SURGERY

## 2018-01-23 PROCEDURE — 97110 THERAPEUTIC EXERCISES: CPT

## 2018-01-23 PROCEDURE — 97116 GAIT TRAINING THERAPY: CPT

## 2018-01-23 PROCEDURE — 25000003 PHARM REV CODE 250: Performed by: STUDENT IN AN ORGANIZED HEALTH CARE EDUCATION/TRAINING PROGRAM

## 2018-01-23 PROCEDURE — G0378 HOSPITAL OBSERVATION PER HR: HCPCS

## 2018-01-23 PROCEDURE — 94761 N-INVAS EAR/PLS OXIMETRY MLT: CPT

## 2018-01-23 RX ORDER — ASPIRIN 325 MG
325 TABLET, DELAYED RELEASE (ENTERIC COATED) ORAL 2 TIMES DAILY
Qty: 84 TABLET | Refills: 0 | Status: SHIPPED | OUTPATIENT
Start: 2018-01-23 | End: 2018-01-24 | Stop reason: HOSPADM

## 2018-01-23 RX ORDER — AMOXICILLIN 250 MG
1 CAPSULE ORAL DAILY PRN
Status: DISCONTINUED | OUTPATIENT
Start: 2018-01-23 | End: 2018-01-24 | Stop reason: HOSPADM

## 2018-01-23 RX ADMIN — FAMOTIDINE 20 MG: 20 TABLET ORAL at 09:01

## 2018-01-23 RX ADMIN — FAMOTIDINE 20 MG: 20 TABLET ORAL at 08:01

## 2018-01-23 RX ADMIN — HYDROCODONE BITARTRATE AND ACETAMINOPHEN 1 TABLET: 5; 325 TABLET ORAL at 04:01

## 2018-01-23 RX ADMIN — HYDROCODONE BITARTRATE AND ACETAMINOPHEN 1 TABLET: 5; 325 TABLET ORAL at 03:01

## 2018-01-23 RX ADMIN — ENOXAPARIN SODIUM 40 MG: 100 INJECTION SUBCUTANEOUS at 04:01

## 2018-01-23 RX ADMIN — HYDROCODONE BITARTRATE AND ACETAMINOPHEN 1 TABLET: 5; 325 TABLET ORAL at 09:01

## 2018-01-23 RX ADMIN — PRAVASTATIN SODIUM 10 MG: 10 TABLET ORAL at 09:01

## 2018-01-23 RX ADMIN — HYDROCODONE BITARTRATE AND ACETAMINOPHEN 1 TABLET: 5; 325 TABLET ORAL at 08:01

## 2018-01-23 RX ADMIN — AMLODIPINE BESYLATE 10 MG: 5 TABLET ORAL at 09:01

## 2018-01-23 NOTE — PROGRESS NOTES
received phone call from [facility] admit coordinator, Mino stating patient is accepted for admission pending insurance authorization.  Mino reported he will submit for insurance authorization today.     made phone contact with patient's son, Dr. Linda Gonzalez [phone] and informed him patient has been accepted for inpatient rehab at [facility].  Dr. Gonzalez informed that [facility] does not have available beds. Dr. Gonzalez reported that he feels [facility] is appropriate for his mother.  Dr. Gonzalez request to be informed regarding status of [employer] authorization because he a [employer] physician and maybe able assist with obtaining authorization.     made phone contact Human , Linda Hathaway [phone] and informed her regarding rehab need.  Linda informed that patient has been accept by [facility] pending authorization.  Linda informed that patient's son is a [employer] physician and wants to be notified regarding status of authorization.   gave her son name and contact number.  Linda said she will give Dr. Linda Gonzalez's name and number [phone] to the physician that will be reviewing request for inpatient rehab at [facility].

## 2018-01-23 NOTE — PLAN OF CARE
Problem: Occupational Therapy Goal  Goal: Occupational Therapy Goal  Goals to be met by: 2/18/18     Patient will increase functional independence with ADLs by performing:    LE Dressing with Minimal Assistance.  Grooming while standing with Contact Guard Assistance.  Toileting from bedside commode with Minimal Assistance for hygiene and clothing management.   Supine to sit with Minimal Assistance.  Stand pivot transfers with Minimal Assistance.  Toilet transfer to bedside commode with Minimal Assistance.  Increased functional strength to WFL for self care skills and functional mobility.  Upper extremity exercise program x10 reps per handout, with independence.     Outcome: Ongoing (interventions implemented as appropriate)  Roberto Gonzalez is a 72 y.o. female with a medical diagnosis of Trauma.  She presents with R hip pain and orthopedic precautions and decreased overall strength, endurance, balance and Whipple and safety with ADL's and fx mobility. Performance deficits affecting function are weakness, impaired endurance, impaired self care skills, impaired functional mobilty, gait instability, impaired balance, decreased upper extremity function, decreased lower extremity function, decreased safety awareness, pain, orthopedic precautions.  Pt able to take 4 steps today with Mod A x 2 using PRW.  She reports no pain in RUE and is actively moving fingers and shld. Continue OT services to address functional goals, progressing as able.    NATALIE Jaeger/L

## 2018-01-23 NOTE — PROGRESS NOTES
Interval:   NAEO. Voiding and BM yesterday   admitted for aphasia overnight, recent subdural evacuation at [facility]  Patient in good spirits considering circumstances.  Placement efforts ongoing    Vitals:  Temp:  [98.2 °F (36.8 °C)-99.1 °F (37.3 °C)] 98.7 °F (37.1 °C)  Pulse:  [77-96] 83  Resp:  [16-18] 18  SpO2:  [91 %-94 %] 93 %  BP: (111-130)/(56-61) 115/58    Scheduled Meds:    amLODIPine  10 mg Oral Daily    enoxaparin  40 mg Subcutaneous Daily    famotidine  20 mg Oral BID    fluticasone  2 spray Each Nare Daily    pravastatin  10 mg Oral Daily     Continuous Infusions:   PRN Meds: acetaminophen, diphenhydrAMINE, hydrocodone-acetaminophen 5-325mg, ondansetron, promethazine (PHENERGAN) IVPB, ramelteon, sodium chloride 0.9%    Diet: Diet Adult Regular    Trended Lab Data:    Recent Labs  Lab 01/19/18  0905 01/20/18  0953 01/22/18  1036   WBC 6.48 5.73 7.97   HGB 12.0 11.3* 11.2*   HCT 36.5* 34.5* 33.2*    186 227   MCV 93 93 92   RDW 12.7 12.4 12.4    136 136   K 3.5 3.9 3.8    104 102   CO2 29 26 27   BUN 13 11 15   CREATININE 0.8 0.7 0.8   * 149* 108   PROT 6.5 6.2 6.6   ALBUMIN 3.2* 2.9* 2.9*   BILITOT 0.6 0.4 0.7   AST 18 17 19   ALKPHOS 62 59 63   ALT 17 16 18       I/O last 3 completed shifts:  In: 850 [P.O.:850]  Out: 2316 [Urine:2316]    Exam:  Unchanged  RUE  Splint in place  No abrasion to posterior elbow at splint  Mild swelling to all digits  Able to move all fingers without pain to stretch  LTS intact  Motor intact  BCR all digits     RLE  Mild pain with log roll  LTS intact   Motor intact  2+ DP    Impression:  72F with R pubic rami fx and R DR fx     Plan:  PT, case management aiding in planning  Platform WB RUE  WBAT BLE  Regular diet   DVT ppx - mech and chemical  Pepcid

## 2018-01-23 NOTE — PLAN OF CARE
Problem: Fall Risk (Adult)  Goal: Identify Related Risk Factors and Signs and Symptoms  Related risk factors and signs and symptoms are identified upon initiation of [facility] [employer] Clinical Practice Guideline (CPG)   Patient remains free of falls throughout shift. Call light within reach.     Comments: Patient AAOx3. Respirations even and unlabored. Patient reports pain with turning- refuses to turn every 2 hours. Patient medicated throughout shift as needed- verbalized relief with pain medicines. Call light within reach.

## 2018-01-23 NOTE — PROGRESS NOTES
made phone contact with [facility] , Linda to check status of referral.  Linda reported that referral is pending [facility] physician review.  Linda said she will follow up with  in the am.     made phone contact with son, Linda Gonzalez and updated him regarding status of rehab admission.  Son informed that rehab admission is pending Human's physician review.

## 2018-01-23 NOTE — PT/OT/SLP PROGRESS
Physical Therapy Treatment    Patient Name:  Roberto Gonzalez   MRN:  [id number]    Recommendations:     Discharge Recommendations:  rehabilitation facility   Discharge Equipment Recommendations:  (defer to [facility])   Barriers to discharge: Decreased caregiver support    Assessment:     Roberto Gonzalez is a 72 y.o. female admitted with a medical diagnosis of Trauma.  She presents with the following impairments/functional limitations:  weakness, impaired endurance, impaired functional mobilty, gait instability, impaired balance, decreased upper extremity function, decreased lower extremity function, pain, decreased ROM, impaired coordination, orthopedic precautions pt able to take a few steps today with PRW and A X 2,improving status and will benefit from rehab services.    Rehab Prognosis:  Good; patient would benefit from acute skilled PT services to address these deficits and reach maximum level of function.      Recent Surgery: * No surgery found *      Plan:     During this hospitalization, patient to be seen BID (M-F) to address the above listed problems via gait training, therapeutic activities, therapeutic exercises  · Plan of Care Expires:  02/16/18   Plan of Care Reviewed with: patient    Subjective     Communicated with nsg prior to session.  Patient found supine upon PT entry to room, agreeable to treatment.      Chief Complaint: R hip/groin pain with WB  Patient comments/goals: pt pleased with progress of steps today  Pain/Comfort:  · Pain Rating 1:  (no values given)  · Location - Side 1: Right  · Location - Orientation 1: generalized  · Location 1: hip (with WB)  · Pain Addressed 1: Pre-medicate for activity, Reposition, Distraction    Patients cultural, spiritual, Congregational conflicts given the current situation:      Objective:     Patient found with: telemetry     General Precautions: Standard, fall   Orthopedic Precautions:RUE non weight bearing, RLE weight bearing as tolerated   Braces:  (R arm cast)      Functional Mobility:  · Bed Mobility:     · Supine to Sit: maximal assistance  · Transfers:     · Sit to Stand:  minimum assistance, moderate assistance and of 2 persons with platform walker  · Bed to Chair: minimum assistance, moderate assistance and of 2 persons with  HHA  using  Stand Pivot  · Gait: took 3-4 steps with PRW and Min/Mod A X 2 for safety with initial assistance for R le advance   · Balance: fair sitting balance      AM-PAC 6 CLICK MOBILITY  Turning over in bed (including adjusting bedclothes, sheets and blankets)?: 2  Sitting down on and standing up from a chair with arms (e.g., wheelchair, bedside commode, etc.): 2  Moving from lying on back to sitting on the side of the bed?: 2  Moving to and from a bed to a chair (including a wheelchair)?: 2  Need to walk in hospital room?: 2  Climbing 3-5 steps with a railing?: 1  Total Score: 11       Therapeutic Activities and Exercises: seated ap,laq and hip flexion X 10 reps       Patient left up in chair with all lines intact, call button in reach and nsg notified..    GOALS: see general POC   Physical Therapy Goals        Problem: Physical Therapy Goal    Goal Priority Disciplines Outcome Goal Variances Interventions   Physical Therapy Goal     PT/OT, PT Ongoing (interventions implemented as appropriate)     Description:  Goals to be met by: 2018     Patient will increase functional independence with mobility by performin. Supine to sit with Stand-by Assistance  2. Sit to stand transfer with Stand-by Assistance  3. Bed to chair transfer with Contact Guard Assistance using Rolling Walker with platform  4. Gait  x 25 feet with Contact Guard Assistance using Rolling Walker with platform.                       Time Tracking:     PT Received On: 18  PT Start Time: 930     PT Stop Time: 1000  PT Total Time (min): 30 min     Billable Minutes: Gait Training 15 and Total Time 30    Treatment Type: Treatment  PT/PTA: PTA     PTA Visit Number:  3     Shawn Lewis, PTA  01/23/2018

## 2018-01-23 NOTE — NURSING
Report receicved from MALENA Webber. Care assumed. Patient lying in bed on a telephone conversation. NAD. Respirations even and unlabored. Patient instructed in plan of care including fall risk precautions, voiced understanding. Call bell within reach. Will continue to monitor.

## 2018-01-23 NOTE — PT/OT/SLP PROGRESS
Occupational Therapy   Treatment    Name: Roberto Gonzalez  MRN: [id number]  Admitting Diagnosis:  Trauma       Recommendations:     Discharge Recommendations: rehabilitation facility  Discharge Equipment Recommendations:   (TBD)  Barriers to discharge:  Inaccessible home environment, Decreased caregiver support    Subjective     Communicated with: RN prior to session.  Pain/Comfort:  · Pain Rating 1:  (pt reports pain but did not rate)  · Location - Side 1: Right  · Location - Orientation 1: generalized  · Location 1: hip  · Pain Addressed 1: Distraction, Pre-medicate for activity, Reposition    Patients cultural, spiritual, Buddhism conflicts given the current situation:      Objective:     Patient found with: telemetry    General Precautions: Standard, fall   Orthopedic Precautions:RUE non weight bearing, RLE weight bearing as tolerated   Braces:  (R arm cast 2/2 DR rodriguez)     Occupational Performance:    Bed Mobility:    · Patient completed Rolling/Turning to Left with  contact guard assistance and with side rail  · Patient completed Scooting/Bridging with moderate assistance and scoot seated to EOB  · Patient completed Supine to Sit with maximal assistance, with side rail and increased effort     Functional Mobility/Transfers:  · Patient completed Sit <> Stand Transfer with minimum assistance, of 2 persons and height of bed slightly elevated  with  platform walker   · Patient completed Bed <> Chair Transfer using Stand Pivot technique with minimum assistance of 2 persons for safety with no assistive device  · Functional Mobility: Pt took 4 steps with Mod A x 2 using Platform RW.  Pt with difficulty wt shifting to R side and difficulty picking up B feet to advance forward. See PT notes for details.    Activities of Daily Living:  · Declined all ADL's    Patient left up in chair with all lines intact, call button in reach and RN notified    AMPA 6 Click:  AMPAC Total Score: 15    Treatment & Education:  Pt performed R  shld AROM for flex and abd x 10 reps and finger AROM for gross grasp, finger spreads, and thumb circles x 10 reps.  Tolerated well with no complaints.   Sit<>stand with Min A x 2, 2 trials.  Pt tolerated ~3 min static stand first trial with PRW, and took steps second trial.  EOB static sitting balance normal.  Education:    Assessment:     Roberto Gonzalez is a 72 y.o. female with a medical diagnosis of Trauma.  She presents with R hip pain and orthopedic precautions and decreased overall strength, endurance, balance and Ramsay and safety with ADL's and fx mobility.   Performance deficits affecting function are weakness, impaired endurance, impaired self care skills, impaired functional mobilty, gait instability, impaired balance, decreased upper extremity function, decreased lower extremity function, decreased safety awareness, pain, orthopedic precautions.  Pt able to take 4 steps today with Mod A x 2 using PW.  She reports no pain in RUE and is actively moving fingers and shld. Continue OT services to address functional goals, progressing as able.      Rehab Prognosis:  good; patient would benefit from acute skilled OT services to address these deficits and reach maximum level of function.       Plan:     Patient to be seen 5 x/week to address the above listed problems via self-care/home management, therapeutic activities, therapeutic exercises  · Plan of Care Expires: 02/18/18  · Plan of Care Reviewed with: patient    This Plan of care has been discussed with the patient who was involved in its development and understands and is in agreement with the identified goals and treatment plan    GOALS:    Occupational Therapy Goals        Problem: Occupational Therapy Goal    Goal Priority Disciplines Outcome Interventions   Occupational Therapy Goal     OT, PT/OT Ongoing (interventions implemented as appropriate)    Description:  Goals to be met by: 2/18/18     Patient will increase functional independence with  ADLs by performing:    LE Dressing with Minimal Assistance.  Grooming while standing with Contact Guard Assistance.  Toileting from bedside commode with Minimal Assistance for hygiene and clothing management.   Supine to sit with Minimal Assistance.  Stand pivot transfers with Minimal Assistance.  Toilet transfer to bedside commode with Minimal Assistance.  Increased functional strength to WFL for self care skills and functional mobility.  Upper extremity exercise program x10 reps per handout, with independence.                      Time Tracking:     OT Date of Treatment: 01/23/18  OT Start Time: 0930  OT Stop Time: 1000  OT Total Time (min): 30 min    Billable Minutes:Therapeutic Activity 15   *cotx with PTA    BELKIS Jaeger  1/23/2018

## 2018-01-23 NOTE — PT/OT/SLP PROGRESS
Physical Therapy Treatment    Patient Name:  Roberto Gonzalez   MRN:  [id number]    Recommendations:     Discharge Recommendations:  rehabilitation facility   Discharge Equipment Recommendations:  (defer to IPR)   Barriers to discharge: Decreased caregiver support    Assessment:     Roberto Gonzalez is a 72 y.o. female admitted with a medical diagnosis of Trauma.  She presents with the following impairments/functional limitations:  weakness, impaired endurance, impaired functional mobilty, gait instability, impaired balance, decreased upper extremity function, decreased lower extremity function, pain, decreased ROM, impaired coordination, orthopedic precautions increased sitting tolerance and improving mobility,pt appears ready for rehab transfer.    Rehab Prognosis:  Good; patient would benefit from acute skilled PT services to address these deficits and reach maximum level of function.      Recent Surgery: * No surgery found *      Plan:     During this hospitalization, patient to be seen BID (M-F) to address the above listed problems via gait training, therapeutic activities, therapeutic exercises  · Plan of Care Expires:  02/16/18   Plan of Care Reviewed with: patient    Subjective     Communicated with nsg prior to session.  Patient found up in chair upon PT entry to room, agreeable to treatment.      Chief Complaint: R groin pain  Patient comments/goals: pt is pleased with daily progression of activities  Pain/Comfort:  · Pain Rating 1:  (no rating)  · Location - Side 1: Right  · Location - Orientation 1: generalized  · Location 1: groin  · Pain Addressed 1: Reposition, Distraction    Patients cultural, spiritual, Holiness conflicts given the current situation:      Objective:     Patient found with: telemetry     General Precautions: Standard, fall   Orthopedic Precautions:RUE non weight bearing, RLE weight bearing as tolerated   Braces:  (R arm cast)     Functional Mobility:  · Bed Mobility:     · Sit to Supine:  moderate assistance, maximal assistance and of 2 persons  · Transfers:     · Sit to Stand:  moderate assistance and of 2 persons with HHA  · Bed to Chair: moderate assistance and of 2 persons with  HHA  using  Stand Pivot  · Toilet Transfer: moderate assistance and of 2 persons with  HHA  using  Stand Pivot  · Gait: pivot steps with t/f,Mod A X 2  · Balance: fair sitting balance      AM-PAC 6 CLICK MOBILITY  Turning over in bed (including adjusting bedclothes, sheets and blankets)?: 2  Sitting down on and standing up from a chair with arms (e.g., wheelchair, bedside commode, etc.): 2  Moving from lying on back to sitting on the side of the bed?: 2  Moving to and from a bed to a chair (including a wheelchair)?: 2  Need to walk in hospital room?: 2  Climbing 3-5 steps with a railing?: 1  Total Score: 11       Therapeutic Activities and Exercises: le supine ex's X 15 reps inc: ap,qs,hs,abd/add,slr with assistance on R       Patient left supine with all lines intact, call button in reach, bed alarm on and nsg notified..    GOALS: see general POC   Physical Therapy Goals        Problem: Physical Therapy Goal    Goal Priority Disciplines Outcome Goal Variances Interventions   Physical Therapy Goal     PT/OT, PT Ongoing (interventions implemented as appropriate)     Description:  Goals to be met by: 2018     Patient will increase functional independence with mobility by performin. Supine to sit with Stand-by Assistance  2. Sit to stand transfer with Stand-by Assistance  3. Bed to chair transfer with Contact Guard Assistance using Rolling Walker with platform  4. Gait  x 25 feet with Contact Guard Assistance using Rolling Walker with platform.                       Time Tracking:     PT Received On: 18  PT Start Time: 1302     PT Stop Time: 1330  PT Total Time (min): 28 min     Billable Minutes: Therapeutic Activity 17 and Therapeutic Exercise 11    Treatment Type: Treatment  PT/PTA: PTA     PTA Visit  Number: 3     Shawn Lewis, PTA  01/23/2018

## 2018-01-24 VITALS
RESPIRATION RATE: 18 BRPM | DIASTOLIC BLOOD PRESSURE: 62 MMHG | BODY MASS INDEX: 29.75 KG/M2 | TEMPERATURE: 99 F | SYSTOLIC BLOOD PRESSURE: 129 MMHG | HEIGHT: 65 IN | HEART RATE: 85 BPM | WEIGHT: 178.56 LBS | OXYGEN SATURATION: 94 %

## 2018-01-24 PROCEDURE — G0378 HOSPITAL OBSERVATION PER HR: HCPCS

## 2018-01-24 PROCEDURE — 97535 SELF CARE MNGMENT TRAINING: CPT | Mod: 59

## 2018-01-24 PROCEDURE — 25000003 PHARM REV CODE 250: Performed by: STUDENT IN AN ORGANIZED HEALTH CARE EDUCATION/TRAINING PROGRAM

## 2018-01-24 PROCEDURE — 97116 GAIT TRAINING THERAPY: CPT | Mod: 59

## 2018-01-24 PROCEDURE — 97110 THERAPEUTIC EXERCISES: CPT

## 2018-01-24 PROCEDURE — G8989 SELF CARE D/C STATUS: HCPCS | Mod: CK

## 2018-01-24 PROCEDURE — 94761 N-INVAS EAR/PLS OXIMETRY MLT: CPT

## 2018-01-24 PROCEDURE — G8988 SELF CARE GOAL STATUS: HCPCS | Mod: CK

## 2018-01-24 PROCEDURE — 97530 THERAPEUTIC ACTIVITIES: CPT

## 2018-01-24 PROCEDURE — 63600175 PHARM REV CODE 636 W HCPCS: Performed by: ORTHOPAEDIC SURGERY

## 2018-01-24 PROCEDURE — 86580 TB INTRADERMAL TEST: CPT | Performed by: ORTHOPAEDIC SURGERY

## 2018-01-24 RX ORDER — ENOXAPARIN SODIUM 100 MG/ML
40 INJECTION SUBCUTANEOUS DAILY
Qty: 5.6 ML | Refills: 0 | OUTPATIENT
Start: 2018-01-25 | End: 2018-02-08

## 2018-01-24 RX ORDER — ASPIRIN 325 MG
325 TABLET, DELAYED RELEASE (ENTERIC COATED) ORAL 2 TIMES DAILY
Qty: 56 TABLET | Refills: 0 | OUTPATIENT
Start: 2018-02-09 | End: 2019-07-30

## 2018-01-24 RX ADMIN — HYDROCODONE BITARTRATE AND ACETAMINOPHEN 1 TABLET: 5; 325 TABLET ORAL at 03:01

## 2018-01-24 RX ADMIN — ENOXAPARIN SODIUM 40 MG: 100 INJECTION SUBCUTANEOUS at 05:01

## 2018-01-24 RX ADMIN — FLUTICASONE PROPIONATE 100 MCG: 50 SPRAY, METERED NASAL at 09:01

## 2018-01-24 RX ADMIN — HYDROCODONE BITARTRATE AND ACETAMINOPHEN 1 TABLET: 5; 325 TABLET ORAL at 09:01

## 2018-01-24 RX ADMIN — AMLODIPINE BESYLATE 10 MG: 5 TABLET ORAL at 09:01

## 2018-01-24 RX ADMIN — TUBERCULIN PURIFIED PROTEIN DERIVATIVE 5 UNITS: 5 INJECTION, SOLUTION INTRADERMAL at 03:01

## 2018-01-24 RX ADMIN — PRAVASTATIN SODIUM 10 MG: 10 TABLET ORAL at 09:01

## 2018-01-24 RX ADMIN — FAMOTIDINE 20 MG: 20 TABLET ORAL at 09:01

## 2018-01-24 NOTE — NURSING
Pt is discharge to [facility].  Report called and given to Nurse Reynolds.  Pt in stable condition.

## 2018-01-24 NOTE — NURSING
CASE MANAGEMENT UPDATE  PATIENT REMAINS IN OBSERVATION. RECEIVED DENIAL OF MEDICAL COVERAGE FOR REHAB PLACEMENT FROM [facility]     SPOKE TO PATIENT'S SON -[phone]  AND INFORMED HIM OF THE DENIAL AND  SPOKE TO DR. SOTO.    AURELIO HARVEY AND MICKI WILL NOTIFY PATIENT . COPY OF THE LETTER WILL BE PLACED IN THE PATIENT CHART. DR. SOTO WILL CALL SON TO DISCUSS FACILITY PLACEMENT OR HOME WITH THE SON.

## 2018-01-24 NOTE — PLAN OF CARE
[facility]    FACILITY TRANSFER ORDERS      Patient Name: Roberto Gonzalez  YOB: 1945    PCP: Esau Butler MD   PCP Address: [address] / [place] [address]  PCP Phone Number: [phone]  PCP Fax: [phone]    Encounter Date: 01/24/2018    Admit to: [facility]    Vital Signs:  Routine    Diagnoses:   Active Hospital Problems    Diagnosis  POA    *Trauma [T14.90XA]  Yes    Closed fracture of right wrist [S62.101A]  Yes     2/2 fall  Closed reduction and split done  Tolerating well  PT/OT on board      GERD (gastroesophageal reflux disease) [K21.9]  Yes    Fracture, Colles, right, closed [S52.531A]  Yes    Closed nondisplaced fracture of anterior wall of right acetabulum [S32.414A]  Yes    Postoperative urinary retention [N99.89, R33.8]  No    Benign essential HTN [I10]  Yes    Hyperlipidemia [E78.5]  Yes      Resolved Hospital Problems    Diagnosis Date Resolved POA   No resolved problems to display.       Allergies:Review of patient's allergies indicates:  No Known Allergies    Diet: regular diet    Activities: Activity as tolerated, Commode at bedside, Up with assistance and Weight bearing as tolerated    Nursing: WBAT     Labs: none      CONSULTS:    Physical Therapy to evaluate and treat. , Occupational Therapy to evaluate and treat. and  to evaluate for community resources/long-range planning.    MISCELLANEOUS CARE:  none    WOUND CARE ORDERS  None    Medications: Review discharge medications with patient and family and provide education.      Current Discharge Medication List      START taking these medications    Details   enoxaparin (LOVENOX) 40 mg/0.4 mL Syrg Inject 0.4 mLs (40 mg total) into the skin once daily.  Qty: 5.6 mL, Refills: 0, stop date 2/9/17      famotidine (PEPCID) 20 MG tablet Take 1 tablet (20 mg total) by mouth 2 (two) times daily.  Qty: 84 tablet, Refills: 0      oxyCODONE-acetaminophen (PERCOCET) 5-325  mg per tablet Take 1 tablet by mouth every 4 (four) hours as needed for Pain.  Qty: 11 tablet, Refills: 0     Aspirin 325                                             Take 1 tablet BID for 28 days after stopping lovenox, begin on 2/9/18      CONTINUE these medications which have NOT CHANGED    Details   amlodipine (NORVASC) 10 MG tablet TAKE 1 TABLET (10 MG TOTAL) BY MOUTH ONCE DAILY.  Qty: 30 tablet, Refills: 10      fluticasone (FLONASE) 50 mcg/actuation nasal spray 2 sprays by Each Nare route once daily.  Qty: 16 g, Refills: 2      hydrochlorothiazide (HYDRODIURIL) 25 MG tablet Take 1 tablet (25 mg total) by mouth once daily.  Qty: 30 tablet, Refills: 11      multivitamin (THERAGRAN) per tablet Take 1 tablet by mouth once daily.      pravastatin (PRAVACHOL) 10 MG tablet TAKE 1 TABLET (10 MG TOTAL) BY MOUTH ONCE DAILY.  Qty: 90 tablet, Refills: 3         STOP taking these medications       aspirin 81 MG Chew Comments:   Reason for Stopping:         ranitidine (ZANTAC) 150 MG tablet Comments:   Reason for Stopping:                  _________________________________  Will Colon MD  01/24/2018    I agree with findings outlined by the resident.

## 2018-01-24 NOTE — PT/OT/SLP PROGRESS
Physical Therapy Treatment    Patient Name:  Roberto Gonzalez   MRN:  [id number]    Recommendations:     Discharge Recommendations:  nursing facility, skilled (pt denied rehab per )   Discharge Equipment Recommendations:  (defer to SNF)   Barriers to discharge: Decreased caregiver support    Assessment:     Roberto Gonzalez is a 72 y.o. female admitted with a medical diagnosis of Trauma.  She presents with the following impairments/functional limitations:  weakness, impaired endurance, impaired functional mobilty, gait instability, impaired balance, decreased upper extremity function, decreased lower extremity function, pain, decreased ROM, impaired coordination, orthopedic precautions pt with continued participation and remains with R groin pain,pt will benefit from SNF upon denial of out pt rehab.    Rehab Prognosis:  Good; patient would benefit from acute skilled PT services to address these deficits and reach maximum level of function.      Recent Surgery: * No surgery found *      Plan:     During this hospitalization, patient to be seen BID (M-F) to address the above listed problems via gait training, therapeutic activities, therapeutic exercises  · Plan of Care Expires:  02/16/18   Plan of Care Reviewed with: patient    Subjective     Communicated with nsg prior to session.  Patient found up in chair upon PT entry to room, agreeable to treatment.      Chief Complaint: R groin pain  Patient comments/goals: pt disappointed about in pt rehab being denied by insurance  Pain/Comfort:  · Pain Rating 1:  (no rating)  · Location - Side 1: Right  · Location - Orientation 1: generalized  · Location 1: groin  · Pain Addressed 1: Reposition, Distraction, Nurse notified    Patients cultural, spiritual, Uatsdin conflicts given the current situation:      Objective:     Patient found with: telemetry     General Precautions: Standard, fall   Orthopedic Precautions:RUE non weight bearing, RLE weight bearing as tolerated    Braces:  (R arm cast)     Functional Mobility:  · Bed Mobility:     · Sit to Supine: moderate assistance and of 2 persons  · Transfers:     · Sit to Stand:  moderate assistance with platform walker  · Bed to Chair: moderate assistance with  platform walker  using  Step Transfer  · Gait: amb ~3-4 steps with PRW and Mod A  · Balance: good sitting balance      AM-PAC 6 CLICK MOBILITY  Turning over in bed (including adjusting bedclothes, sheets and blankets)?: 3  Sitting down on and standing up from a chair with arms (e.g., wheelchair, bedside commode, etc.): 2  Moving from lying on back to sitting on the side of the bed?: 3  Moving to and from a bed to a chair (including a wheelchair)?: 2  Need to walk in hospital room?: 2  Climbing 3-5 steps with a railing?: 1  Total Score: 13       Therapeutic Activities and Exercises: le supine ex's X 15 reps inc: ap,qs,hs,abd/add,slr with assistance on R,pt required increased standing time with transfer as well as time EOB       Patient left supine with all lines intact, call button in reach and nsg notified..    GOALS: see general POC   Physical Therapy Goals        Problem: Physical Therapy Goal    Goal Priority Disciplines Outcome Goal Variances Interventions   Physical Therapy Goal     PT/OT, PT Ongoing (interventions implemented as appropriate)     Description:  Goals to be met by: 2018     Patient will increase functional independence with mobility by performin. Supine to sit with Stand-by Assistance  2. Sit to stand transfer with Stand-by Assistance  3. Bed to chair transfer with Contact Guard Assistance using Rolling Walker with platform  4. Gait  x 25 feet with Contact Guard Assistance using Rolling Walker with platform.                       Time Tracking:     PT Received On: 18  PT Start Time: 1227     PT Stop Time: 1307  PT Total Time (min): 40 min     Billable Minutes: Gait Training 15, Therapeutic Activity 13 and Therapeutic Exercise  12    Treatment Type: Treatment  PT/PTA: PTA     PTA Visit Number: 4     Shawn Lewis, PTA  01/24/2018

## 2018-01-24 NOTE — PT/OT/SLP PROGRESS
Occupational Therapy   Treatment    Name: Roberto Gonzalez  MRN: [id number]  Admitting Diagnosis:  Trauma       Recommendations:     Discharge Recommendations: rehabilitation facility (Should patient be denied IPR, will need SNF. Patient has not truly ambulated and remains a fall risk.)  Discharge Equipment Recommendations:   (Defer to next level of care)  Barriers to discharge:  Inaccessible home environment, Decreased caregiver support    Subjective     Communicated with: nurseSofía prior to session.  Pain/Comfort:  · Pain Rating 1: 0/10    Patients cultural, spiritual, Muslim conflicts given the current situation:  (none reported)    Objective:     Patient found with: telemetry    General Precautions: Standard, fall   Orthopedic Precautions:RUE non weight bearing, RLE weight bearing as tolerated   Braces:  (RUE cast )     Bed Mobility:    · Patient completed Scooting/Bridging with stand by assistance and increased time  · Patient completed Supine to Sit with stand by assistance and increased time     Functional Mobility/Transfers:  · Patient completed Sit <> Stand Transfer with minimum assistance and of 2 persons  with  platform walker   · Patient completed Bed <> Chair Transfer using Step Transfer technique with minimum assistance and of 2 persons with platform walker    Activities of Daily Living:  · Grooming: stand by assistance from bedside chair  · UB Dressing: contact guard assistance to mandeep gown shirt    Patient left up in chair with all lines intact, call button in reach and RN notified    AMPA 6 Click:  AMPAC Total Score: 15    Treatment & Education:  Patient with improved bed mobility, but did require increased time to get EOB. See PT note for gait details. In bedside chair, patient completed oral care and facial hygiene with set-up and increased time to manipulate items. Patient able to complete RUE AROM, 1 x 10 reps of sh flexion and digit flexion.  Reported only very minor pain during thumb  extension.   Education:    Assessment:   Patient with improved bed mobility and activity tolerance. Still unable to  B feet during gait. Only able to shuffle 4-5 steps to bedside chair.  Patient remains appropriate for post-acute therapy placement (IPR, or SNF if denied rehab). Patient is a fall risk should she go home.    Roberto Gonzalez is a 72 y.o. female with a medical diagnosis of Trauma.  Performance deficits affecting function are weakness, impaired endurance, impaired self care skills, impaired functional mobilty, gait instability, impaired balance, pain, orthopedic precautions, decreased lower extremity function, decreased upper extremity function, decreased ROM, impaired coordination.      Rehab Prognosis:  Good; patient would benefit from acute skilled OT services to address these deficits and reach maximum level of function.       Plan:     Patient to be seen 5 x/week to address the above listed problems via self-care/home management, therapeutic activities, therapeutic exercises  · Plan of Care Expires:  (2/18/18)  · Plan of Care Reviewed with: patient    This Plan of care has been discussed with the patient who was involved in its development and understands and is in agreement with the identified goals and treatment plan    GOALS:    Occupational Therapy Goals        Problem: Occupational Therapy Goal    Goal Priority Disciplines Outcome Interventions   Occupational Therapy Goal     OT, PT/OT Ongoing (interventions implemented as appropriate)    Description:  Goals to be met by: 2/18/18     Patient will increase functional independence with ADLs by performing:    LE Dressing with Minimal Assistance.  Grooming while standing with Contact Guard Assistance.  Toileting from bedside commode with Minimal Assistance for hygiene and clothing management.   Supine to sit with Minimal Assistance. --MET 1/24  Stand pivot transfers with Minimal Assistance.  Toilet transfer to bedside commode with Minimal  Assistance.  Increased functional strength to WFL for self care skills and functional mobility.  Upper extremity exercise program x10 reps per handout, with independence.                       Time Tracking:     OT Date of Treatment: 01/24/18  OT Start Time: 1003  OT Stop Time: 1045  OT Total Time (min): 42 min    Billable Minutes:Self Care/Home Management 12  Therapeutic Activity 12    BELKIS Valentin  1/24/2018

## 2018-01-24 NOTE — PLAN OF CARE
Problem: Patient Care Overview  Goal: Plan of Care Review  Reviewed plan of care with pt.  Patient verbalized understanding.  Patient denied any present complaints of discomforts to rt. Arm and rt. Hip.  Extremities warm to touch, positive sensation and movement.  Will monitor pt. Throughout shift.

## 2018-01-24 NOTE — PROGRESS NOTES
Per [facility] admit coordinator, Maci patient is accepted for admission today pending insurance authorization.  Maci reported that she will submit for insurance authorization today.     made phone contact with son, Dr. Linda Gonzalez and informed him patient has been accepted for skilled nursing facility placement at [facility].  Son agreed with admission to [facility] skilled nursing facility.

## 2018-01-24 NOTE — PROGRESS NOTES
faxed transfer facility orders and Percocet prescription to [facility] admit coordinatorMaci to review for admission today.       received phone call from [facility] , Linda stating she has given authorization for skilled nursing facility placement at [facility].       received message from [facility] admit coordinatorMaci giving permission for patient to admit to room 304A and nurse to call report to [facility] at [phone].       attempted to make phone contact with son, Dr. Mckeon Gonzalez to inform him patient is ready for discharge to [facility].  Son was not available left voice message.     met with patient at bedside and informed her she is ready for discharge to [facility] skilled nursing facility.  She confirmed the facility is located on [facility] and agreed with discharge to [facility].     made phone contact with [facility] to arrange ambulance transport for  within the hour.  Dispatcher reported [facility] is having a high volume of calls it will be about 90 minutes before patient can be transported.     informed bedside nurseSofía patient is ready for discharge to SNF.  She can call report to number located on front of ambulance packet and ambulance is scheduled to transport in 90 minutes.

## 2018-01-24 NOTE — PT/OT/SLP PROGRESS
Physical Therapy Treatment    Patient Name:  Roberto Gonzalez   MRN:  [id number]    Recommendations:     Discharge Recommendations:  rehabilitation facility   Discharge Equipment Recommendations:  (defer to IPR)   Barriers to discharge: Decreased caregiver support    Assessment:     Roberto Gonzalez is a 72 y.o. female admitted with a medical diagnosis of Trauma.  She presents with the following impairments/functional limitations:  weakness, impaired endurance, impaired functional mobilty, gait instability, impaired balance, decreased upper extremity function, decreased lower extremity function, decreased ROM, impaired fine motor, orthopedic precautions improving status with all mobility and will benefit from rehab services.    Rehab Prognosis:  Good; patient would benefit from acute skilled PT services to address these deficits and reach maximum level of function.      Recent Surgery: * No surgery found *      Plan:     During this hospitalization, patient to be seen BID (M- F) to address the above listed problems via gait training, therapeutic activities, therapeutic exercises  · Plan of Care Expires:  02/16/18   Plan of Care Reviewed with: patient    Subjective     Communicated with nsg prior to session.  Patient found supine upon PT entry to room, agreeable to treatment.      Chief Complaint: R le WB  Patient comments/goals: pt is motivated and is pleased with progress  Pain/Comfort:  · Pain Rating 1:  (no c/o's)    Patients cultural, spiritual, Church conflicts given the current situation:      Objective:     Patient found with: telemetry     General Precautions: Standard, fall   Orthopedic Precautions:RUE non weight bearing, RLE weight bearing as tolerated   Braces:  (R arm cast)     Functional Mobility:  · Bed Mobility:     · Supine to Sit: stand by assistance and with increased time  · Transfers:     · Sit to Stand:  minimum assistance and of 2 persons with platform walker  · Bed to Chair: minimum assistance  and of 2 persons with  platform walker  using  Step Transfer  · Gait: amb ~4-5 steps with PRW with Min A X 2   · Balance: good sitting balance      AM-PAC 6 CLICK MOBILITY  Turning over in bed (including adjusting bedclothes, sheets and blankets)?: 3  Sitting down on and standing up from a chair with arms (e.g., wheelchair, bedside commode, etc.): 2  Moving from lying on back to sitting on the side of the bed?: 3  Moving to and from a bed to a chair (including a wheelchair)?: 2  Need to walk in hospital room?: 2  Climbing 3-5 steps with a railing?: 1  Total Score: 13       Therapeutic Activities and Exercises: le seated ex's inc: ap,laq and requiring assistance with hip flexion x 10 reps       Patient left up in chair with all lines intact, call button in reach and nsg notified..    GOALS: see general POC   Physical Therapy Goals        Problem: Physical Therapy Goal    Goal Priority Disciplines Outcome Goal Variances Interventions   Physical Therapy Goal     PT/OT, PT Ongoing (interventions implemented as appropriate)     Description:  Goals to be met by: 2018     Patient will increase functional independence with mobility by performin. Supine to sit with Stand-by Assistance  2. Sit to stand transfer with Stand-by Assistance  3. Bed to chair transfer with Contact Guard Assistance using Rolling Walker with platform  4. Gait  x 25 feet with Contact Guard Assistance using Rolling Walker with platform.                       Time Tracking:     PT Received On: 18  PT Start Time: 1003     PT Stop Time: 1045  PT Total Time (min): 42 min     Billable Minutes: Gait Training 18 and Total Time 42    Treatment Type: Treatment  PT/PTA: PTA     PTA Visit Number: 4     Shawn Lewis, PTA  2018

## 2018-01-24 NOTE — PLAN OF CARE
Problem: Occupational Therapy Goal  Goal: Occupational Therapy Goal  Goals to be met by: 2/18/18     Patient will increase functional independence with ADLs by performing:    LE Dressing with Minimal Assistance.  Grooming while standing with Contact Guard Assistance.  Toileting from bedside commode with Minimal Assistance for hygiene and clothing management.   Supine to sit with Minimal Assistance. --MET 1/24  Stand pivot transfers with Minimal Assistance.  Toilet transfer to bedside commode with Minimal Assistance.  Increased functional strength to [facility] for self care skills and functional mobility.  Upper extremity exercise program x10 reps per handout, with independence.     Outcome: Ongoing (interventions implemented as appropriate)  Patient with improved bed mobility and activity tolerance. Still unable to  B feet during gait. Only able to shuffle 4-5 steps to bedside chair.  Patient remains appropriate for post-acute therapy placement (IPR, or SNF if denied rehab). Patient is a fall risk should she go home.

## 2018-01-24 NOTE — NURSING
CASE MANAGEMENT UPDATE  PATIENT REMAINS IN OBSERVATION STATUS. SPOKE TO PATIENT'S SON -[phone] ABOUT DISCHARGE PLAN AND OPTIONS.    1. WE WILL WAIT TO SEE WHAT JOSSELINE SAYS ABOUT REHAB PLACEMENT  2. HE WANTS SKILL AT [facility] OR WILL LOOK AT ONE OF THE OTHER FACILITIES. I CALLED FATOUMATA AT [facility] THE PATIENT IS STILL ON THE LIST BUT SHE STATES SHE WILL NOT HAVE A BED UNTIL Monday.  3. THE PATIENT MAY HAVE TO GO HOME WITH HOME HEALTH AND SITTERS.    WILL CONTINUE TO FOLLOW.

## 2018-01-24 NOTE — PROGRESS NOTES
received phone call from [facility] , Linda Hathaway stating the medical director has denied request for inpatient rehab.  Linda reported a denial letter has been faxed to the hospital.  Also, [facility]'s physician can request to complete a peer to peer review to try and have the denial for rehab overturned.  Linda reported that [facility] will pay for skilled nursing facility admission.   informed Linda that patient's son  Linda Rodriguezdy will want to speak with someone at [facility] regarding the denial.  Linda reported she has reached out to her manager to see who the son can speak with.  She gave permission for son to make contact with her at this time, until she can obtain a  that is in administration.    Per son preference,  sent skilled nursing facility referral via naveal to integrated care providers, [facility], [facility], [facility], and [facility].

## 2018-01-25 NOTE — PROGRESS NOTES
EMS here to get patient. Patient transferred to [facility] with no issues. belongings with patient. Pain pill given, PIV and heart monitor removed by day nurse, darrell

## 2018-01-26 NOTE — NURSING
CASE MANAGEMENT UPDATE  RECEIVED CALL FROM  PATIENT SON   DR MORGAN -[phone] ABOUT HIS MOTHER AND THE THERAPY SHE WAS RECEIVING  AT THE POST ACUTE FACILITY [facility], HE PRECEDED TO COMPLAIN AND SAY HIS MOTHER WAS NOT RECEIVING THERAPY AND HAD ONLY ONE TREATMENT AND HAD A GROUP SESSION ONLY.   HE WOULD LIKE HELP TO TRANSFER HER TO A REHAB  I INSTRUCTED HIM THAT I WOULD REACH OUT THE THE DIRECTOR OF NURSING AND THAT IF HE WANTED TO FILE A COMPLAINT WITH [facility] THAT I CAN GIVE HIM THE INFORMATION OR HE CAN REACH OUT TO THE DIRECTOR OF NURSING TO SEE EXACTLY WHAT IS GOING ON. ALSO THAT ANY CHANGE WOULD HAVE TO BE APPROVED BY HIS MOTHER'S INSURANCE COMPANY [employer]    I SPOKE TO ZULLY THE DIRECTOR OF NURSING AT [facility] AND SHE STATES THE [facility] ARRIVED ON 1/24 AND GOT HER EVALUATION ON 1/25 WITH THERAPY AND HAD THERAPY TODAY ON 1/26, THEY WOULD CALL AND REACH OUT TO HIM.  MIGUEL WITH [facility] REP. NOTIFIED OF ABOVE.

## 2018-01-29 NOTE — PT/OT/SLP DISCHARGE
Occupational Therapy Discharge Summary    Roberto Gonzalez  MRN: [id number]   Principal Problem: Trauma      Patient Discharged from acute Occupational Therapy on 1/24/18.  Please refer to prior OT note dated 1/24/18 for functional status.    Assessment:      Patient appropriate for care in another setting.    Objective:     GOALS:    Occupational Therapy Goals        Problem: Occupational Therapy Goal    Goal Priority Disciplines Outcome Interventions   Occupational Therapy Goal     OT, PT/OT Ongoing (interventions implemented as appropriate)    Description:  Goals to be met by: 2/18/18     Patient will increase functional independence with ADLs by performing:    LE Dressing with Minimal Assistance.  Grooming while standing with Contact Guard Assistance.  Toileting from bedside commode with Minimal Assistance for hygiene and clothing management.   Supine to sit with Minimal Assistance. --MET 1/24  Stand pivot transfers with Minimal Assistance.  Toilet transfer to bedside commode with Minimal Assistance.  Increased functional strength to WFL for self care skills and functional mobility.  Upper extremity exercise program x10 reps per handout, with independence.                       Reasons for Discontinuation of Therapy Services  Transfer to alternate level of care.      Plan:     Patient Discharged to: SNF. Recommended IP rehab     Delores Watson, OT  1/29/2018

## 2018-02-02 ENCOUNTER — TELEPHONE (OUTPATIENT)
Dept: FAMILY MEDICINE | Facility: CLINIC | Age: 73
End: 2018-02-02

## 2018-02-02 NOTE — TELEPHONE ENCOUNTER
----- Message from Thais Dash sent at 2/2/2018  9:40 AM CST -----  Contact: Ania martinez/ [facility] [phone]  Ania is calling to confirm that Dr. Butler will oversee the patient's home health. Please call and advise.

## 2018-02-06 ENCOUNTER — TELEPHONE (OUTPATIENT)
Dept: FAMILY MEDICINE | Facility: CLINIC | Age: 73
End: 2018-02-06

## 2018-02-06 NOTE — TELEPHONE ENCOUNTER
Faxed to number provided.  Called Mara to let her know that I faxed it to [phone].  She then told me that was the [facility] and I needed to fax it to the [facility].  Gave me fax number [phone].  Advised I would refax but that was the original fax that I faxed to on 2/5 with a confirmation time of 5:12pm.  Refaxed to original number.

## 2018-02-06 NOTE — TELEPHONE ENCOUNTER
----- Message from Tianna Starkey sent at 2/6/2018 12:15 PM CST -----  Contact: Mara/Blake/[phone]  Mara mckay is requesting a call back from [employer]. Fax # is [phone]  Please advise.

## 2018-02-06 NOTE — TELEPHONE ENCOUNTER
Left message requesting a callback to confirm fax number.  Paperwork was faxed over to [facility] yesterday.

## 2018-02-06 NOTE — TELEPHONE ENCOUNTER
----- Message from Khalida Melendrez sent at 2/6/2018 11:20 AM CST -----  Contact: [phone] Mara with [facility]  Mara requested to speak with the doctor in regards to a order they submitted requesting a home health aid for the patient. Nurse advised they are just waiting for the doctor signature. Please call and advise.

## 2018-02-07 ENCOUNTER — TELEPHONE (OUTPATIENT)
Dept: FAMILY MEDICINE | Facility: CLINIC | Age: 73
End: 2018-02-07

## 2018-02-07 NOTE — TELEPHONE ENCOUNTER
Spokew lele Jorge and advised that paperwork was signed and faxed to both numbers provided by company to [facility] overseeing of care.  Ania verbalized understanding.

## 2018-02-07 NOTE — TELEPHONE ENCOUNTER
----- Message from Angelica Gao sent at 2/7/2018 10:00 AM CST -----  Contact: [phone]/Ania From [facility] home  health   Ania would like to speak with you about pt's home health needs. Please advise.

## 2018-02-07 NOTE — DISCHARGE SUMMARY
Patient ID:  [id number]    Admit date: 1/17/18    Discharge date: 1/24/18    Admitting Physician: Mali    Discharge Physician: Mali    Admission Diagnoses:  R DR fx, R pubic rami fx    Discharge Diagnoses: same    Admission Condition: stable    Discharged Condition: stable    Indication for Admission:  Pain control, mobilization, placement    Hospital Course: Patient presented to [facility] on 1/17/18 after slip and fall on ice, found to have R DR fx (CR and splinted in Ed), and R pubic rami fx. She was admitted for pain control and mobilization. She very slowly progressed with therapy and was ultimately discharged to SNF on 1/24/18. Her hospital course was uneventful other than her progression with PT and waiting for placement.     Consults: FM    Significant Diagnostic Studies: XR    Treatments: none other than therapy and CR of R DR    Discharge Exam:  RRR  NAD  Splint CDI  SILT to RUE and RLE    Disposition: SNF    Patient Instructions:     Discharge Medications  Refer to Discharge Medication List    Activity: WBAT RLE, platform WB RUE  Diet: regular diet  Keep splint in place until f/u with hand surgery    Discussed plan with patient and answered questions: No problems    Signed:  Will Colon MD    I agree with findings outlined by the resident.

## 2018-03-06 RX ORDER — HYDROCHLOROTHIAZIDE 25 MG/1
25 TABLET ORAL DAILY
Qty: 30 TABLET | Refills: 10 | Status: SHIPPED | OUTPATIENT
Start: 2018-03-06 | End: 2019-01-18

## 2018-04-09 ENCOUNTER — OFFICE VISIT (OUTPATIENT)
Dept: OBSTETRICS AND GYNECOLOGY | Facility: CLINIC | Age: 73
End: 2018-04-09
Payer: MEDICARE

## 2018-04-09 VITALS
WEIGHT: 160 LBS | BODY MASS INDEX: 26.66 KG/M2 | DIASTOLIC BLOOD PRESSURE: 60 MMHG | HEART RATE: 72 BPM | SYSTOLIC BLOOD PRESSURE: 124 MMHG | HEIGHT: 65 IN

## 2018-04-09 DIAGNOSIS — Z12.89 ENCOUNTER FOR PELVIC SCREENING FOR CANCER: Primary | ICD-10-CM

## 2018-04-09 DIAGNOSIS — Z12.39 SCREENING BREAST EXAMINATION: ICD-10-CM

## 2018-04-09 DIAGNOSIS — S32.414A CLOSED NONDISPLACED FRACTURE OF ANTERIOR WALL OF RIGHT ACETABULUM, INITIAL ENCOUNTER: ICD-10-CM

## 2018-04-09 DIAGNOSIS — Z12.31 SCREENING MAMMOGRAM, ENCOUNTER FOR: ICD-10-CM

## 2018-04-09 PROCEDURE — 99999 PR PBB SHADOW E&M-EST. PATIENT-LVL III: CPT | Mod: PBBFAC,,, | Performed by: OBSTETRICS & GYNECOLOGY

## 2018-04-09 PROCEDURE — G0101 CA SCREEN;PELVIC/BREAST EXAM: HCPCS | Mod: S$GLB,,, | Performed by: OBSTETRICS & GYNECOLOGY

## 2018-04-09 NOTE — PROGRESS NOTES
"Chief Complaint   Patient presents with    Well Woman       HISTORY OF PRESENT ILLNESS:   Roberto Gonzalez is a 72 y.o. female  who presents for well woman exam.  No LMP recorded. Patient is postmenopausal..  She is ,"menopausal at age 50 but reports she had PMB in  worked up by ob with D&C and was told it was normal and no issues since. She has has no complaints. declinesSTD testing. She had a hip fracture in the beginning of the year 2018 after falling on the ice. Isn't on medication for osteopenia or prosis. Has history of stomach ulcers.     Past Medical History:   Diagnosis Date    Benign essential HTN 9/15/2016    Hyperlipidemia     Osteoarthritis     Ulcer of abdomen wall           Past Surgical History:   Procedure Laterality Date     SECTION      KNEE ARTHROSCOPY      TOTAL KNEE ARTHROPLASTY Bilateral          Social History     Social History    Marital status:      Spouse name: N/A    Number of children: N/A    Years of education: N/A     Occupational History    Not on file.     Social History Main Topics    Smoking status: Never Smoker    Smokeless tobacco: Never Used    Alcohol use No    Drug use: No    Sexual activity: Yes     Other Topics Concern    Not on file     Social History Narrative    Exercise: bike    Eating: rare sugar/sweets           Family History   Problem Relation Age of Onset    Coronary artery disease Brother 56    Diabetes Brother     Hypertension Brother     Diabetes Mother     Cancer Neg Hx          OB History    Para Term  AB Living   3 3       3   SAB TAB Ectopic Multiple Live Births           3      # Outcome Date GA Lbr Darrell/2nd Weight Sex Delivery Anes PTL Lv   3 Para      Vag-Spont   SARAH   2 Para      Vag-Spont   SARAH   1 Para      CS-LTranv   SARAH          COMPREHENSIVE GYN HISTORY:  PAP History: Denies abnormal Paps  Infection History: Denies STDs. Denies PID  Benign History: Denies uterine fibroids. Denies ovarian " "cysts. Denies endometriosis Denies other conditions.  Cancer History: Denies cervical cancer. Denies uterine cancer or hyperplasia. Denies ovarian cancer. Denies vulvar cancer or pre-cancer. Denies vaginal cancer or pre-cancer. Denies breast cancer. Denies colon cancer.  Cycle: mon/-7d; menopause in  with episode of PMB in  worked up with D&C and no issues since    ROS:  GENERAL: Denies weight gain or weight loss. Feeling well overall.   SKIN: Denies rash or lesions.   HEAD: Denies headache.   NODES: Denies enlarged lymph nodes.   CHEST: Denies shortness of breath.   ABDOMEN: No abdominal pain, constipation, diarrhea, nausea, vomiting or rectal bleeding.   URINARY: No frequency, dysuria, hematuria, or burning on urination.  REPRODUCTIVE: See HPI.   BREASTS: The patient denies pain, lumps, or nipple discharge.       /60   Pulse 72   Ht 5' 5" (1.651 m)   Wt 72.6 kg (160 lb)   BMI 26.63 kg/m²     APPEARANCE: Well nourished, well developed, in no acute distress.  NECK: Neck symmetric without  thyromegaly.  NODES: No inguinal, cervical lymph node enlargement.  CHEST: Lungs clear to auscultation.  HEART: Regular rate and rhythm, no murmurs, rubs or gallops.  ABDOMEN: Soft. No tenderness or masses. No hernias. No hepatosplenomegaly.  BREASTS: Symmetrical, no skin changes or visible lesions. No palpable masses, nipple discharge or adenopathy bilaterally.  PELVIC:   VULVA: No lesions. Normal female genitalia.  URETHRAL MEATUS: Normal size and location, no lesions, no prolapse.  URETHRA: No masses, tenderness, prolapse or scarring.  VAGINA: Moist and well rugated, no discharge, no significant cystocele or rectocele.  CERVIX: No lesions and discharge.  UTERUS: Normal size, regular shape, mobile, non-tender, bladder base nontender.  ADNEXA: No masses or tenderness.  PERINEUM: Normal, no masses or lesions     Data Reviewed:   Last Pap: Date:  NILM  Last MM2016 BIRADs 1   Lipid profile:   Lab Results "   Component Value Date    CHOL 227 (H) 04/25/2017    CHOL 208 (H) 07/25/2016    CHOL 246 (H) 02/23/2016     Lab Results   Component Value Date    HDL 60 04/25/2017    HDL 54 07/25/2016    HDL 61 02/23/2016     Lab Results   Component Value Date    LDLCALC 142.8 04/25/2017    LDLCALC 137.6 07/25/2016    LDLCALC 168.8 (H) 02/23/2016     Lab Results   Component Value Date    TRIG 121 04/25/2017    TRIG 82 07/25/2016    TRIG 81 02/23/2016     Lab Results   Component Value Date    CHOLHDL 26.4 04/25/2017    CHOLHDL 26.0 07/25/2016    CHOLHDL 24.8 02/23/2016     Colonoscopy: 2013 negative, recommend repeat 10 years  DEXA: 2016 osteopenia     1. Encounter for pelvic screening for cancer    2. Closed nondisplaced fracture of anterior wall of right acetabulum, initial encounter    3. Screening breast examination    4. Screening mammogram, encounter for        Plan: 1. Routine gyn annual exam. s/p normal breast exam and MMG ordered.   Pap with HPV cotesting not indicated, since over 65 and no history of abn pap.   Lipid Profile, needed every 5 years, up to date. Fasting glucose, needed every 3 years, up to date. TSH, needed every 5 years, up to date.  Colonoscopy up to date. DEXA up to date    2. Discussed would typically recommend treatment after hip fracture with medication like bisphosphate but her orthopedist didn't mention it to her. With h/o stomach ulcer would likely recommend sometime else like prolia, will message PCP who she is following up with in the next month for input as well.     F/u in 1 yr or PRN

## 2018-04-15 RX ORDER — PRAVASTATIN SODIUM 10 MG/1
TABLET ORAL
Qty: 90 TABLET | Refills: 3 | Status: SHIPPED | OUTPATIENT
Start: 2018-04-15 | End: 2019-07-16 | Stop reason: SDUPTHER

## 2018-04-17 ENCOUNTER — HOSPITAL ENCOUNTER (OUTPATIENT)
Dept: RADIOLOGY | Facility: HOSPITAL | Age: 73
Discharge: HOME | End: 2018-04-17
Attending: OBSTETRICS & GYNECOLOGY
Payer: MEDICARE

## 2018-04-17 ENCOUNTER — TELEPHONE (OUTPATIENT)
Dept: OBSTETRICS AND GYNECOLOGY | Facility: CLINIC | Age: 73
End: 2018-04-17

## 2018-04-17 DIAGNOSIS — Z12.31 SCREENING MAMMOGRAM, ENCOUNTER FOR: ICD-10-CM

## 2018-04-17 PROCEDURE — 77063 BREAST TOMOSYNTHESIS BI: CPT | Mod: 26,,, | Performed by: RADIOLOGY

## 2018-04-17 PROCEDURE — 77067 SCR MAMMO BI INCL CAD: CPT | Mod: 26,,, | Performed by: RADIOLOGY

## 2018-04-17 PROCEDURE — 77067 SCR MAMMO BI INCL CAD: CPT | Mod: TC

## 2018-04-17 NOTE — TELEPHONE ENCOUNTER
----- Message from Beba Finch MD sent at 4/17/2018  1:30 PM CDT -----  Please let patient know her mammogram showed no evidence of cancer. Thanks.

## 2018-04-19 ENCOUNTER — OFFICE VISIT (OUTPATIENT)
Dept: OPTOMETRY | Facility: CLINIC | Age: 73
End: 2018-04-19
Payer: MEDICARE

## 2018-04-19 DIAGNOSIS — I10 ESSENTIAL HYPERTENSION: ICD-10-CM

## 2018-04-19 DIAGNOSIS — H52.03 HYPEROPIA OF BOTH EYES WITH ASTIGMATISM AND PRESBYOPIA: ICD-10-CM

## 2018-04-19 DIAGNOSIS — H25.813 COMBINED FORM OF SENILE CATARACT OF BOTH EYES: Primary | ICD-10-CM

## 2018-04-19 DIAGNOSIS — H52.4 HYPEROPIA OF BOTH EYES WITH ASTIGMATISM AND PRESBYOPIA: ICD-10-CM

## 2018-04-19 DIAGNOSIS — H52.203 HYPEROPIA OF BOTH EYES WITH ASTIGMATISM AND PRESBYOPIA: ICD-10-CM

## 2018-04-19 DIAGNOSIS — H04.123 DRY EYE SYNDROME OF BILATERAL LACRIMAL GLANDS: ICD-10-CM

## 2018-04-19 PROCEDURE — 92004 COMPRE OPH EXAM NEW PT 1/>: CPT | Mod: S$GLB,,, | Performed by: OPTOMETRIST

## 2018-04-19 PROCEDURE — 99999 PR PBB SHADOW E&M-EST. PATIENT-LVL II: CPT | Mod: PBBFAC,,, | Performed by: OPTOMETRIST

## 2018-04-19 PROCEDURE — 92015 DETERMINE REFRACTIVE STATE: CPT | Mod: S$GLB,,, | Performed by: OPTOMETRIST

## 2018-04-19 NOTE — PROGRESS NOTES
HPI      returns for overdue eye exam. She reports no noticeable changes   with vision. Pt was told in the past she had cataract's that were maturing   slowly. Pt sts having itchy eyes from allergies. No pain or discomfort. No   f/f           Last edited by Shantel Corea, OD on 4/19/2018  2:57 PM. (History)            Assessment /Plan     For exam results, see Encounter Report.    Combined form of senile cataract of both eyes    Dry eye syndrome of bilateral lacrimal glands    Hyperopia of both eyes with astigmatism and presbyopia    Essential hypertension      1. Educated pt of today's findings. No sx needed at this time. Continue to monitor in 1 yr.  2. Educated pt on today's findings and recommended the use of AT's OU tid/prn to help with dry eyes.  3. Educated pt on change in Rx. Rx Final Rx. RTC in 1 yr.  4. (-)CWS, hemes, papilledema. Continue compliance with PCP and meds. Recheck in 1 yr.

## 2018-05-08 ENCOUNTER — TELEPHONE (OUTPATIENT)
Dept: OBSTETRICS AND GYNECOLOGY | Facility: CLINIC | Age: 73
End: 2018-05-08

## 2018-05-08 NOTE — TELEPHONE ENCOUNTER
Patient was suppose to schedule an appointment with her PCP Dr. Butler to get evaluated for osteoporosis and possibly start treatment but I see she didn't make an appointment. Could you please call and remind her? Thanks.

## 2018-05-30 ENCOUNTER — TELEPHONE (OUTPATIENT)
Dept: FAMILY MEDICINE | Facility: CLINIC | Age: 73
End: 2018-05-30

## 2018-05-30 NOTE — TELEPHONE ENCOUNTER
----- Message from Khalida Melendrez sent at 5/30/2018 10:04 AM CDT -----  Contact: [phone]  Patient is requesting a pain medication be called in to [id number] until she see the doctor on 06/12. Patient advised she has pain from her mouth to her arm. Please call and advise.

## 2018-05-31 ENCOUNTER — OFFICE VISIT (OUTPATIENT)
Dept: FAMILY MEDICINE | Facility: CLINIC | Age: 73
End: 2018-05-31
Payer: MEDICARE

## 2018-05-31 VITALS
TEMPERATURE: 99 F | SYSTOLIC BLOOD PRESSURE: 131 MMHG | DIASTOLIC BLOOD PRESSURE: 75 MMHG | WEIGHT: 161.38 LBS | BODY MASS INDEX: 26.89 KG/M2 | HEIGHT: 65 IN | HEART RATE: 82 BPM | OXYGEN SATURATION: 98 %

## 2018-05-31 DIAGNOSIS — K02.9 DENTAL CARIES: ICD-10-CM

## 2018-05-31 DIAGNOSIS — L25.9 CONTACT DERMATITIS, UNSPECIFIED CONTACT DERMATITIS TYPE, UNSPECIFIED TRIGGER: ICD-10-CM

## 2018-05-31 DIAGNOSIS — H65.93 BILATERAL SEROUS OTITIS MEDIA, UNSPECIFIED CHRONICITY: ICD-10-CM

## 2018-05-31 DIAGNOSIS — R51.9 FACIAL PAIN: Primary | ICD-10-CM

## 2018-05-31 PROCEDURE — 3078F DIAST BP <80 MM HG: CPT | Mod: CPTII,S$GLB,, | Performed by: FAMILY MEDICINE

## 2018-05-31 PROCEDURE — 99214 OFFICE O/P EST MOD 30 MIN: CPT | Mod: S$GLB,,, | Performed by: FAMILY MEDICINE

## 2018-05-31 PROCEDURE — 99999 PR PBB SHADOW E&M-EST. PATIENT-LVL III: CPT | Mod: PBBFAC,,, | Performed by: FAMILY MEDICINE

## 2018-05-31 PROCEDURE — 3075F SYST BP GE 130 - 139MM HG: CPT | Mod: CPTII,S$GLB,, | Performed by: FAMILY MEDICINE

## 2018-05-31 RX ORDER — TRIAMCINOLONE ACETONIDE 1 MG/G
CREAM TOPICAL 2 TIMES DAILY
Qty: 30 G | Refills: 1 | Status: SHIPPED | OUTPATIENT
Start: 2018-05-31 | End: 2019-07-30 | Stop reason: SDUPTHER

## 2018-05-31 RX ORDER — PREDNISONE 20 MG/1
20 TABLET ORAL 2 TIMES DAILY
Qty: 10 TABLET | Refills: 0 | Status: SHIPPED | OUTPATIENT
Start: 2018-05-31 | End: 2018-06-05

## 2018-05-31 RX ORDER — NAPROXEN 500 MG/1
500 TABLET ORAL 2 TIMES DAILY PRN
Qty: 60 TABLET | Refills: 1 | Status: SHIPPED | OUTPATIENT
Start: 2018-05-31 | End: 2018-06-30

## 2018-05-31 NOTE — PROGRESS NOTES
(Portions of this note were dictated using voice recognition software and may contain dictation related errors in spelling/grammar/syntax not found on text review)    CC:   Chief Complaint   Patient presents with    Facial Pain     Right Side       HPI: 72 y.o. female here for right-sided facial pain. She had slipped and fallen in January, no head injury but she broke her right hip and broke her right wrist.  She was admitted to the hospital here.  She later had surgery for her wrist at [facility].  Currently doing well from this perspective although in the last 5 days she has noticed onset of right-sided facial pain and ear pain which is quite severe.  She will have pain radiating to the neck on the right side only.  No left-sided symptoms.  Only very sporadic nasal congestion. No fevers.  She had a couple of very slight chill episodes but she never thought much of this.  No vomiting or diarrhea.  No chest pain or shortness of breath.  Feels some decrease in hearing. she supposed to fly out of town to visit family in the next couple of days    Also noticed a rash on her left foot.  Was going outside to  the mail and had a long dressed on.  The dressed must have gotten wet with contact with the due on the grass and she noticed that it was sticking to her leg.  She thereafter recognize that she had a mild red rash here.  It is not painful at all or itchy.  Has used some Benadryl cream without any improvement.    Past Medical History:   Diagnosis Date    Benign essential HTN 9/15/2016    Hyperlipidemia     Osteoarthritis     Ulcer of abdomen wall        Past Surgical History:   Procedure Laterality Date     SECTION      KNEE ARTHROSCOPY      TOTAL KNEE ARTHROPLASTY Bilateral        Family History   Problem Relation Age of Onset    Coronary artery disease Brother 56    Diabetes Brother     Hypertension Brother     Diabetes Mother     No Known Problems Father     No Known  Problems Sister     No Known Problems Maternal Aunt     No Known Problems Maternal Uncle     No Known Problems Paternal Aunt     No Known Problems Paternal Uncle     No Known Problems Maternal Grandmother     No Known Problems Maternal Grandfather     No Known Problems Paternal Grandmother     No Known Problems Paternal Grandfather     Cancer Neg Hx     Amblyopia Neg Hx     Blindness Neg Hx     Cataracts Neg Hx     Glaucoma Neg Hx     Macular degeneration Neg Hx     Retinal detachment Neg Hx     Strabismus Neg Hx     Stroke Neg Hx     Thyroid disease Neg Hx        Social History     Social History    Marital status:      Spouse name: N/A    Number of children: N/A    Years of education: N/A     Occupational History    Not on file.     Social History Main Topics    Smoking status: Never Smoker    Smokeless tobacco: Never Used    Alcohol use No    Drug use: No    Sexual activity: Yes     Other Topics Concern    Not on file     Social History Narrative    Exercise: bike    Eating: rare sugar/sweets              ROS:  GENERAL: No fever, chills, fatigability or weight loss.  SKIN:  Above.  HEAD: No headaches.  EYES: No visual changes  EARS:  Above.  NOSE:  Above  MOUTH & THROAT: No hoarseness, change in voice, or sore throat.  NODES: Denies swollen glands.  CHEST: Denies VELASQUEZ, cyanosis, wheezing, cough and sputum production.  CARDIOVASCULAR: Denies chest pain, PND, orthopnea.  ABDOMEN: No nausea, vomiting, or changes in bowel function.  URINARY: No flank pain, dysuria or hematuria.  PERIPHERAL VASCULAR: No claudication or cyanosis.  MUSCULOSKELETAL above.  NEUROLOGIC: No weakness or numbness.    Vital signs reviewed  PE:   APPEARANCE: Well nourished, well developed, in no acute distress.    HEAD: Normocephalic, atraumatic.  Does have some right maxillary sinus tenderness to palpation  EYES: PERRL. EOMI.   Conjunctivae noninjected.  EARS: TM's bilaterally retracted with serous effusion  noted bilaterally.  External canal is normal bilaterally. No bulging of the TM.  NOSE: Mucosa pink. Airway clear.  MOUTH & THROAT: No tonsillar enlargement. No pharyngeal erythema or exudate.  Maxillary molar caries noted on the right.  Teeth and gingiva are nontender to palpation.  No drainage noted.  NECK: Supple with no cervical lymphadenopathy.  Slight tenderness to palpation on the right side of the neck without any definitive mass or lymphadenopathy here.  CHEST: Good inspiratory effort. Lungs clear to auscultation with no wheezes or crackles.  CARDIOVASCULAR: Normal S1, S2. No rubs, murmurs, or gallops.  ABDOMEN: Bowel sounds normal. Not distended. Soft. No tenderness or masses. No organomegaly.  EXTREMITIES: No edema, cyanosis, or clubbing.  Mild bandlike erythematous eruption noted left ankle.  No tenderness to palpation.  This is not raised or scaly.  No vesicles.      IMPRESSION  1. Facial pain    2. Bilateral serous otitis media, unspecified chronicity    3. Contact dermatitis, unspecified contact dermatitis type, unspecified trigger    4. Dental caries            PLAN  Facial and right ear pain:  Differential including referred pain secondary to serous otitis media, allergic or bacterial sinusitis, TMJ syndrome, tooth abscess possibly given her dental caries..  Trial of Naprosyn 500 mg b.i.d. as needed.  Trial of prednisone 20 mg b.i.d. for 5 days.  She does have Flonase at home and can continue to use this.  No obvious sign of bacterial infection on exam however if symptoms do not improve, may need to consider dental disease and trial of Augmentin with referral to her dentist for further imaging.  She will be out of town for the next week or so but can call the office and we can send a prescription as above to a preferred pharmacy where she will be located.    Mild contact dermatitis left ankle:  Triamcinolone cream apply twice daily for the next week    SCREENINGS  Colonoscopy 2013, repeat in 10  years  Mammogram  4/17/18  GYN: Dr. Best     Immunizations  Pneumovax 2013  Shingles 2013  Adacel 2013  Prevnar 2016     Bone density 2/23/2016, osteopenia of the hip and back, frax score was 5.7% total risk, 0.9% hip fracture risk

## 2018-06-11 ENCOUNTER — TELEPHONE (OUTPATIENT)
Dept: FAMILY MEDICINE | Facility: CLINIC | Age: 73
End: 2018-06-11

## 2018-06-11 NOTE — TELEPHONE ENCOUNTER
----- Message from Khalida Melendrez sent at 6/11/2018  9:24 AM CDT -----  Contact: [phone]  Patient requested to speak with the nurse about her appt that's scheduled for tomorrow. Please call and advise.

## 2018-06-12 ENCOUNTER — OFFICE VISIT (OUTPATIENT)
Dept: FAMILY MEDICINE | Facility: CLINIC | Age: 73
End: 2018-06-12
Payer: MEDICARE

## 2018-06-12 VITALS
DIASTOLIC BLOOD PRESSURE: 76 MMHG | TEMPERATURE: 98 F | OXYGEN SATURATION: 98 % | HEART RATE: 66 BPM | SYSTOLIC BLOOD PRESSURE: 131 MMHG | HEIGHT: 65 IN | WEIGHT: 159.19 LBS | BODY MASS INDEX: 26.52 KG/M2

## 2018-06-12 DIAGNOSIS — E78.2 HYPERLIPIDEMIA, MIXED: ICD-10-CM

## 2018-06-12 DIAGNOSIS — M89.9 BONE DISORDER: Primary | ICD-10-CM

## 2018-06-12 DIAGNOSIS — I10 BENIGN ESSENTIAL HTN: ICD-10-CM

## 2018-06-12 DIAGNOSIS — M85.80 OSTEOPENIA, UNSPECIFIED LOCATION: ICD-10-CM

## 2018-06-12 DIAGNOSIS — J30.9 ALLERGIC RHINITIS, UNSPECIFIED SEASONALITY, UNSPECIFIED TRIGGER: ICD-10-CM

## 2018-06-12 PROBLEM — T14.90XA TRAUMA: Status: RESOLVED | Noted: 2018-01-17 | Resolved: 2018-06-12

## 2018-06-12 PROBLEM — R33.8 POSTOPERATIVE URINARY RETENTION: Status: RESOLVED | Noted: 2018-01-22 | Resolved: 2018-06-12

## 2018-06-12 PROBLEM — S62.101A CLOSED FRACTURE OF RIGHT WRIST: Status: RESOLVED | Noted: 2018-01-22 | Resolved: 2018-06-12

## 2018-06-12 PROBLEM — S52.531A FRACTURE, COLLES, RIGHT, CLOSED: Status: RESOLVED | Noted: 2018-01-22 | Resolved: 2018-06-12

## 2018-06-12 PROBLEM — S32.414A CLOSED NONDISPLACED FRACTURE OF ANTERIOR WALL OF RIGHT ACETABULUM: Status: RESOLVED | Noted: 2018-01-22 | Resolved: 2018-06-12

## 2018-06-12 PROBLEM — N99.89 POSTOPERATIVE URINARY RETENTION: Status: RESOLVED | Noted: 2018-01-22 | Resolved: 2018-06-12

## 2018-06-12 PROCEDURE — 99214 OFFICE O/P EST MOD 30 MIN: CPT | Mod: S$GLB,,, | Performed by: FAMILY MEDICINE

## 2018-06-12 PROCEDURE — 3075F SYST BP GE 130 - 139MM HG: CPT | Mod: CPTII,S$GLB,, | Performed by: FAMILY MEDICINE

## 2018-06-12 PROCEDURE — 3078F DIAST BP <80 MM HG: CPT | Mod: CPTII,S$GLB,, | Performed by: FAMILY MEDICINE

## 2018-06-12 PROCEDURE — 99999 PR PBB SHADOW E&M-EST. PATIENT-LVL III: CPT | Mod: PBBFAC,,, | Performed by: FAMILY MEDICINE

## 2018-06-12 NOTE — PROGRESS NOTES
(Portions of this note were dictated using voice recognition software and may contain dictation related errors in spelling/grammar/syntax not found on text review)    CC:   Chief Complaint   Patient presents with    Follow-up       HPI: 72 y.o. female Last annual exam was in 2017, last visit May 31st for right-sided facial and neck pain and ear pain.  Bilateral serous otitis media noted.  Patient was given trial of Naprosyn and prednisone plus counseled on using Flonase.  Does have dental caries and would entertain possibility of potential referred pain secondary to dental infection.  Overall pain is doing better but still does have some pain in the right ear.  Does endorse some sinus and allergy issues.  Had difficulty taking Flonase secondary to nasal irritation.    Hypertension on HCTZ 25 mg daily and amlodipine 10 mg daily    Hyperlipidemia on pravastatin 10 mg daily    Osteopenia, walking for exercise.  Has lost some weight    Past Medical History:   Diagnosis Date    Benign essential HTN 9/15/2016    Closed fracture of right wrist 2018    2/2 fall Closed reduction and split done Tolerating well PT/OT on board    Closed nondisplaced fracture of anterior wall of right acetabulum 2018    Hyperlipidemia     Osteoarthritis     Osteopenia     Ulcer of abdomen wall        Past Surgical History:   Procedure Laterality Date     SECTION      KNEE ARTHROSCOPY      TOTAL KNEE ARTHROPLASTY Bilateral        Family History   Problem Relation Age of Onset    Coronary artery disease Brother 56    Diabetes Brother     Hypertension Brother     Diabetes Mother     No Known Problems Father     No Known Problems Sister     No Known Problems Maternal Aunt     No Known Problems Maternal Uncle     No Known Problems Paternal Aunt     No Known Problems Paternal Uncle     No Known Problems Maternal Grandmother     No Known Problems Maternal Grandfather     No Known Problems Paternal  Grandmother     No Known Problems Paternal Grandfather     Cancer Neg Hx     Amblyopia Neg Hx     Blindness Neg Hx     Cataracts Neg Hx     Glaucoma Neg Hx     Macular degeneration Neg Hx     Retinal detachment Neg Hx     Strabismus Neg Hx     Stroke Neg Hx     Thyroid disease Neg Hx        Social History     Social History    Marital status:      Spouse name: N/A    Number of children: N/A    Years of education: N/A     Occupational History    Not on file.     Social History Main Topics    Smoking status: Never Smoker    Smokeless tobacco: Never Used    Alcohol use No    Drug use: No    Sexual activity: Yes     Other Topics Concern    Not on file     Social History Narrative    Exercise: bike    Eating: rare sugar/sweets         Lab Results   Component Value Date    WBC 7.97 01/22/2018    HGB 11.2 (L) 01/22/2018    HCT 33.2 (L) 01/22/2018     01/22/2018    CHOL 227 (H) 04/25/2017    TRIG 121 04/25/2017    HDL 60 04/25/2017    ALT 18 01/22/2018    AST 19 01/22/2018     01/22/2018    K 3.8 01/22/2018     01/22/2018    CREATININE 0.8 01/22/2018    CALCIUM 9.2 01/22/2018    BUN 15 01/22/2018    CO2 27 01/22/2018    TSH 2.868 04/25/2017    INR 1.0 01/17/2018    LDLCALC 142.8 04/25/2017     01/22/2018           ROS:  GENERAL: No fever, chills, fatigability or weight loss.  SKIN: No rashes, no itching.  HEAD: No headaches.  EYES: No visual changes  EARS:  Above  NOSE:  Nasal congestion  MOUTH & THROAT: No hoarseness, change in voice, or sore throat.  NODES: Denies swollen glands.  CHEST: Denies VELASQUEZ, cyanosis, wheezing, cough and sputum production.  CARDIOVASCULAR: Denies chest pain, PND, orthopnea.  ABDOMEN: No nausea, vomiting, or changes in bowel function.  URINARY: No flank pain, dysuria or hematuria.  PERIPHERAL VASCULAR: No claudication or cyanosis.  MUSCULOSKELETAL: No joint stiffness or swelling. Denies back pain.  NEUROLOGIC: No weakness or numbness.    Vital  signs reviewed  PE:   APPEARANCE: Well nourished, well developed, in no acute distress.    HEAD: Normocephalic, atraumatic.  EYES: PERRL. EOMI.   Conjunctivae noninjected.  EARS: TM's intact. Light reflex normal. No retraction or perforation.  Serous effusion bilaterally.  NOSE: Mucosa pink. Airway clear.  Slightly swollen nasal turbinates bilaterally.  MOUTH & THROAT: No tonsillar enlargement. No pharyngeal erythema or exudate.   NECK: Supple with no cervical lymphadenopathy.  No carotid bruits.  No thyromegaly  CHEST: Good inspiratory effort. Lungs clear to auscultation with no wheezes or crackles.  CARDIOVASCULAR: Normal S1, S2. No rubs, murmurs, or gallops.  ABDOMEN: Bowel sounds normal. Not distended. Soft. No tenderness or masses. No organomegaly.  EXTREMITIES: No edema, cyanosis, or clubbing.      IMPRESSION  1. Bone disorder    2. Osteopenia, unspecified location    3. Benign essential HTN    4. Hyperlipidemia, mixed            PLAN  Hypertension controlled.  Continue current therapy.  Continue exercise    Hyperlipidemia:  Continue statin    Osteopenia:  Update DEXA.  Continue weight-bearing exercise    Allergic rhinitis instructions.  :NASAL SALINE (OCEAN, SIMPLY SALINE, NEILMED), few sprays prior to using flonase spray  NETI POT SALINE SINUS RINSE.  MUCINEX DM IS OK FOR COUGH  CLARITIN PLAIN DAILY (NO D) FOR ALLERGIES/SINUS ISSUES.      SCREENINGS  Colonoscopy 2013, repeat in 10 years  Mammogram  4/17/18  GYN: Dr. Best     Immunizations  Pneumovax 2013  Shingles 2013  Adacel 2013  Prevnar 2016     Bone density 2/23/2016, osteopenia of the hip and back, frax score was 5.7% total risk, 0.9% hip fracture risk, repeat due

## 2018-06-12 NOTE — PATIENT INSTRUCTIONS
NASAL SALINE (OCEAN, SIMPLY SALINE, NEILMED), few sprays prior to using flonase spray    NETI POT SALINE SINUS RINSE.    MUCINEX DM IS OK FOR COUGH    CLARITIN PLAIN DAILY (NO D) FOR ALLERGIES/SINUS ISSUES.

## 2018-06-13 ENCOUNTER — HOSPITAL ENCOUNTER (OUTPATIENT)
Dept: RADIOLOGY | Facility: HOSPITAL | Age: 73
Discharge: HOME | End: 2018-06-13
Attending: FAMILY MEDICINE
Payer: MEDICARE

## 2018-06-13 DIAGNOSIS — M89.9 BONE DISORDER: ICD-10-CM

## 2018-06-13 DIAGNOSIS — M85.80 OSTEOPENIA, UNSPECIFIED LOCATION: ICD-10-CM

## 2018-06-13 PROCEDURE — 77080 DXA BONE DENSITY AXIAL: CPT | Mod: 26,,, | Performed by: RADIOLOGY

## 2018-06-13 PROCEDURE — 77080 DXA BONE DENSITY AXIAL: CPT | Mod: TC

## 2018-06-16 ENCOUNTER — TELEPHONE (OUTPATIENT)
Dept: FAMILY MEDICINE | Facility: CLINIC | Age: 73
End: 2018-06-16

## 2018-06-16 DIAGNOSIS — M81.0 OSTEOPOROSIS, UNSPECIFIED OSTEOPOROSIS TYPE, UNSPECIFIED PATHOLOGICAL FRACTURE PRESENCE: ICD-10-CM

## 2018-06-16 RX ORDER — ALENDRONATE SODIUM 70 MG/1
70 TABLET ORAL
Qty: 4 TABLET | Refills: 11 | Status: SHIPPED | OUTPATIENT
Start: 2018-06-16 | End: 2019-06-04 | Stop reason: SDUPTHER

## 2018-06-16 NOTE — TELEPHONE ENCOUNTER
Please tell patient that her updated bone density scan does show osteoporosis of the spine, osteopenia of the hips.  Since there is worsening of the spine bone density, I think it is reasonable to start medication for osteoporosis to help reduce the risk of her getting a fracture an of the spine or hip.  I would like to start a medicine called Fosamax or alendronate.  This is taken once a week.  It is supposed to be taken on an empty stomach with a full glass of water but no other food for at least half an hour, and she should not lie down for half an hour after taking the medication so the pill can properly pass into the stomach without sitting in the esophagus for too long.    If she starts getting any significant worsening of acid reflux type symptoms or stomach pain, or she starts getting flu like symptoms with that body aches, please let me know and I can stop this medication.  These are not terribly common side effects but are worth mentioning and could justify change in her medication

## 2018-06-18 ENCOUNTER — TELEPHONE (OUTPATIENT)
Dept: FAMILY MEDICINE | Facility: CLINIC | Age: 73
End: 2018-06-18

## 2018-06-18 NOTE — TELEPHONE ENCOUNTER
Advised patient that her updated bone density scan does show osteoporosis of the spine and osteopenia of the hips.  Since there is worsening of the spine bone density, Dr Butler thinks it is reasonable to start medication for osteoporosis to help reduce the risk of her getting a fracture an of the spine or hip.  He would like to start a medicine called Fosamax or alendronate.  This is taken once a week.  It is supposed to be taken on an empty stomach with a full glass of water but no other food for at least half an hour, and she should not lie down for half an hour after taking the medication so the pill can properly pass into the stomach without sitting in the esophagus for too long.     If she starts getting any significant worsening of acid reflux type symptoms or stomach pain, or she starts getting flu like symptoms with that body aches, please let me know and I can stop this medication.  These are not terribly common side effects but are worth mentioning and could justify change in her medication.  Patient verbalized understanding.

## 2018-10-28 RX ORDER — AMLODIPINE BESYLATE 10 MG/1
10 TABLET ORAL DAILY
Qty: 30 TABLET | Refills: 8 | Status: SHIPPED | OUTPATIENT
Start: 2018-10-28 | End: 2019-01-20 | Stop reason: SDUPTHER

## 2019-01-18 ENCOUNTER — OFFICE VISIT (OUTPATIENT)
Dept: FAMILY MEDICINE | Facility: CLINIC | Age: 74
End: 2019-01-18
Payer: MEDICARE

## 2019-01-18 VITALS
WEIGHT: 166 LBS | HEART RATE: 70 BPM | HEIGHT: 65 IN | SYSTOLIC BLOOD PRESSURE: 126 MMHG | OXYGEN SATURATION: 98 % | BODY MASS INDEX: 27.66 KG/M2 | TEMPERATURE: 98 F | DIASTOLIC BLOOD PRESSURE: 76 MMHG

## 2019-01-18 DIAGNOSIS — J30.9 ALLERGIC RHINITIS, UNSPECIFIED SEASONALITY, UNSPECIFIED TRIGGER: ICD-10-CM

## 2019-01-18 DIAGNOSIS — I10 HYPERTENSION, UNSPECIFIED TYPE: ICD-10-CM

## 2019-01-18 DIAGNOSIS — R53.83 FATIGUE, UNSPECIFIED TYPE: ICD-10-CM

## 2019-01-18 DIAGNOSIS — M81.0 OSTEOPOROSIS, UNSPECIFIED OSTEOPOROSIS TYPE, UNSPECIFIED PATHOLOGICAL FRACTURE PRESENCE: ICD-10-CM

## 2019-01-18 DIAGNOSIS — M25.552 LEFT HIP PAIN: Primary | ICD-10-CM

## 2019-01-18 PROCEDURE — 1100F PR PT FALLS ASSESS DOC 2+ FALLS/FALL W/INJURY/YR: ICD-10-PCS | Mod: CPTII,S$GLB,, | Performed by: FAMILY MEDICINE

## 2019-01-18 PROCEDURE — 3288F PR FALLS RISK ASSESSMENT DOCUMENTED: ICD-10-PCS | Mod: CPTII,S$GLB,, | Performed by: FAMILY MEDICINE

## 2019-01-18 PROCEDURE — 3074F PR MOST RECENT SYSTOLIC BLOOD PRESSURE < 130 MM HG: ICD-10-PCS | Mod: CPTII,S$GLB,, | Performed by: FAMILY MEDICINE

## 2019-01-18 PROCEDURE — 3288F FALL RISK ASSESSMENT DOCD: CPT | Mod: CPTII,S$GLB,, | Performed by: FAMILY MEDICINE

## 2019-01-18 PROCEDURE — 99999 PR PBB SHADOW E&M-EST. PATIENT-LVL IV: ICD-10-PCS | Mod: PBBFAC,,, | Performed by: FAMILY MEDICINE

## 2019-01-18 PROCEDURE — 3078F PR MOST RECENT DIASTOLIC BLOOD PRESSURE < 80 MM HG: ICD-10-PCS | Mod: CPTII,S$GLB,, | Performed by: FAMILY MEDICINE

## 2019-01-18 PROCEDURE — 1100F PTFALLS ASSESS-DOCD GE2>/YR: CPT | Mod: CPTII,S$GLB,, | Performed by: FAMILY MEDICINE

## 2019-01-18 PROCEDURE — 3078F DIAST BP <80 MM HG: CPT | Mod: CPTII,S$GLB,, | Performed by: FAMILY MEDICINE

## 2019-01-18 PROCEDURE — 99215 OFFICE O/P EST HI 40 MIN: CPT | Mod: S$GLB,,, | Performed by: FAMILY MEDICINE

## 2019-01-18 PROCEDURE — 99999 PR PBB SHADOW E&M-EST. PATIENT-LVL IV: CPT | Mod: PBBFAC,,, | Performed by: FAMILY MEDICINE

## 2019-01-18 PROCEDURE — 3074F SYST BP LT 130 MM HG: CPT | Mod: CPTII,S$GLB,, | Performed by: FAMILY MEDICINE

## 2019-01-18 PROCEDURE — 99215 PR OFFICE/OUTPT VISIT, EST, LEVL V, 40-54 MIN: ICD-10-PCS | Mod: S$GLB,,, | Performed by: FAMILY MEDICINE

## 2019-01-18 NOTE — PROGRESS NOTES
(Portions of this note were dictated using voice recognition software and may contain dictation related errors in spelling/grammar/syntax not found on text review)    CC: No chief complaint on file.      HPI: 73 y.o. female Last visit June of 2018.  Medical history below.  Just came back from [place] last month.  However, she has had some leg pain on the left side sometimes occasionally, chronically.  However, worse in the last 2 months.  No significant worsening since returning from in the although states that this would sometimes keep her from being able to walk a lot.  Complains of pain mainly on left hip, both lateral aspects of the hip radiating down the lateral aspect of her thigh to below her knee, but also pain in the groin area, worse with weight-bearing.  The lateral pain is usually present on sitting but the groin pain is not.  No paresthesias or numbness.  Occasional back pain. No direct trauma although last year she did have a fall and had a pubic ramus fracture extending into the acetabulum on the right side.  Did not have surgery for this.  Right-sided actually doing quite well without any significant pain complaints today.  Tylenol does seem to temporarily help the left-sided pain. She was concerned because she is getting ready to go back to [place] shortly and wants to have this evaluated    She is also concerned about some chronic fatigue issues.  She saw cardiologist last year and was told her heart was fine.  She denies any chest pain or shortness of breath.  Occasionally feels lightheaded.  Does feel tired when trying to exercise.  No abdominal pain, nausea, vomiting, diarrhea.  Normal oral intake.  Normal sleep.  Denies any specific mood issues.  Hypertension is controlled.  She did stop her hydrochlorothiazide.  She is down to half a pill on her amlodipine 10 mg daily.  No other new medications coexisting with her fatigue    Past Medical History:   Diagnosis Date    Benign essential HTN 9/15/2016     Closed fracture of right wrist 2018    2/2 fall Closed reduction and split done Tolerating well PT/OT on board    Closed nondisplaced fracture of anterior wall of right acetabulum 2018    Hyperlipidemia     Osteoarthritis     Osteoporosis     Ulcer of abdomen wall        Past Surgical History:   Procedure Laterality Date    ARTHROPLASTY-KNEE-BILATERAL Bilateral 2016    [facility]     SECTION      COLONOSCOPY N/A 2013    [facility]    KNEE ARTHROSCOPY      TOTAL KNEE ARTHROPLASTY Bilateral        Family History   Problem Relation Age of Onset    Coronary artery disease Brother 56    Diabetes Brother     Hypertension Brother     Diabetes Mother     No Known Problems Father     No Known Problems Sister     No Known Problems Maternal Aunt     No Known Problems Maternal Uncle     No Known Problems Paternal Aunt     No Known Problems Paternal Uncle     No Known Problems Maternal Grandmother     No Known Problems Maternal Grandfather     No Known Problems Paternal Grandmother     No Known Problems Paternal Grandfather     Cancer Neg Hx     Amblyopia Neg Hx     Blindness Neg Hx     Cataracts Neg Hx     Glaucoma Neg Hx     Macular degeneration Neg Hx     Retinal detachment Neg Hx     Strabismus Neg Hx     Stroke Neg Hx     Thyroid disease Neg Hx        Social History     Socioeconomic History    Marital status:      Spouse name: Not on file    Number of children: Not on file    Years of education: Not on file    Highest education level: Not on file   Social Needs    Financial resource strain: Not on file    Food insecurity - worry: Not on file    Food insecurity - inability: Not on file    Transportation needs - medical: Not on file    Transportation needs - non-medical: Not on file   Occupational History    Not on file   Tobacco Use    Smoking status: Never Smoker     Smokeless tobacco: Never Used   Substance and Sexual Activity    Alcohol use: No    Drug use: No    Sexual activity: Yes   Other Topics Concern    Not on file   Social History Narrative    Exercise: bike    Eating: rare sugar/sweets     Lab Results   Component Value Date    WBC 7.97 01/22/2018    HGB 11.2 (L) 01/22/2018    HCT 33.2 (L) 01/22/2018     01/22/2018    CHOL 227 (H) 04/25/2017    TRIG 121 04/25/2017    HDL 60 04/25/2017    ALT 18 01/22/2018    AST 19 01/22/2018     01/22/2018    K 3.8 01/22/2018     01/22/2018    CREATININE 0.8 01/22/2018    CALCIUM 9.2 01/22/2018    ALBUMIN 2.9 (L) 01/22/2018    BUN 15 01/22/2018    CO2 27 01/22/2018    TSH 2.868 04/25/2017    INR 1.0 01/17/2018    LDLCALC 142.8 04/25/2017     01/22/2018       Hemoglobin   Date Value Ref Range Status   01/22/2018 11.2 (L) 12.0 - 16.0 g/dL Final   01/20/2018 11.3 (L) 12.0 - 16.0 g/dL Final   01/19/2018 12.0 12.0 - 16.0 g/dL Final   01/17/2018 14.4 12.0 - 16.0 g/dL Final   04/25/2017 14.5 12.0 - 16.0 g/dL Final   09/14/2016 10.5 (L) 12.0 - 16.0 g/dL Final   09/10/2016 12.0 12.0 - 16.0 g/dL Final   09/09/2016 11.3 (L) 12.0 - 16.0 g/dL Final   07/25/2016 13.6 12.0 - 16.0 g/dL Final   02/23/2016 14.5 12.0 - 16.0 g/dL Final       Vit d 90 (2017)    ROS:  GENERAL:  Fatigue.  SKIN: No rashes, no itching.  HEAD: No headaches.  EYES: No visual changes  EARS:  Sometimes itching in the ears and right-sided decreased hearing  NOSE:  Does get occasional congestion for which she takes Claritin and gets better  MOUTH & THROAT: No hoarseness, change in voice, or sore throat.  NODES: Denies swollen glands.  CHEST: Denies VELASQUEZ, cyanosis, wheezing, cough and sputum production.  CARDIOVASCULAR: Denies chest pain, PND, orthopnea.  ABDOMEN: No nausea, vomiting, or changes in bowel function.  URINARY: No flank pain, dysuria or hematuria.  PERIPHERAL VASCULAR: No claudication or cyanosis.  MUSCULOSKELETAL:  Above  NEUROLOGIC: No  weakness or numbness.    Vital signs reviewed  PE:   APPEARANCE: Well nourished, well developed, in no acute distress.    HEAD: Normocephalic, atraumatic.  EYES: PERRL. EOMI.   Conjunctivae noninjected.  EARS: TM's intact. Light reflex normal. No retraction or perforation  NOSE: Mucosa pink. Airway clear.  MOUTH & THROAT: No tonsillar enlargement. No pharyngeal erythema or exudate.   NECK: Supple with no cervical lymphadenopathy.  No carotid bruits.  No thyromegaly  CHEST: Good inspiratory effort. Lungs clear to auscultation with no wheezes or crackles.  CARDIOVASCULAR: Normal S1, S2. No rubs, murmurs, or gallops.  ABDOMEN: Bowel sounds normal. Not distended. Soft. No tenderness or masses. No organomegaly.  EXTREMITIES: No edema, cyanosis, or clubbing.  MSK:  Left hip evaluation demonstrates some slight tenderness at the greater trochanter.  No IT band tenderness.  She does have a positive Santo's test.  She also has some groin pain with external rotation of the hip but not internal rotation of the hip.  Normal gait and station.  She does have some slight lower lumbar and paralumbar tenderness to palpation.  Negative straight leg raise.    IMPRESSION  1. Left hip pain    2. Hypertension, unspecified type    3. Osteoporosis, unspecified osteoporosis type, unspecified pathological fracture presence    4. Fatigue, unspecified type    5. Allergic rhinitis, unspecified seasonality, unspecified trigger            PLAN  Left hip pain, reviewed her prior hip x-ray images with her from last year on the left side.  Does have some slight acetabular sclerosis and some osteophytes noted. Will update hip x-ray to check for any progression of degenerative pathology.  Also she has an element of trochanteric bursitis and IT band syndrome.  Discussed IT band exercises and stretching.  Discussed hip conditioning.  Exercises have been provided to her.  Could considered round of physical therapy if no improvement.  Also discussed  potential for corticosteroid trochanteric bursa injection if symptoms do not improve although she is somewhat hesitant about this process    Fatigue:  Could be multifactorial.  Will update labs as below to assess for any progression of anemia, thyroid dysfunction, or progressive vitamin-D deficiency contributing.  She does have some allergic rhinitis and I am not sure the contribution of this to her fatigue.  Advise continued adequate hydration and oral intake, activity levels, sleep patterns    Orders Placed This Encounter   Procedures    X-Ray Hip 2 View Left    CBC auto differential    Comprehensive metabolic panel    Lipid panel    TSH    Vitamin D         SCREENINGS  Colonoscopy 2013, repeat in 10 years  Mammogram  4/17/18  GYN: Dr. Best     Immunizations  Pneumovax 2013  Shingles 2013  Adacel 2013  Prevnar 2016   Flu up-to-date  Bone density 06/13/2018.  L-spine T-score is-2.6.  The femoral neck T-score is -2.0

## 2019-01-18 NOTE — PATIENT INSTRUCTIONS
Iliotibial Band Syndrome Rehabilitation Exercises  You may do all of these exercises right away.   Iliotibial band stretch: Standing: Cross one leg in front of the other leg and bend down and touch your toes. You can move your hands across the floor toward the front leg and you will feel more stretch on the outside of your thigh on the other side. Hold this position for 15 to 30 seconds. Return to the starting position. Repeat 3 times. Reverse the positions of your legs and repeat.   Iliotibial band stretch: Side-leaning: Stand sideways near a wall. Place one hand on the wall for support. Cross the leg farthest from the wall over the other leg, keeping the foot closest to the wall flat on the floor. Lean your hips into the wall. Hold the stretch for 15 seconds, repeat 3 times, and then switch legs and repeat the exercise another 3 times.   Standing calf stretch: Facing a wall, put your hands against the wall at about eye level. Keep one leg back with the heel on the floor, and the other leg forward. Turn your back foot slightly inward (as if you were pigeon-toed) as you slowly lean into the wall until you feel a stretch in the back of your calf. Hold for 15 to 30 seconds. Repeat 3 times and then switch the position of your legs and repeat the exercise 3 times. Do this exercise several times each day.   Hamstring stretch on wall: Lie on your back with your buttocks close to a doorway, and extend your legs straight out in front of you along the floor. Raise one leg and rest it against the wall next to the door frame. Your other leg should extend through the doorway. You should feel a stretch in the back of your thigh. Hold this position for 15 to 30 seconds. Repeat 3 times and then switch legs and do the exercise again.   Quadriceps stretch: Stand an arm's length away from the wall with your injured leg farthest from the wall. Facing straight ahead, brace yourself by keeping one hand against the wall. With your other  hand, grasp the ankle of your injured leg and pull your heel toward your buttocks. Don't arch or twist your back. Keep your knees together. Hold this stretch for 15 to 30 seconds.   Wall squat with a ball: Stand with your back, shoulders, and head against a wall and look straight ahead. Keep your shoulders relaxed and your feet 2 feet away from the wall and a shoulder's width apart. Place a soccer or basketball-sized ball behind your back. Keeping your back upright, slowly squat down to a 45-degree angle. Your thighs will not yet be parallel to the floor. Hold this position for 10 seconds and then slowly slide back up the wall. Repeat 10 times. Build up to 3 sets of 10.   Side-lying leg lift: Lying on your uninjured side, tighten the front thigh muscles on your top leg and lift that leg 8 to 10 inches away from the other leg. Keep the leg straight and lower slowly. Do 3 sets of 10.   Knee stabilization: Wrap a piece of elastic tubing around the ankle of the uninjured leg. Tie a knot in the other end of the tubing and close it in a door.   Stand facing the door on the leg without tubing and bend your knee slightly, keeping your thigh muscles tight. While maintaining this position, move the leg with the tubing straight back behind you. Do 3 sets of 10.   Turn 90 degrees so the leg without tubing is closest to the door. Move the leg with tubing away from your body. Do 3 sets of 10.   Turn 90 degrees again so your back is to the door. Move the leg with tubing straight out in front of you. Do 3 sets of 10.   Turn your body 90 degrees again so the leg with tubing is closest to the door. Move the leg with tubing across your body. Do 3 sets of 10.   Hold onto a chair if you need help balancing. This exercise can be made even more challenging by standing on a pillow while you move the leg with tubing.   Iliotibial band stretch: Side-bending: Cross one leg in front of the other leg and lean in the opposite direction from the  front leg. Reach the arm on the side of the back leg over your head while you do this. Hold this position for 15 to 30 seconds. Return to the starting position. Repeat 3 times and then switch legs and repeat the exercise.   Clam exercise: Lie on your uninured side with your hips and knees bent and feet together. Slowly raise your top leg toward the ceiling while keeping your heels touching each other. Hold for 2 seconds and lower slowly. Do 3 sets of 10 repetitions.   Written by Jenna Melendez MS, PT, and Bren Bright PT, [facility], [facility] [place], for [facility].   Published by [facility].  © 2009 [facility] and/or its affiliates. All Rights Reserved.

## 2019-01-21 RX ORDER — AMLODIPINE BESYLATE 10 MG/1
10 TABLET ORAL DAILY
Qty: 30 TABLET | Refills: 8 | Status: SHIPPED | OUTPATIENT
Start: 2019-01-21 | End: 2019-11-14 | Stop reason: SDUPTHER

## 2019-01-22 ENCOUNTER — HOSPITAL ENCOUNTER (OUTPATIENT)
Dept: RADIOLOGY | Facility: HOSPITAL | Age: 74
Discharge: HOME | End: 2019-01-22
Attending: FAMILY MEDICINE
Payer: MEDICARE

## 2019-01-22 DIAGNOSIS — M25.552 LEFT HIP PAIN: ICD-10-CM

## 2019-01-22 PROCEDURE — 73502 X-RAY EXAM HIP UNI 2-3 VIEWS: CPT | Mod: TC,FY,LT

## 2019-01-22 PROCEDURE — 73502 X-RAY EXAM HIP UNI 2-3 VIEWS: CPT | Mod: 26,LT,, | Performed by: RADIOLOGY

## 2019-01-22 PROCEDURE — 73502 XR HIP 2 VIEW LEFT: ICD-10-PCS | Mod: 26,LT,, | Performed by: RADIOLOGY

## 2019-02-12 ENCOUNTER — TELEPHONE (OUTPATIENT)
Dept: FAMILY MEDICINE | Facility: CLINIC | Age: 74
End: 2019-02-12

## 2019-02-12 NOTE — TELEPHONE ENCOUNTER
----- Message from Thais Dash sent at 2/12/2019 10:08 AM CST -----  Contact: [phone]/self  Patient would like to get test results from 1/22/19. You may mail results to her home. Please advise.

## 2019-03-12 ENCOUNTER — OFFICE VISIT (OUTPATIENT)
Dept: OPTOMETRY | Facility: CLINIC | Age: 74
End: 2019-03-12
Payer: COMMERCIAL

## 2019-03-12 DIAGNOSIS — H40.013 OPEN ANGLE WITH BORDERLINE FINDINGS AND LOW GLAUCOMA RISK IN BOTH EYES: Primary | ICD-10-CM

## 2019-03-12 DIAGNOSIS — H52.4 HYPEROPIA OF BOTH EYES WITH ASTIGMATISM AND PRESBYOPIA: ICD-10-CM

## 2019-03-12 DIAGNOSIS — H25.13 NUCLEAR SCLEROSIS OF BOTH EYES: ICD-10-CM

## 2019-03-12 DIAGNOSIS — H52.203 HYPEROPIA OF BOTH EYES WITH ASTIGMATISM AND PRESBYOPIA: ICD-10-CM

## 2019-03-12 DIAGNOSIS — H52.03 HYPEROPIA OF BOTH EYES WITH ASTIGMATISM AND PRESBYOPIA: ICD-10-CM

## 2019-03-12 PROCEDURE — 92015 DETERMINE REFRACTIVE STATE: CPT | Mod: S$GLB,,, | Performed by: OPTOMETRIST

## 2019-03-12 PROCEDURE — 99999 PR PBB SHADOW E&M-EST. PATIENT-LVL II: ICD-10-PCS | Mod: PBBFAC,,, | Performed by: OPTOMETRIST

## 2019-03-12 PROCEDURE — 92015 PR REFRACTION: ICD-10-PCS | Mod: S$GLB,,, | Performed by: OPTOMETRIST

## 2019-03-12 PROCEDURE — 92014 COMPRE OPH EXAM EST PT 1/>: CPT | Mod: S$GLB,,, | Performed by: OPTOMETRIST

## 2019-03-12 PROCEDURE — 92014 PR EYE EXAM, EST PATIENT,COMPREHESV: ICD-10-PCS | Mod: S$GLB,,, | Performed by: OPTOMETRIST

## 2019-03-12 PROCEDURE — 99999 PR PBB SHADOW E&M-EST. PATIENT-LVL II: CPT | Mod: PBBFAC,,, | Performed by: OPTOMETRIST

## 2019-03-12 NOTE — PROGRESS NOTES
AMY MACIEL 04/2018  Glasses about 8 yrs. Old, would like updated RX for new   glasses. Patient has one pair of glasses for reading and one for distance.   Patient hasn't noticed any vision changes.  Eyes sometimes itch.  Patient   uses visine prn.    Last edited by Christal Lopez on 3/12/2019  8:40 AM. (History)            Assessment /Plan     For exam results, see Encounter Report.    Open angle with borderline findings and low glaucoma risk in both eyes  -     OCT - Optic Nerve; Future  -     Catalan Visual Field - OU - Intermediate - Both Eyes; Future    Nuclear sclerosis of both eyes    Hyperopia of both eyes with astigmatism and presbyopia      1. Slight increase in CD from last visit. Low risk based on CD, IOP low, no fam history, RTC for OCT, HVf(24-2)tamica std and pachy, gonio.  2. Educated pt on presence of cataracts and effects on vision. No surgery at this time. Recheck in one year.  3. Spec Rx given. Different lens options discussed with patient. RTC 1 year full exam.

## 2019-03-15 RX ORDER — HYDROCHLOROTHIAZIDE 25 MG/1
TABLET ORAL
Qty: 90 TABLET | Refills: 3 | Status: SHIPPED | OUTPATIENT
Start: 2019-03-15 | End: 2019-06-05

## 2019-03-20 RX ORDER — PRAVASTATIN SODIUM 10 MG/1
TABLET ORAL
Qty: 90 TABLET | Refills: 3 | Status: SHIPPED | OUTPATIENT
Start: 2019-03-20 | End: 2019-06-05

## 2019-04-03 ENCOUNTER — CLINICAL SUPPORT (OUTPATIENT)
Dept: OPHTHALMOLOGY | Facility: CLINIC | Age: 74
End: 2019-04-03
Payer: MEDICARE

## 2019-04-03 ENCOUNTER — OFFICE VISIT (OUTPATIENT)
Dept: OPTOMETRY | Facility: CLINIC | Age: 74
End: 2019-04-03
Payer: MEDICARE

## 2019-04-03 DIAGNOSIS — H40.013 OPEN ANGLE WITH BORDERLINE FINDINGS AND LOW GLAUCOMA RISK IN BOTH EYES: ICD-10-CM

## 2019-04-03 DIAGNOSIS — H40.013 OPEN ANGLE WITH BORDERLINE FINDINGS AND LOW GLAUCOMA RISK IN BOTH EYES: Primary | ICD-10-CM

## 2019-04-03 PROCEDURE — 92082 HUMPHREY VISUAL FIELD-OU-INTERMEDIATE-BOTH EYES: ICD-10-PCS | Mod: S$GLB,,, | Performed by: OPTOMETRIST

## 2019-04-03 PROCEDURE — 92020 PR SPECIAL EYE EVAL,GONIOSCOPY: ICD-10-PCS | Mod: S$GLB,,, | Performed by: OPTOMETRIST

## 2019-04-03 PROCEDURE — 92020 GONIOSCOPY: CPT | Mod: S$GLB,,, | Performed by: OPTOMETRIST

## 2019-04-03 PROCEDURE — 99999 PR PBB SHADOW E&M-EST. PATIENT-LVL II: CPT | Mod: PBBFAC,,, | Performed by: OPTOMETRIST

## 2019-04-03 PROCEDURE — 99999 PR PBB SHADOW E&M-EST. PATIENT-LVL II: ICD-10-PCS | Mod: PBBFAC,,, | Performed by: OPTOMETRIST

## 2019-04-03 PROCEDURE — 92133 CPTRZD OPH DX IMG PST SGM ON: CPT | Mod: S$GLB,,, | Performed by: OPTOMETRIST

## 2019-04-03 PROCEDURE — 92012 INTRM OPH EXAM EST PATIENT: CPT | Mod: S$GLB,,, | Performed by: OPTOMETRIST

## 2019-04-03 PROCEDURE — 76514 ECHO EXAM OF EYE THICKNESS: CPT | Mod: S$GLB,,, | Performed by: OPTOMETRIST

## 2019-04-03 PROCEDURE — 92133 POSTERIOR SEGMENT OCT OPTIC NERVE(OCULAR COHERENCE TOMOGRAPHY) - OU - BOTH EYES: ICD-10-PCS | Mod: S$GLB,,, | Performed by: OPTOMETRIST

## 2019-04-03 PROCEDURE — 76514 PR  US, EYE, FOR CORNEAL THICKNESS: ICD-10-PCS | Mod: S$GLB,,, | Performed by: OPTOMETRIST

## 2019-04-03 PROCEDURE — 92012 PR EYE EXAM, EST PATIENT,INTERMED: ICD-10-PCS | Mod: S$GLB,,, | Performed by: OPTOMETRIST

## 2019-04-03 PROCEDURE — 92082 INTERMEDIATE VISUAL FIELD XM: CPT | Mod: S$GLB,,, | Performed by: OPTOMETRIST

## 2019-04-03 NOTE — PROGRESS NOTES
AMY MACIEL 03/2019  Here for HVF,OCT, pachymetry and gonio today.  No changes   since last exam.  Not using any drops.  Will treat based on IOP and tests results    Last edited by Mil Mckeon, OD on 4/3/2019  9:36 AM. (History)            Assessment /Plan     For exam results, see Encounter Report.    Open angle with borderline findings and low glaucoma risk in both eyes      1. OCt normal OD, sup thinning os, HVf with sup nasal step defects ou, but do not correspond to OCT findings, first time test taker, IOP low normal, pachy normal, gonio open, RTC 3 mos recheck iop and repeat HVf(24-2)tamica std. No fam history of glaucoma.

## 2019-06-04 RX ORDER — ALENDRONATE SODIUM 70 MG/1
70 TABLET ORAL
Qty: 4 TABLET | Refills: 10 | Status: SHIPPED | OUTPATIENT
Start: 2019-06-04 | End: 2019-06-05

## 2019-06-04 NOTE — TELEPHONE ENCOUNTER
----- Message from Khalida Melendrez sent at 6/4/2019 10:36 AM CDT -----  Contact: [phone]  Patient would like a Rx for ear pain and blockage sent to [facility]/pharmacy #[id number] - [place], [place] - [address] AT [facility]. Please call.

## 2019-06-04 NOTE — TELEPHONE ENCOUNTER
Returned pt phone call too let her know that we would have to get her in for an appt for her. Got her scheduled tomorrow for 9:20AM.

## 2019-06-05 ENCOUNTER — OFFICE VISIT (OUTPATIENT)
Dept: FAMILY MEDICINE | Facility: CLINIC | Age: 74
End: 2019-06-05
Payer: MEDICARE

## 2019-06-05 VITALS
SYSTOLIC BLOOD PRESSURE: 142 MMHG | BODY MASS INDEX: 28.31 KG/M2 | OXYGEN SATURATION: 98 % | HEIGHT: 64 IN | HEART RATE: 64 BPM | TEMPERATURE: 98 F | DIASTOLIC BLOOD PRESSURE: 78 MMHG | WEIGHT: 165.81 LBS

## 2019-06-05 DIAGNOSIS — H65.03 BILATERAL ACUTE SEROUS OTITIS MEDIA, RECURRENCE NOT SPECIFIED: Primary | ICD-10-CM

## 2019-06-05 DIAGNOSIS — I10 HYPERTENSION, UNSPECIFIED TYPE: ICD-10-CM

## 2019-06-05 PROCEDURE — 3077F PR MOST RECENT SYSTOLIC BLOOD PRESSURE >= 140 MM HG: ICD-10-PCS | Mod: CPTII,S$GLB,, | Performed by: FAMILY MEDICINE

## 2019-06-05 PROCEDURE — 3078F DIAST BP <80 MM HG: CPT | Mod: CPTII,S$GLB,, | Performed by: FAMILY MEDICINE

## 2019-06-05 PROCEDURE — 99999 PR PBB SHADOW E&M-EST. PATIENT-LVL III: ICD-10-PCS | Mod: PBBFAC,,, | Performed by: FAMILY MEDICINE

## 2019-06-05 PROCEDURE — 3077F SYST BP >= 140 MM HG: CPT | Mod: CPTII,S$GLB,, | Performed by: FAMILY MEDICINE

## 2019-06-05 PROCEDURE — 99214 PR OFFICE/OUTPT VISIT, EST, LEVL IV, 30-39 MIN: ICD-10-PCS | Mod: S$GLB,,, | Performed by: FAMILY MEDICINE

## 2019-06-05 PROCEDURE — 1101F PR PT FALLS ASSESS DOC 0-1 FALLS W/OUT INJ PAST YR: ICD-10-PCS | Mod: CPTII,S$GLB,, | Performed by: FAMILY MEDICINE

## 2019-06-05 PROCEDURE — 1101F PT FALLS ASSESS-DOCD LE1/YR: CPT | Mod: CPTII,S$GLB,, | Performed by: FAMILY MEDICINE

## 2019-06-05 PROCEDURE — 3078F PR MOST RECENT DIASTOLIC BLOOD PRESSURE < 80 MM HG: ICD-10-PCS | Mod: CPTII,S$GLB,, | Performed by: FAMILY MEDICINE

## 2019-06-05 PROCEDURE — 99214 OFFICE O/P EST MOD 30 MIN: CPT | Mod: S$GLB,,, | Performed by: FAMILY MEDICINE

## 2019-06-05 PROCEDURE — 99999 PR PBB SHADOW E&M-EST. PATIENT-LVL III: CPT | Mod: PBBFAC,,, | Performed by: FAMILY MEDICINE

## 2019-06-05 RX ORDER — FLUTICASONE PROPIONATE 50 MCG
2 SPRAY, SUSPENSION (ML) NASAL DAILY
Qty: 1 BOTTLE | Refills: 5 | Status: SHIPPED | OUTPATIENT
Start: 2019-06-05 | End: 2019-07-30 | Stop reason: SDUPTHER

## 2019-06-05 NOTE — PROGRESS NOTES
(Portions of this note were dictated using voice recognition software and may contain dictation related errors in spelling/grammar/syntax not found on text review)    CC:   Chief Complaint   Patient presents with    Otalgia    Sore Throat       HPI: 73 y.o. female almost 2 wk ago was in [place] on vacation. Drank a tea at one of the temples, shortly after started with sore throat, now ears feel blocked and right ear pain. +congestion. Lots of cough initially  But getting better. No sob/cp/vomiting/fever.     Took otc Mucinex, no other meds.     Blood pressure elevated today.  Takes amlodipine 10 mg daily.  Typically blood pressure is controlled.  She is not sure if this just because she has not been exercising since she has been feeling poorly.    Past Medical History:   Diagnosis Date    Benign essential HTN 9/15/2016    Cataract     Closed fracture of right wrist 2018    2/2 fall Closed reduction and split done Tolerating well PT/OT on board    Closed nondisplaced fracture of anterior wall of right acetabulum 2018    Hyperlipidemia     Osteoarthritis     Osteoporosis     Ulcer of abdomen wall        Past Surgical History:   Procedure Laterality Date    ARTHROPLASTY-KNEE-BILATERAL Bilateral 2016    [facility]     SECTION      COLONOSCOPY N/A 2013    [facility]    KNEE ARTHROSCOPY      TOTAL KNEE ARTHROPLASTY Bilateral        Family History   Problem Relation Age of Onset    Coronary artery disease Brother 56    Diabetes Brother     Hypertension Brother     Diabetes Mother     No Known Problems Father     No Known Problems Sister     No Known Problems Maternal Aunt     No Known Problems Maternal Uncle     No Known Problems Paternal Aunt     No Known Problems Paternal Uncle     No Known Problems Maternal Grandmother     No Known Problems Maternal Grandfather     No Known Problems  Paternal Grandmother     No Known Problems Paternal Grandfather     Cancer Neg Hx     Amblyopia Neg Hx     Blindness Neg Hx     Cataracts Neg Hx     Glaucoma Neg Hx     Macular degeneration Neg Hx     Retinal detachment Neg Hx     Strabismus Neg Hx     Stroke Neg Hx     Thyroid disease Neg Hx        Social History     Socioeconomic History    Marital status:      Spouse name: Not on file    Number of children: Not on file    Years of education: Not on file    Highest education level: Not on file   Occupational History    Not on file   Social Needs    Financial resource strain: Not on file    Food insecurity:     Worry: Not on file     Inability: Not on file    Transportation needs:     Medical: Not on file     Non-medical: Not on file   Tobacco Use    Smoking status: Never Smoker    Smokeless tobacco: Never Used   Substance and Sexual Activity    Alcohol use: No    Drug use: No    Sexual activity: Yes   Lifestyle    Physical activity:     Days per week: Not on file     Minutes per session: Not on file    Stress: Not on file   Relationships    Social connections:     Talks on phone: Not on file     Gets together: Not on file     Attends [employer] service: Not on file     Active member of club or organization: Not on file     Attends meetings of clubs or organizations: Not on file     Relationship status: Not on file   Other Topics Concern    Not on file   Social History Narrative    Exercise: bike    Eating: rare sugar/sweets       ROS:  GENERAL:  Fatigue.  SKIN: No rashes, no itching.  HEAD: No headaches.  EYES: No visual changes  EARS:  Above  NOSE:  Above  MOUTH & THROAT:  Above  NODES: Denies swollen glands.  CHEST: Denies VELASQUEZ, cyanosis, wheezing, cough and sputum production.  CARDIOVASCULAR: Denies chest pain, PND, orthopnea.  ABDOMEN: No nausea, vomiting, or changes in bowel function.  URINARY: No flank pain, dysuria or hematuria.  PERIPHERAL VASCULAR: No claudication or  cyanosis.  MUSCULOSKELETAL: No joint stiffness or swelling. Denies back pain.  NEUROLOGIC: No weakness or numbness.    Vital signs reviewed  PE:   APPEARANCE: Well nourished, well developed, in no acute distress.    HEAD: Normocephalic, atraumatic.  No sinus tenderness  EYES: PERRL. EOMI.   Conjunctivae noninjected.  EARS:  Right TM is retracted.  There is a serous effusion behind the TM.  EAC otherwise normal.  Left TM is normal with a serous effusion noted behind the membrane.  EAC is normal.  NOSE: Mucosa pink, slightly pale.  Turbinates slightly edematous bilaterally  MOUTH & THROAT: No tonsillar enlargement.  Mild pharyngeal erythema with cobblestoning noted.  No exudate.  NECK: Supple with no cervical lymphadenopathy.    CHEST: Good inspiratory effort. Lungs clear to auscultation with no wheezes or crackles.  CARDIOVASCULAR: Normal S1, S2. No rubs, murmurs, or gallops.  ABDOMEN: Bowel sounds normal. Not distended. Soft. No tenderness or masses. No organomegaly.  EXTREMITIES: No edema      IMPRESSION  1. Bilateral acute serous otitis media, recurrence not specified    2. Hypertension, unspecified type            PLAN  Trial of Claritin D OTC for the next week to help with the congestion and improvement of eustachian tube dysfunction and thus her serous otitis media.  Add Flonase 2 sprays each nostril once daily for the next 2-3 weeks.  In the past would have some dry throat but she can try to rinse out her mouth regularly after administration of Flonase.    Hypertension:  Not controlled today but typically much better controlled at home.  Since using decongestant above temporarily, advise regular blood pressure monitoring at home.  Continue amlodipine 10 mg a day.  If significant elevation of blood pressure at home even with the decongestant, would advise stopping decongestant and calling the office.    Notify for any other concerns      SCREENINGS  Colonoscopy 2013, repeat in 10 years  Mammogram  4/17/18  GYN:  Dr. Best     Immunizations  Pneumovax 2013  Shingles 2013  Adacel 2013  Prevnar 2016  Flu up-to-date  Bone density 06/13/2018.  L-spine T-score is-2.6.  The femoral neck T-score is -2.0

## 2019-07-05 ENCOUNTER — TELEPHONE (OUTPATIENT)
Dept: OBSTETRICS AND GYNECOLOGY | Facility: CLINIC | Age: 74
End: 2019-07-05

## 2019-07-05 DIAGNOSIS — Z12.31 SCREENING MAMMOGRAM, ENCOUNTER FOR: Primary | ICD-10-CM

## 2019-07-05 NOTE — TELEPHONE ENCOUNTER
----- Message from Soha Arshad sent at 7/5/2019 10:18 AM CDT -----  No. [phone]   Patient needs a mammo order for [place].

## 2019-07-05 NOTE — TELEPHONE ENCOUNTER
Patient is scheduled for her annual exam on 7/25/19  She is requesting a mammogram order  Please advise

## 2019-07-16 RX ORDER — PRAVASTATIN SODIUM 10 MG/1
10 TABLET ORAL DAILY
Qty: 90 TABLET | Refills: 3 | Status: SHIPPED | OUTPATIENT
Start: 2019-07-16 | End: 2020-06-15 | Stop reason: SDUPTHER

## 2019-07-17 ENCOUNTER — HOSPITAL ENCOUNTER (OUTPATIENT)
Dept: RADIOLOGY | Facility: HOSPITAL | Age: 74
Discharge: HOME | End: 2019-07-17
Attending: OBSTETRICS & GYNECOLOGY
Payer: MEDICARE

## 2019-07-17 DIAGNOSIS — Z12.31 SCREENING MAMMOGRAM, ENCOUNTER FOR: ICD-10-CM

## 2019-07-17 PROCEDURE — 77067 MAMMO DIGITAL SCREENING BILAT WITH TOMOSYNTHESIS_CAD: ICD-10-PCS | Mod: 26,,, | Performed by: RADIOLOGY

## 2019-07-17 PROCEDURE — 77067 SCR MAMMO BI INCL CAD: CPT | Mod: TC

## 2019-07-17 PROCEDURE — 77063 MAMMO DIGITAL SCREENING BILAT WITH TOMOSYNTHESIS_CAD: ICD-10-PCS | Mod: 26,,, | Performed by: RADIOLOGY

## 2019-07-17 PROCEDURE — 77063 BREAST TOMOSYNTHESIS BI: CPT | Mod: 26,,, | Performed by: RADIOLOGY

## 2019-07-17 PROCEDURE — 77067 SCR MAMMO BI INCL CAD: CPT | Mod: 26,,, | Performed by: RADIOLOGY

## 2019-07-17 RX ORDER — ALENDRONATE SODIUM 70 MG/1
70 TABLET ORAL
Qty: 12 TABLET | Refills: 3 | Status: SHIPPED | OUTPATIENT
Start: 2019-07-17 | End: 2020-08-17 | Stop reason: SDUPTHER

## 2019-07-25 ENCOUNTER — OFFICE VISIT (OUTPATIENT)
Dept: OBSTETRICS AND GYNECOLOGY | Facility: CLINIC | Age: 74
End: 2019-07-25
Payer: MEDICARE

## 2019-07-25 VITALS
SYSTOLIC BLOOD PRESSURE: 132 MMHG | BODY MASS INDEX: 28.71 KG/M2 | DIASTOLIC BLOOD PRESSURE: 68 MMHG | WEIGHT: 168.19 LBS | HEIGHT: 64 IN

## 2019-07-25 DIAGNOSIS — Z12.31 SCREENING MAMMOGRAM, ENCOUNTER FOR: ICD-10-CM

## 2019-07-25 DIAGNOSIS — Z12.89 ENCOUNTER FOR PELVIC SCREENING FOR CANCER: Primary | ICD-10-CM

## 2019-07-25 DIAGNOSIS — R30.0 DYSURIA: ICD-10-CM

## 2019-07-25 PROCEDURE — G0101 PR CA SCREEN;PELVIC/BREAST EXAM: ICD-10-PCS | Mod: S$GLB,,, | Performed by: OBSTETRICS & GYNECOLOGY

## 2019-07-25 PROCEDURE — 99999 PR PBB SHADOW E&M-EST. PATIENT-LVL III: ICD-10-PCS | Mod: PBBFAC,,, | Performed by: OBSTETRICS & GYNECOLOGY

## 2019-07-25 PROCEDURE — 99999 PR PBB SHADOW E&M-EST. PATIENT-LVL III: CPT | Mod: PBBFAC,,, | Performed by: OBSTETRICS & GYNECOLOGY

## 2019-07-25 PROCEDURE — G0101 CA SCREEN;PELVIC/BREAST EXAM: HCPCS | Mod: S$GLB,,, | Performed by: OBSTETRICS & GYNECOLOGY

## 2019-07-25 NOTE — PROGRESS NOTES
"Chief Complaint   Patient presents with    Well Woman       HISTORY OF PRESENT ILLNESS:   Roberto Gonzalez is a 73 y.o. female  who presents for well woman exam.  No LMP recorded (lmp unknown). Patient is postmenopausal..  She is ,"menopausal at age 50 but reports she had PMB in  worked up by ob with D&C and was told it was normal and no issues since. She has has no complaints. declinesSTD testing. She c/o occasional burning when urinates but not all the time.     Past Medical History:   Diagnosis Date    Benign essential HTN 9/15/2016    Cataract     Closed fracture of right wrist 2018    2/2 fall Closed reduction and split done Tolerating well PT/OT on board    Closed nondisplaced fracture of anterior wall of right acetabulum 2018    Hyperlipidemia     Osteoarthritis     Osteoporosis     Ulcer of abdomen wall           Past Surgical History:   Procedure Laterality Date    ARTHROPLASTY-KNEE-BILATERAL Bilateral 2016    [facility] [place]     SECTION      COLONOSCOPY N/A 2013    [facility]    KNEE ARTHROSCOPY      TOTAL KNEE ARTHROPLASTY Bilateral          Social History     Socioeconomic History    Marital status:      Spouse name: Not on file    Number of children: Not on file    Years of education: Not on file    Highest education level: Not on file   Occupational History    Not on file   Social Needs    Financial resource strain: Not on file    Food insecurity:     Worry: Not on file     Inability: Not on file    Transportation needs:     Medical: Not on file     Non-medical: Not on file   Tobacco Use    Smoking status: Never Smoker    Smokeless tobacco: Never Used   Substance and Sexual Activity    Alcohol use: No    Drug use: No    Sexual activity: Yes   Lifestyle    Physical activity:     Days per week: Not on file     Minutes per session: Not on file    Stress: Not on file "   Relationships    Social connections:     Talks on phone: Not on file     Gets together: Not on file     Attends Syn[employer] service: Not on file     Active member of club or organization: Not on file     Attends meetings of clubs or organizations: Not on file     Relationship status: Not on file   Other Topics Concern    Not on file   Social History Narrative    Exercise: bike    Eating: rare sugar/sweets       Family History   Problem Relation Age of Onset    Coronary artery disease Brother 56    Diabetes Brother     Hypertension Brother     Diabetes Mother     No Known Problems Father     No Known Problems Sister     No Known Problems Maternal Aunt     No Known Problems Maternal Uncle     No Known Problems Paternal Aunt     No Known Problems Paternal Uncle     No Known Problems Maternal Grandmother     No Known Problems Maternal Grandfather     No Known Problems Paternal Grandmother     No Known Problems Paternal Grandfather     Cancer Neg Hx     Amblyopia Neg Hx     Blindness Neg Hx     Cataracts Neg Hx     Glaucoma Neg Hx     Macular degeneration Neg Hx     Retinal detachment Neg Hx     Strabismus Neg Hx     Stroke Neg Hx     Thyroid disease Neg Hx          OB History    Para Term  AB Living   3 3 0     3   SAB TAB Ectopic Multiple Live Births           3      # Outcome Date GA Lbr Darrell/2nd Weight Sex Delivery Anes PTL Lv   3 Para      Vag-Spont   SARAH   2 Para      Vag-Spont   SARAH   1 Para      CS-LTranv   SARAH       COMPREHENSIVE GYN HISTORY:  PAP History: Denies abnormal Paps  Infection History: Denies STDs. Denies PID  Benign History: Denies uterine fibroids. Denies ovarian cysts. Denies endometriosis Denies other conditions.  Cancer History: Denies cervical cancer. Denies uterine cancer or hyperplasia. Denies ovarian cancer. Denies vulvar cancer or pre-cancer. Denies vaginal cancer or pre-cancer. Denies breast cancer. Denies colon cancer.  Cycle: mon/-7d; menopause in  "1986 with episode of PMB in  worked up with D&C and no issues since    ROS:  GENERAL: Denies weight gain or weight loss. Feeling well overall.   SKIN: Denies rash or lesions.   HEAD: Denies headache.   NODES: Denies enlarged lymph nodes.   CHEST: Denies shortness of breath.   ABDOMEN: No abdominal pain, constipation, diarrhea, nausea, vomiting or rectal bleeding.   URINARY: No frequency, dysuria, hematuria, or burning on urination.  REPRODUCTIVE: See HPI.   BREASTS: The patient denies pain, lumps, or nipple discharge.       /68   Ht 5' 4" (1.626 m)   Wt 76.3 kg (168 lb 3.4 oz)   LMP  (LMP Unknown)   BMI 28.87 kg/m²     APPEARANCE: Well nourished, well developed, in no acute distress.  NECK: Neck symmetric without  thyromegaly.  NODES: No inguinal, cervical lymph node enlargement.  CHEST: Lungs clear to auscultation.  HEART: Regular rate and rhythm, no murmurs, rubs or gallops.  ABDOMEN: Soft. No tenderness or masses. No hernias. No hepatosplenomegaly. Large well healed vertical incision  BREASTS: Symmetrical, no skin changes or visible lesions. No palpable masses, nipple discharge or adenopathy bilaterally.  PELVIC:   VULVA: No lesions. Normal female genitalia.  URETHRAL MEATUS: Normal size and location, no lesions, no prolapse.  URETHRA: No masses, tenderness, prolapse or scarring.  VAGINA: atrophicd, no discharge, no significant cystocele or rectocele.  CERVIX: small and adhered to vaginal wall  UTERUS: Normal size, regular shape, mobile, non-tender, bladder base nontender.  ADNEXA: No masses or tenderness.  PERINEUM: Normal, no masses or lesions     Data Reviewed:   Last Pap: Date:  NILM  Last MM2016 BIRADs 1   Lipid profile:   Lab Results   Component Value Date    CHOL 215 (H) 2019    CHOL 227 (H) 2017    CHOL 208 (H) 2016     Lab Results   Component Value Date    HDL 57 2019    HDL 60 2017    HDL 54 2016     Lab Results   Component Value Date    LDLCALC " 133.6 01/22/2019    LDLCALC 142.8 04/25/2017    LDLCALC 137.6 07/25/2016     Lab Results   Component Value Date    TRIG 122 01/22/2019    TRIG 121 04/25/2017    TRIG 82 07/25/2016     Lab Results   Component Value Date    CHOLHDL 26.5 01/22/2019    CHOLHDL 26.4 04/25/2017    CHOLHDL 26.0 07/25/2016     Colonoscopy: 2013 negative, recommend repeat 10 years  DEXA: 2016 osteopenia     1. Encounter for pelvic screening for cancer        Plan: 1. Routine gyn annual exam. s/p normal breast exam and MMG done.   Pap with HPV cotesting not indicated, since over 65 and no history of abn pap.   Lipid Profile, needed every 5 years, up to date. Fasting glucose, needed every 3 years, up to date. TSH, needed every 5 years, up to date.  Colonoscopy up to date. DEXA up to date    2. PCP is treating osteopenia  3. Already urinated and couldn't give us a sample so will do UA through lab.     F/u in 1 yr or PRN

## 2019-07-30 ENCOUNTER — OFFICE VISIT (OUTPATIENT)
Dept: FAMILY MEDICINE | Facility: CLINIC | Age: 74
End: 2019-07-30
Payer: MEDICARE

## 2019-07-30 VITALS
OXYGEN SATURATION: 99 % | HEART RATE: 62 BPM | HEIGHT: 64 IN | TEMPERATURE: 98 F | DIASTOLIC BLOOD PRESSURE: 68 MMHG | SYSTOLIC BLOOD PRESSURE: 118 MMHG | WEIGHT: 165.81 LBS | BODY MASS INDEX: 28.31 KG/M2

## 2019-07-30 DIAGNOSIS — J30.9 ALLERGIC RHINITIS, UNSPECIFIED SEASONALITY, UNSPECIFIED TRIGGER: Primary | ICD-10-CM

## 2019-07-30 DIAGNOSIS — L30.9 DERMATITIS OF LEFT FOOT: ICD-10-CM

## 2019-07-30 DIAGNOSIS — E78.5 HYPERLIPIDEMIA, UNSPECIFIED HYPERLIPIDEMIA TYPE: ICD-10-CM

## 2019-07-30 DIAGNOSIS — I10 HYPERTENSION, UNSPECIFIED TYPE: ICD-10-CM

## 2019-07-30 DIAGNOSIS — R30.0 DYSURIA: ICD-10-CM

## 2019-07-30 DIAGNOSIS — M81.0 OSTEOPOROSIS, UNSPECIFIED OSTEOPOROSIS TYPE, UNSPECIFIED PATHOLOGICAL FRACTURE PRESENCE: ICD-10-CM

## 2019-07-30 DIAGNOSIS — R53.83 FATIGUE, UNSPECIFIED TYPE: ICD-10-CM

## 2019-07-30 PROCEDURE — 99999 PR PBB SHADOW E&M-EST. PATIENT-LVL III: ICD-10-PCS | Mod: PBBFAC,,, | Performed by: FAMILY MEDICINE

## 2019-07-30 PROCEDURE — 1101F PT FALLS ASSESS-DOCD LE1/YR: CPT | Mod: CPTII,S$GLB,, | Performed by: FAMILY MEDICINE

## 2019-07-30 PROCEDURE — 3078F PR MOST RECENT DIASTOLIC BLOOD PRESSURE < 80 MM HG: ICD-10-PCS | Mod: CPTII,S$GLB,, | Performed by: FAMILY MEDICINE

## 2019-07-30 PROCEDURE — 1101F PR PT FALLS ASSESS DOC 0-1 FALLS W/OUT INJ PAST YR: ICD-10-PCS | Mod: CPTII,S$GLB,, | Performed by: FAMILY MEDICINE

## 2019-07-30 PROCEDURE — 3078F DIAST BP <80 MM HG: CPT | Mod: CPTII,S$GLB,, | Performed by: FAMILY MEDICINE

## 2019-07-30 PROCEDURE — 99214 OFFICE O/P EST MOD 30 MIN: CPT | Mod: S$GLB,,, | Performed by: FAMILY MEDICINE

## 2019-07-30 PROCEDURE — 99214 PR OFFICE/OUTPT VISIT, EST, LEVL IV, 30-39 MIN: ICD-10-PCS | Mod: S$GLB,,, | Performed by: FAMILY MEDICINE

## 2019-07-30 PROCEDURE — 3074F PR MOST RECENT SYSTOLIC BLOOD PRESSURE < 130 MM HG: ICD-10-PCS | Mod: CPTII,S$GLB,, | Performed by: FAMILY MEDICINE

## 2019-07-30 PROCEDURE — 99999 PR PBB SHADOW E&M-EST. PATIENT-LVL III: CPT | Mod: PBBFAC,,, | Performed by: FAMILY MEDICINE

## 2019-07-30 PROCEDURE — 3074F SYST BP LT 130 MM HG: CPT | Mod: CPTII,S$GLB,, | Performed by: FAMILY MEDICINE

## 2019-07-30 RX ORDER — FLUTICASONE PROPIONATE 50 MCG
2 SPRAY, SUSPENSION (ML) NASAL DAILY
Qty: 1 BOTTLE | Refills: 5 | Status: SHIPPED | OUTPATIENT
Start: 2019-07-30 | End: 2020-09-23 | Stop reason: SDUPTHER

## 2019-07-30 RX ORDER — TRIAMCINOLONE ACETONIDE 1 MG/G
CREAM TOPICAL 2 TIMES DAILY
Qty: 30 G | Refills: 1 | Status: SHIPPED | OUTPATIENT
Start: 2019-07-30

## 2019-07-30 RX ORDER — MINERAL OIL
180 ENEMA (ML) RECTAL DAILY
Refills: 0 | COMMUNITY
Start: 2019-07-30 | End: 2020-07-29

## 2019-07-30 NOTE — PROGRESS NOTES
(Portions of this note were dictated using voice recognition software and may contain dictation related errors in spelling/grammar/syntax not found on text review)    CC:   Chief Complaint   Patient presents with    Follow-up     1 month; also c/o sinus problems.    Medication Refill       HPI: 73 y.o. female Saw me June of 2019 for congestion and coughing.  Blood pressure was elevated (takes amlodipine 10 mg daily), was not sure if this was just because of decreased physical activity since recent travel to [place] verses overall feeling badly the time of last exam.  She was advised on Claritin-D OTC plus Flonase but was advised to monitor blood pressure at home for any elevations on the decongestant.  She was kept on amlodipine 10 mg a day and advised to follow up for repeat blood pressure assessment.  Repeat blood pressure is controlled.    Concern about persistent sinus issues with congestion. No coughing but she does get rhinorrhea and postnasal drip.  She could not get the Flonase prescription from the pharmacy for some reason said she had been using her 's bottle, about 3 times a week.  Has been taking Claritin regularly but has not noticed much improvement.  Overall just feels fatigue and malaise.  Denies any shortness of breath or chest pain. She states the fatigue is been chronic but when she came back from [facility] about 2 months ago she feels like it got worse.  She did notice that when she was initiated not have any sinus issues but started up when she came back.  Not sure if the fatigue and the sinus issues are related.  Does walk for exercise, does feel tired during this but no cardiac respiratory complaints.  Denies any nausea, vomiting, diarrhea.      Does have a rash left ankle, had gotten some steroids for this in the past but is out.  Feels like the rash is coming back.  Uses moisturizing daily..  No recent contact concerns      Past Medical History:   Diagnosis Date    Benign essential HTN  9/15/2016    Cataract     Closed fracture of right wrist 2018    2/2 fall Closed reduction and split done Tolerating well PT/OT on board    Closed nondisplaced fracture of anterior wall of right acetabulum 2018    Hyperlipidemia     Osteoarthritis     Osteoporosis     Ulcer of abdomen wall        Past Surgical History:   Procedure Laterality Date    ARTHROPLASTY-KNEE-BILATERAL Bilateral 2016    [facility]     SECTION      COLONOSCOPY N/A 2013    [facility]    KNEE ARTHROSCOPY      TOTAL KNEE ARTHROPLASTY Bilateral        Family History   Problem Relation Age of Onset    Coronary artery disease Brother 56    Diabetes Brother     Hypertension Brother     Diabetes Mother     No Known Problems Father     No Known Problems Sister     No Known Problems Maternal Aunt     No Known Problems Maternal Uncle     No Known Problems Paternal Aunt     No Known Problems Paternal Uncle     No Known Problems Maternal Grandmother     No Known Problems Maternal Grandfather     No Known Problems Paternal Grandmother     No Known Problems Paternal Grandfather     Cancer Neg Hx     Amblyopia Neg Hx     Blindness Neg Hx     Cataracts Neg Hx     Glaucoma Neg Hx     Macular degeneration Neg Hx     Retinal detachment Neg Hx     Strabismus Neg Hx     Stroke Neg Hx     Thyroid disease Neg Hx        Social History     Tobacco Use    Smoking status: Never Smoker    Smokeless tobacco: Never Used   Substance Use Topics    Alcohol use: No    Drug use: No     Lab Results   Component Value Date    WBC 3.40 (L) 2019    HGB 13.4 2019    HCT 40.9 2019    MCV 93 2019     2019    CHOL 215 (H) 2019    TRIG 122 2019    HDL 57 2019    ALT 20 2019    AST 22 2019    BILITOT 0.6 2019    ALKPHOS 71 2019     (L) 2019    K 4.3  01/22/2019    CL 99 01/22/2019    CREATININE 0.9 01/22/2019    CALCIUM 9.7 01/22/2019    ALBUMIN 4.0 01/22/2019    BUN 12 01/22/2019    CO2 28 01/22/2019    TSH 3.326 01/22/2019    INR 1.0 01/17/2018    LDLCALC 133.6 01/22/2019    GLU 98 01/22/2019           ROS:  GENERAL:  Above.  SKIN:  Above.  HEAD: No headaches.  EYES: No visual changes  EARS: No ear pain or changes in hearing.  NOSE:  Above  MOUTH & THROAT: No hoarseness, change in voice, or sore throat.  NODES: Denies swollen glands.  CHEST:  Above  CARDIOVASCULAR: Denies chest pain, PND, orthopnea.  ABDOMEN: No nausea, vomiting, or changes in bowel function.  URINARY:  Occasional dysuria.  Her OBGYN had ordered urinalysis but has not been done yet.  PERIPHERAL VASCULAR: No claudication or cyanosis.  MUSCULOSKELETAL: No joint stiffness or swelling. Denies back pain.  NEUROLOGIC: No weakness or numbness.    Vital signs reviewed  PE:   APPEARANCE: Well nourished, well developed, in no acute distress.    HEAD: Normocephalic, atraumatic.  No sinus tenderness  EYES: PERRL. EOMI.   Conjunctivae noninjected.  EARS:  Serous middle ear effusion bilaterally  NOSE:  Nasal turbinate edema left greater than right  MOUTH & THROAT: No tonsillar enlargement. No pharyngeal erythema or exudate.   NECK: Supple with no cervical lymphadenopathy.    CHEST: Good inspiratory effort. Lungs clear to auscultation with no wheezes or crackles.  CARDIOVASCULAR: Normal S1, S2. No rubs, murmurs, or gallops.  ABDOMEN: Bowel sounds normal. Not distended. Soft. No tenderness or masses. No organomegaly.  EXTREMITIES: No edema.  Does have circumferential scaly hyperpigmented rash noted left ankle.      IMPRESSION  1. Allergic rhinitis, unspecified seasonality, unspecified trigger    2. Fatigue, unspecified type    3. Hypertension, unspecified type    4. Hyperlipidemia, unspecified hyperlipidemia type    5. Osteoporosis, unspecified osteoporosis type, unspecified pathological fracture presence     6. Dysuria    7. Dermatitis of left foot            PLAN  Switch over to Allegra daily  Add back Flonase 2 sprays each nostril once daily for the next 2-3 weeks.  Prescription has been again sent to the pharmacy  If no significant improvement with above over the next 2 weeks or so we could try short course of prednisone for 5 days in case of more recalcitrant allergic rhinosinusitis issues    Fatigue:  Chronic but worsened over the last couple months since being back from [place].  No other significant systemic symptoms.  Discussed workup with labs below but potential contribution of her allergic rhinitis issues to fatigue.    Was not able to get urine testing for dysuria when discussed with her OBGYN.  Can add urine testing to her upcoming labs    Refill triamcinolone cream for dermatitis of left ankle, continue daily moisturization.    Orders Placed This Encounter   Procedures    Urine culture    CBC auto differential    Comprehensive metabolic panel    Vitamin D    TSH    Lipid panel    Urinalysis                 SCREENINGS  Colonoscopy 2013, repeat in 10 years  Mammogram  07/17/2019:  Normal  GYN: Dr. Finch     Immunizations  Pneumovax 2013  Shingles 2013  Adacel 2013  Prevnar 2016  Flu up-to-date  Bone density 06/13/2018.  L-spine T-score is-2.6.  The femoral neck T-score is -2.0

## 2019-07-31 ENCOUNTER — LAB VISIT (OUTPATIENT)
Dept: LAB | Facility: HOSPITAL | Age: 74
End: 2019-07-31
Attending: FAMILY MEDICINE
Payer: MEDICARE

## 2019-07-31 DIAGNOSIS — R53.83 FATIGUE, UNSPECIFIED TYPE: ICD-10-CM

## 2019-07-31 DIAGNOSIS — M81.0 OSTEOPOROSIS, UNSPECIFIED OSTEOPOROSIS TYPE, UNSPECIFIED PATHOLOGICAL FRACTURE PRESENCE: ICD-10-CM

## 2019-07-31 DIAGNOSIS — I10 HYPERTENSION, UNSPECIFIED TYPE: ICD-10-CM

## 2019-07-31 DIAGNOSIS — E78.5 HYPERLIPIDEMIA, UNSPECIFIED HYPERLIPIDEMIA TYPE: ICD-10-CM

## 2019-07-31 LAB
25(OH)D3+25(OH)D2 SERPL-MCNC: 60 NG/ML (ref 30–96)
ALBUMIN SERPL BCP-MCNC: 4.1 G/DL (ref 3.5–5.2)
ALP SERPL-CCNC: 67 U/L (ref 55–135)
ALT SERPL W/O P-5'-P-CCNC: 17 U/L (ref 10–44)
ANION GAP SERPL CALC-SCNC: 7 MMOL/L (ref 8–16)
AST SERPL-CCNC: 22 U/L (ref 10–40)
BASOPHILS # BLD AUTO: 0.03 K/UL (ref 0–0.2)
BASOPHILS NFR BLD: 1 % (ref 0–1.9)
BILIRUB SERPL-MCNC: 0.5 MG/DL (ref 0.1–1)
BUN SERPL-MCNC: 9 MG/DL (ref 8–23)
CALCIUM SERPL-MCNC: 10 MG/DL (ref 8.7–10.5)
CHLORIDE SERPL-SCNC: 100 MMOL/L (ref 95–110)
CHOLEST SERPL-MCNC: 207 MG/DL (ref 120–199)
CHOLEST/HDLC SERPL: 3.8 {RATIO} (ref 2–5)
CO2 SERPL-SCNC: 28 MMOL/L (ref 23–29)
CREAT SERPL-MCNC: 1 MG/DL (ref 0.5–1.4)
DIFFERENTIAL METHOD: ABNORMAL
EOSINOPHIL # BLD AUTO: 0.1 K/UL (ref 0–0.5)
EOSINOPHIL NFR BLD: 3.1 % (ref 0–8)
ERYTHROCYTE [DISTWIDTH] IN BLOOD BY AUTOMATED COUNT: 12.3 % (ref 11.5–14.5)
EST. GFR  (AFRICAN AMERICAN): >60 ML/MIN/1.73 M^2
EST. GFR  (NON AFRICAN AMERICAN): 57 ML/MIN/1.73 M^2
GLUCOSE SERPL-MCNC: 100 MG/DL (ref 70–110)
HCT VFR BLD AUTO: 39.9 % (ref 37–48.5)
HDLC SERPL-MCNC: 55 MG/DL (ref 40–75)
HDLC SERPL: 26.6 % (ref 20–50)
HGB BLD-MCNC: 13.1 G/DL (ref 12–16)
LDLC SERPL CALC-MCNC: 131.2 MG/DL (ref 63–159)
LYMPHOCYTES # BLD AUTO: 1.4 K/UL (ref 1–4.8)
LYMPHOCYTES NFR BLD: 47.9 % (ref 18–48)
MCH RBC QN AUTO: 30.5 PG (ref 27–31)
MCHC RBC AUTO-ENTMCNC: 32.8 G/DL (ref 32–36)
MCV RBC AUTO: 93 FL (ref 82–98)
MONOCYTES # BLD AUTO: 0.3 K/UL (ref 0.3–1)
MONOCYTES NFR BLD: 10.1 % (ref 4–15)
NEUTROPHILS # BLD AUTO: 1.1 K/UL (ref 1.8–7.7)
NEUTROPHILS NFR BLD: 37.9 % (ref 38–73)
NONHDLC SERPL-MCNC: 152 MG/DL
PLATELET # BLD AUTO: 259 K/UL (ref 150–350)
PMV BLD AUTO: 10.3 FL (ref 9.2–12.9)
POTASSIUM SERPL-SCNC: 4.1 MMOL/L (ref 3.5–5.1)
PROT SERPL-MCNC: 7.3 G/DL (ref 6–8.4)
RBC # BLD AUTO: 4.3 M/UL (ref 4–5.4)
SODIUM SERPL-SCNC: 135 MMOL/L (ref 136–145)
TRIGL SERPL-MCNC: 104 MG/DL (ref 30–150)
TSH SERPL DL<=0.005 MIU/L-ACNC: 2.8 UIU/ML (ref 0.4–4)
WBC # BLD AUTO: 2.88 K/UL (ref 3.9–12.7)

## 2019-07-31 PROCEDURE — 84443 ASSAY THYROID STIM HORMONE: CPT

## 2019-07-31 PROCEDURE — 85025 COMPLETE CBC W/AUTO DIFF WBC: CPT

## 2019-07-31 PROCEDURE — 82306 VITAMIN D 25 HYDROXY: CPT

## 2019-07-31 PROCEDURE — 80053 COMPREHEN METABOLIC PANEL: CPT

## 2019-07-31 PROCEDURE — 80061 LIPID PANEL: CPT

## 2019-07-31 PROCEDURE — 36415 COLL VENOUS BLD VENIPUNCTURE: CPT

## 2019-08-05 ENCOUNTER — TELEPHONE (OUTPATIENT)
Dept: FAMILY MEDICINE | Facility: CLINIC | Age: 74
End: 2019-08-05

## 2019-08-05 DIAGNOSIS — R79.9 ABNORMAL BLOOD CHEMISTRY: Primary | ICD-10-CM

## 2019-08-05 DIAGNOSIS — D72.819 LEUKOPENIA, UNSPECIFIED TYPE: ICD-10-CM

## 2019-08-05 NOTE — TELEPHONE ENCOUNTER
Please check with patient:  Ordered urinalysis for patient as she did complain to a gyn about occasional burning on urination.  Her final urine culture did come back with some a slight amount of bacteria in the urine.  If she still having the symptoms, can send a prescription for Keflex antibiotic for 3 days.  If she is not having any symptoms of urination burning, this may not need to be specifically treated.    Also please inform her labs overall normal but kidney function is very mildly slow.  Also her white blood cell count was mildly low.  This is quite nonspecific, sometimes viral illnesses or infections or inflammation can cause abnormalities and white blood cell counts.  I would recommend following both these up in 1 month with repeat lab as below.

## 2019-08-06 ENCOUNTER — TELEPHONE (OUTPATIENT)
Dept: FAMILY MEDICINE | Facility: CLINIC | Age: 74
End: 2019-08-06

## 2019-08-06 RX ORDER — CEPHALEXIN 500 MG/1
500 CAPSULE ORAL 2 TIMES DAILY
Qty: 6 CAPSULE | Refills: 0 | Status: SHIPPED | OUTPATIENT
Start: 2019-08-06 | End: 2019-08-09

## 2019-08-06 NOTE — TELEPHONE ENCOUNTER
Okay, will send over Keflex twice a day for 3 days to see if this will help with her urinary symptoms.

## 2019-08-06 NOTE — TELEPHONE ENCOUNTER
Returned pt phone call, pt stated that she still has burning sometimes but not all the time and it isnt as bad. She also verbalized understanding of her results.

## 2019-08-12 ENCOUNTER — OFFICE VISIT (OUTPATIENT)
Dept: OPTOMETRY | Facility: CLINIC | Age: 74
End: 2019-08-12
Payer: MEDICARE

## 2019-08-12 ENCOUNTER — CLINICAL SUPPORT (OUTPATIENT)
Dept: OPHTHALMOLOGY | Facility: CLINIC | Age: 74
End: 2019-08-12
Payer: MEDICARE

## 2019-08-12 ENCOUNTER — TELEPHONE (OUTPATIENT)
Dept: FAMILY MEDICINE | Facility: CLINIC | Age: 74
End: 2019-08-12

## 2019-08-12 DIAGNOSIS — H40.013 OPEN ANGLE WITH BORDERLINE FINDINGS AND LOW GLAUCOMA RISK IN BOTH EYES: Primary | ICD-10-CM

## 2019-08-12 PROCEDURE — 92012 PR EYE EXAM, EST PATIENT,INTERMED: ICD-10-PCS | Mod: S$GLB,,, | Performed by: OPTOMETRIST

## 2019-08-12 PROCEDURE — 92012 INTRM OPH EXAM EST PATIENT: CPT | Mod: S$GLB,,, | Performed by: OPTOMETRIST

## 2019-08-12 PROCEDURE — 92133 POSTERIOR SEGMENT OCT OPTIC NERVE(OCULAR COHERENCE TOMOGRAPHY) - OU - BOTH EYES: ICD-10-PCS | Mod: S$GLB,,, | Performed by: OPTOMETRIST

## 2019-08-12 PROCEDURE — 92082 HUMPHREY VISUAL FIELD-OU-INTERMEDIATE-BOTH EYES: ICD-10-PCS | Mod: S$GLB,,, | Performed by: OPTOMETRIST

## 2019-08-12 PROCEDURE — 92133 CPTRZD OPH DX IMG PST SGM ON: CPT | Mod: S$GLB,,, | Performed by: OPTOMETRIST

## 2019-08-12 PROCEDURE — 92082 INTERMEDIATE VISUAL FIELD XM: CPT | Mod: S$GLB,,, | Performed by: OPTOMETRIST

## 2019-08-12 PROCEDURE — 99999 PR PBB SHADOW E&M-EST. PATIENT-LVL II: CPT | Mod: PBBFAC,,, | Performed by: OPTOMETRIST

## 2019-08-12 PROCEDURE — 99999 PR PBB SHADOW E&M-EST. PATIENT-LVL II: ICD-10-PCS | Mod: PBBFAC,,, | Performed by: OPTOMETRIST

## 2019-08-12 NOTE — TELEPHONE ENCOUNTER
----- Message from Shayna Ren sent at 8/12/2019 11:01 AM CDT -----  Contact: self, [phone]  Patient requests to know when she can  a copy of her lab report.

## 2019-08-12 NOTE — PROGRESS NOTES
AMY MACIEL 04/2019  Here for HVF,OCT and IOP check today.  Patient hasn't   noticed any vision changes.  Not using any drops.   Will treat if iop increased or tests abnormal    Last edited by Mil Mckeon, OD on 8/12/2019 12:01 PM. (History)            Assessment /Plan     For exam results, see Encounter Report.    Open angle with borderline findings and low glaucoma risk in both eyes  -     OCT - Optic Nerve  -     [place] Visual Field - OU - Intermediate - Both Eyes      1. IOP stable and fine for nerve, OCT stable and normal, VF with nasal defects OU, pachy normal, no fam history of glaucoma. RTC 4-6 mos for iop ck. No treat at this time.

## 2019-08-12 NOTE — TELEPHONE ENCOUNTER
Patient called and advised that a copy of her labs are at the  for .  Patient verbalized understanding.

## 2019-10-10 NOTE — PLAN OF CARE
----- Message from Salo Vera MD sent at 10/10/2019 12:22 PM CDT -----  Labs ok.  Keep working on low fat diet and exercise to keep cholesterol controlled.   Problem: Patient Care Overview  Goal: Plan of Care Review  Plan of care discussed with patient. She continues to c/o pain to her right hip and wrist. PRN Ultram given per MAR for pain relief. Pt able to urinate without mechanical intervention. Safety maintained. Pt helped to maneuver and change position in bed to avoid increased pain in her hip. IV fluids continued. Pt VSS and in NAD. Will continue to monitor for any changes.

## 2019-11-14 RX ORDER — AMLODIPINE BESYLATE 10 MG/1
10 TABLET ORAL DAILY
Qty: 90 TABLET | Refills: 2 | Status: SHIPPED | OUTPATIENT
Start: 2019-11-14 | End: 2020-09-16 | Stop reason: SDUPTHER

## 2020-01-22 ENCOUNTER — LAB VISIT (OUTPATIENT)
Dept: LAB | Facility: HOSPITAL | Age: 75
End: 2020-01-22
Attending: FAMILY MEDICINE
Payer: MEDICARE

## 2020-01-22 DIAGNOSIS — R79.9 ABNORMAL BLOOD CHEMISTRY: ICD-10-CM

## 2020-01-22 DIAGNOSIS — D72.819 LEUKOPENIA, UNSPECIFIED TYPE: ICD-10-CM

## 2020-01-22 LAB
ALBUMIN SERPL BCP-MCNC: 4 G/DL (ref 3.5–5.2)
ALP SERPL-CCNC: 75 U/L (ref 55–135)
ALT SERPL W/O P-5'-P-CCNC: 23 U/L (ref 10–44)
ANION GAP SERPL CALC-SCNC: 6 MMOL/L (ref 8–16)
AST SERPL-CCNC: 23 U/L (ref 10–40)
BASOPHILS # BLD AUTO: 0.02 K/UL (ref 0–0.2)
BASOPHILS NFR BLD: 0.4 % (ref 0–1.9)
BILIRUB SERPL-MCNC: 0.4 MG/DL (ref 0.1–1)
BUN SERPL-MCNC: 11 MG/DL (ref 8–23)
CALCIUM SERPL-MCNC: 9.9 MG/DL (ref 8.7–10.5)
CHLORIDE SERPL-SCNC: 101 MMOL/L (ref 95–110)
CO2 SERPL-SCNC: 30 MMOL/L (ref 23–29)
CREAT SERPL-MCNC: 0.9 MG/DL (ref 0.5–1.4)
DIFFERENTIAL METHOD: NORMAL
EOSINOPHIL # BLD AUTO: 0.1 K/UL (ref 0–0.5)
EOSINOPHIL NFR BLD: 1.7 % (ref 0–8)
ERYTHROCYTE [DISTWIDTH] IN BLOOD BY AUTOMATED COUNT: 12.4 % (ref 11.5–14.5)
EST. GFR  (AFRICAN AMERICAN): >60 ML/MIN/1.73 M^2
EST. GFR  (NON AFRICAN AMERICAN): >60 ML/MIN/1.73 M^2
GLUCOSE SERPL-MCNC: 100 MG/DL (ref 70–110)
HCT VFR BLD AUTO: 41.7 % (ref 37–48.5)
HGB BLD-MCNC: 13.6 G/DL (ref 12–16)
LYMPHOCYTES # BLD AUTO: 1.7 K/UL (ref 1–4.8)
LYMPHOCYTES NFR BLD: 34.3 % (ref 18–48)
MCH RBC QN AUTO: 30.3 PG (ref 27–31)
MCHC RBC AUTO-ENTMCNC: 32.6 G/DL (ref 32–36)
MCV RBC AUTO: 93 FL (ref 82–98)
MONOCYTES # BLD AUTO: 0.4 K/UL (ref 0.3–1)
MONOCYTES NFR BLD: 8.7 % (ref 4–15)
NEUTROPHILS # BLD AUTO: 2.7 K/UL (ref 1.8–7.7)
NEUTROPHILS NFR BLD: 54.9 % (ref 38–73)
PLATELET # BLD AUTO: 269 K/UL (ref 150–350)
PMV BLD AUTO: 10.7 FL (ref 9.2–12.9)
POTASSIUM SERPL-SCNC: 4.2 MMOL/L (ref 3.5–5.1)
PROT SERPL-MCNC: 7.7 G/DL (ref 6–8.4)
RBC # BLD AUTO: 4.49 M/UL (ref 4–5.4)
SODIUM SERPL-SCNC: 137 MMOL/L (ref 136–145)
WBC # BLD AUTO: 4.84 K/UL (ref 3.9–12.7)

## 2020-01-22 PROCEDURE — 36415 COLL VENOUS BLD VENIPUNCTURE: CPT | Mod: HCNC

## 2020-01-22 PROCEDURE — 85025 COMPLETE CBC W/AUTO DIFF WBC: CPT | Mod: HCNC

## 2020-01-22 PROCEDURE — 80053 COMPREHEN METABOLIC PANEL: CPT | Mod: HCNC

## 2020-01-29 ENCOUNTER — TELEPHONE (OUTPATIENT)
Dept: FAMILY MEDICINE | Facility: CLINIC | Age: 75
End: 2020-01-29

## 2020-01-29 NOTE — TELEPHONE ENCOUNTER
----- Message from Rosalba Butcher sent at 1/29/2020  9:57 AM CST -----  Contact: [phone]/self   Type:  Test Results    Who Called: Pt   Name of Test (Lab/Mammo/Etc): Lab work   Date of Test: 01-22-20  Ordering Provider: Dr. Butler   Where the test was performed: [facility]   Would the patient rather a call back or a response via [facility]? Call back   Best Call Back Number: [phone]  Additional Information:

## 2020-03-02 ENCOUNTER — TELEPHONE (OUTPATIENT)
Dept: FAMILY MEDICINE | Facility: CLINIC | Age: 75
End: 2020-03-02

## 2020-03-02 NOTE — TELEPHONE ENCOUNTER
----- Message from Angelica Gao sent at 3/2/2020 10:52 AM CST -----  Contact: [phone]/self   Type:  Same Day Appointment Request    Caller is requesting a same day appointment.  Caller declined first available appointment listed below.    Name of Caller:self  When is the first available appointment?03/10//2020  Symptoms:Toothaches and ear pain   Best Call Back Number:[phone]  Additional Information:N/A

## 2020-03-02 NOTE — TELEPHONE ENCOUNTER
Called pt let her know the next appt isnt until 3/13 pt said if it gets worse she will go to urgent care.

## 2020-09-23 RX ORDER — FLUTICASONE PROPIONATE 50 MCG
2 SPRAY, SUSPENSION (ML) NASAL DAILY
Qty: 16 G | Refills: 11 | Status: SHIPPED | OUTPATIENT
Start: 2020-09-23 | End: 2021-12-30 | Stop reason: SDUPTHER

## 2021-04-14 DIAGNOSIS — Z12.31 OTHER SCREENING MAMMOGRAM: ICD-10-CM

## 2021-05-25 ENCOUNTER — PATIENT OUTREACH (OUTPATIENT)
Dept: ADMINISTRATIVE | Facility: HOSPITAL | Age: 76
End: 2021-05-25

## 2025-05-15 NOTE — PLAN OF CARE
Gradually return to activity as tolerated over the next month  Home exercise program reviewed  Continue outpatient PT and transition to HEP  Anti-inflammatories or Tylenol prn pain  Ice and heat as needed  No work note needed  Ice and heat as needed forReturn if symptoms worsen or fail to improve.          Problem: Patient Care Overview  Goal: Plan of Care Review  Outcome: Ongoing (interventions implemented as appropriate)  Pt AAOX4. comaplints of pain with movement with  relief from norco. Pt without N/V/D.  Right arm with splint. Medication given per order. Pt OOb to chair with therapy. Pt assisted with turned very two hours . Safety maintained; bed alarm sounding. Will continue to monitor.